# Patient Record
Sex: FEMALE | Race: WHITE | NOT HISPANIC OR LATINO | Employment: OTHER | URBAN - METROPOLITAN AREA
[De-identification: names, ages, dates, MRNs, and addresses within clinical notes are randomized per-mention and may not be internally consistent; named-entity substitution may affect disease eponyms.]

---

## 2018-03-05 ENCOUNTER — OFFICE VISIT CONVERTED (OUTPATIENT)
Dept: ORTHOPEDIC SURGERY | Facility: CLINIC | Age: 68
End: 2018-03-05
Attending: ORTHOPAEDIC SURGERY

## 2018-05-31 ENCOUNTER — OFFICE VISIT CONVERTED (OUTPATIENT)
Dept: PODIATRY | Facility: CLINIC | Age: 68
End: 2018-05-31
Attending: PODIATRIST

## 2018-06-21 ENCOUNTER — OFFICE VISIT CONVERTED (OUTPATIENT)
Dept: PODIATRY | Facility: CLINIC | Age: 68
End: 2018-06-21
Attending: PODIATRIST

## 2018-07-12 ENCOUNTER — CONVERSION ENCOUNTER (OUTPATIENT)
Dept: PODIATRY | Facility: CLINIC | Age: 68
End: 2018-07-12

## 2018-07-12 ENCOUNTER — OFFICE VISIT CONVERTED (OUTPATIENT)
Dept: PODIATRY | Facility: CLINIC | Age: 68
End: 2018-07-12
Attending: PODIATRIST

## 2018-08-23 ENCOUNTER — CONVERSION ENCOUNTER (OUTPATIENT)
Dept: GENERAL RADIOLOGY | Facility: HOSPITAL | Age: 68
End: 2018-08-23

## 2019-08-30 ENCOUNTER — HOSPITAL ENCOUNTER (OUTPATIENT)
Dept: GENERAL RADIOLOGY | Facility: HOSPITAL | Age: 69
Discharge: HOME | End: 2019-08-30

## 2020-03-20 ENCOUNTER — HOSPITAL ENCOUNTER (OUTPATIENT)
Dept: URGENT CARE | Facility: CLINIC | Age: 70
Discharge: HOME | End: 2020-03-20

## 2020-03-20 ENCOUNTER — OFFICE VISIT CONVERTED (OUTPATIENT)
Dept: ORTHOPEDIC SURGERY | Facility: CLINIC | Age: 70
End: 2020-03-20
Attending: ORTHOPAEDIC SURGERY

## 2020-03-23 ENCOUNTER — HOSPITAL ENCOUNTER (OUTPATIENT)
Dept: PERIOP | Facility: HOSPITAL | Age: 70
Setting detail: HOSPITAL OUTPATIENT SURGERY
Discharge: HOME | End: 2020-03-23
Attending: ORTHOPAEDIC SURGERY

## 2020-04-07 ENCOUNTER — OFFICE VISIT CONVERTED (OUTPATIENT)
Dept: ORTHOPEDIC SURGERY | Facility: CLINIC | Age: 70
End: 2020-04-07
Attending: ORTHOPAEDIC SURGERY

## 2020-05-07 ENCOUNTER — OFFICE VISIT CONVERTED (OUTPATIENT)
Dept: ORTHOPEDIC SURGERY | Facility: CLINIC | Age: 70
End: 2020-05-07
Attending: ORTHOPAEDIC SURGERY

## 2020-06-09 ENCOUNTER — CONVERSION ENCOUNTER (OUTPATIENT)
Dept: ORTHOPEDIC SURGERY | Facility: CLINIC | Age: 70
End: 2020-06-09

## 2020-06-09 ENCOUNTER — OFFICE VISIT CONVERTED (OUTPATIENT)
Dept: ORTHOPEDIC SURGERY | Facility: CLINIC | Age: 70
End: 2020-06-09
Attending: ORTHOPAEDIC SURGERY

## 2020-07-21 ENCOUNTER — OFFICE VISIT CONVERTED (OUTPATIENT)
Dept: ORTHOPEDIC SURGERY | Facility: CLINIC | Age: 70
End: 2020-07-21
Attending: ORTHOPAEDIC SURGERY

## 2020-08-15 ENCOUNTER — HOSPITAL ENCOUNTER (OUTPATIENT)
Dept: URGENT CARE | Facility: CLINIC | Age: 70
Discharge: HOME | End: 2020-08-15
Attending: EMERGENCY MEDICINE

## 2020-08-17 ENCOUNTER — OFFICE VISIT CONVERTED (OUTPATIENT)
Dept: FAMILY MEDICINE CLINIC | Facility: CLINIC | Age: 70
End: 2020-08-17
Attending: FAMILY MEDICINE

## 2020-08-28 ENCOUNTER — OFFICE VISIT CONVERTED (OUTPATIENT)
Dept: FAMILY MEDICINE CLINIC | Facility: CLINIC | Age: 70
End: 2020-08-28
Attending: FAMILY MEDICINE

## 2020-08-28 ENCOUNTER — HOSPITAL ENCOUNTER (OUTPATIENT)
Dept: GENERAL RADIOLOGY | Facility: HOSPITAL | Age: 70
Discharge: HOME | End: 2020-08-28
Attending: FAMILY MEDICINE

## 2020-09-11 ENCOUNTER — OFFICE VISIT CONVERTED (OUTPATIENT)
Dept: SURGERY | Facility: CLINIC | Age: 70
End: 2020-09-11
Attending: NURSE PRACTITIONER

## 2020-09-17 ENCOUNTER — OFFICE VISIT CONVERTED (OUTPATIENT)
Dept: FAMILY MEDICINE CLINIC | Facility: CLINIC | Age: 70
End: 2020-09-17
Attending: FAMILY MEDICINE

## 2020-10-01 ENCOUNTER — HOSPITAL ENCOUNTER (OUTPATIENT)
Dept: MRI IMAGING | Facility: HOSPITAL | Age: 70
Discharge: HOME | End: 2020-10-01
Attending: FAMILY MEDICINE

## 2020-10-02 ENCOUNTER — OFFICE VISIT CONVERTED (OUTPATIENT)
Dept: ORTHOPEDIC SURGERY | Facility: CLINIC | Age: 70
End: 2020-10-02
Attending: ORTHOPAEDIC SURGERY

## 2020-10-15 ENCOUNTER — HOSPITAL ENCOUNTER (OUTPATIENT)
Dept: GENERAL RADIOLOGY | Facility: HOSPITAL | Age: 70
Discharge: HOME | End: 2020-10-15
Attending: FAMILY MEDICINE

## 2020-10-15 ENCOUNTER — OFFICE VISIT CONVERTED (OUTPATIENT)
Dept: FAMILY MEDICINE CLINIC | Facility: CLINIC | Age: 70
End: 2020-10-15
Attending: FAMILY MEDICINE

## 2020-10-15 LAB
ANION GAP SERPL CALC-SCNC: 14 MMOL/L (ref 8–19)
BUN SERPL-MCNC: 15 MG/DL (ref 5–25)
BUN/CREAT SERPL: 19 {RATIO} (ref 6–20)
CALCIUM SERPL-MCNC: 9.2 MG/DL (ref 8.7–10.4)
CHLORIDE SERPL-SCNC: 103 MMOL/L (ref 99–111)
CONV CO2: 27 MMOL/L (ref 22–32)
CREAT UR-MCNC: 0.77 MG/DL (ref 0.5–0.9)
GFR SERPLBLD BASED ON 1.73 SQ M-ARVRAT: >60 ML/MIN/{1.73_M2}
GLUCOSE SERPL-MCNC: 83 MG/DL (ref 65–99)
OSMOLALITY SERPL CALC.SUM OF ELEC: 290 MOSM/KG (ref 273–304)
POTASSIUM SERPL-SCNC: 4.3 MMOL/L (ref 3.5–5.3)
SODIUM SERPL-SCNC: 140 MMOL/L (ref 135–147)

## 2020-11-05 ENCOUNTER — OFFICE VISIT CONVERTED (OUTPATIENT)
Dept: ORTHOPEDIC SURGERY | Facility: CLINIC | Age: 70
End: 2020-11-05
Attending: ORTHOPAEDIC SURGERY

## 2020-11-25 ENCOUNTER — HOSPITAL ENCOUNTER (OUTPATIENT)
Dept: PREADMISSION TESTING | Facility: HOSPITAL | Age: 70
Discharge: HOME | End: 2020-11-25
Attending: SURGERY

## 2020-11-27 LAB — SARS-COV-2 RNA SPEC QL NAA+PROBE: NOT DETECTED

## 2020-11-30 ENCOUNTER — HOSPITAL ENCOUNTER (OUTPATIENT)
Dept: GASTROENTEROLOGY | Facility: HOSPITAL | Age: 70
Setting detail: HOSPITAL OUTPATIENT SURGERY
Discharge: HOME | End: 2020-11-30
Attending: SURGERY

## 2020-12-17 ENCOUNTER — OFFICE VISIT CONVERTED (OUTPATIENT)
Dept: ORTHOPEDIC SURGERY | Facility: CLINIC | Age: 70
End: 2020-12-17
Attending: ORTHOPAEDIC SURGERY

## 2020-12-17 ENCOUNTER — CONVERSION ENCOUNTER (OUTPATIENT)
Dept: ORTHOPEDIC SURGERY | Facility: CLINIC | Age: 70
End: 2020-12-17

## 2021-01-05 ENCOUNTER — HOSPITAL ENCOUNTER (OUTPATIENT)
Dept: MRI IMAGING | Facility: HOSPITAL | Age: 71
Discharge: HOME | End: 2021-01-05
Attending: ORTHOPAEDIC SURGERY

## 2021-01-08 ENCOUNTER — OFFICE VISIT CONVERTED (OUTPATIENT)
Dept: ORTHOPEDIC SURGERY | Facility: CLINIC | Age: 71
End: 2021-01-08
Attending: ORTHOPAEDIC SURGERY

## 2021-01-15 ENCOUNTER — OFFICE VISIT CONVERTED (OUTPATIENT)
Dept: FAMILY MEDICINE CLINIC | Facility: CLINIC | Age: 71
End: 2021-01-15
Attending: FAMILY MEDICINE

## 2021-03-15 ENCOUNTER — HOSPITAL ENCOUNTER (OUTPATIENT)
Dept: VACCINE CLINIC | Facility: HOSPITAL | Age: 71
Discharge: HOME | End: 2021-03-15
Attending: INTERNAL MEDICINE

## 2021-04-05 ENCOUNTER — HOSPITAL ENCOUNTER (OUTPATIENT)
Dept: GENERAL RADIOLOGY | Facility: HOSPITAL | Age: 71
Discharge: HOME | End: 2021-04-05
Attending: FAMILY MEDICINE

## 2021-04-05 ENCOUNTER — HOSPITAL ENCOUNTER (OUTPATIENT)
Dept: VACCINE CLINIC | Facility: HOSPITAL | Age: 71
Discharge: HOME | End: 2021-04-05
Attending: INTERNAL MEDICINE

## 2021-04-29 ENCOUNTER — HOSPITAL ENCOUNTER (OUTPATIENT)
Dept: GENERAL RADIOLOGY | Facility: HOSPITAL | Age: 71
Discharge: HOME | End: 2021-04-29
Attending: FAMILY MEDICINE

## 2021-04-29 LAB
T4 FREE SERPL-MCNC: 1.7 NG/DL (ref 0.9–1.8)
TSH SERPL-ACNC: 0.86 M[IU]/L (ref 0.27–4.2)

## 2021-05-03 ENCOUNTER — OFFICE VISIT CONVERTED (OUTPATIENT)
Dept: FAMILY MEDICINE CLINIC | Facility: CLINIC | Age: 71
End: 2021-05-03
Attending: FAMILY MEDICINE
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
## 2021-05-10 NOTE — H&P
History and Physical      Patient Name: Marisa Uribe   Patient ID: [id number]   Sex: Female   YOB: 1950    Primary Care Provider: Garrett Sharp MD   Referring Provider: Kristian Byrne DPM    Visit Date: August 17, 2020    Provider: Amy Goel DO   Location: [facility]   Location Address: [address] [place] [address] [address]  [place], [place]  [id number]   Location Phone: [phone]          Chief Complaint  · Pt here to establish care and discuss acid reflux and bone density.      History Of Present Illness  Marisa Uribe is a 69 year old /White female who presents for evaluation and treatment of:      She is here today to establish relations as a new patient. She is here from [place] and moved her in 2016. Her presvious PCP was Jenni Paul.  She has a past medical history significant for hypertension, GERD and hypothyroidism.  She is a previous smoker and smoked for 16 years.  She smoked approximate 2 packs a day.  She occasionally uses alcohol.  She has a family history of coronary artery disease and lung cancer.    Colonoscopy in 2014 and had polyps.  She was due for repeat colonoscopy last year.  Mammogram last year and was normal.     Labs 3/3/2020: Cholesterol 199, triglycerides 72, HDL 75, .    DEXA scan: Femoral neck  - 1.7- (-) 1.9 AP spine -1.2.    Her home blood pressures are running 143/80 to 130/80.  She is in pain today.     She is being managed by Dr Martinez for her right leg fracture.     She has no other complaints today denies chest pain, shortness of breath, weakness, numbness, nausea, vomiting, diarrhea, dizziness or syncopal event.       Past Medical History  Disease Name Date Onset Notes   Allergies --  --    Arthritis --  --    Bunion --  --    Foot pain, left 07/12/2018 --    Hallux valgus (acquired), left foot 07/12/2018 --    Head injury --  --    Hemorrhoid --  --    Hypertension --  --    Metatarsal fracture 07/12/2018 --     Reflux --  --    Screening breast examination 2019 --    Thyroid disorder --  --          Past Surgical History  Procedure Name Date Notes   Bunion surgery --  --    Colonoscopy 2/25/14 --    Fibula Fracture --  --    Thyroid surgery --  --          Medication List  Name Date Started Instructions   losartan-hydrochlorothiazide 100-25 mg oral tablet  take 1 tablet by oral route once daily   Synthroid 125 mcg oral tablet  take 1 tablet (125 mcg) by oral route once daily         Allergy List  Allergen Name Date Reaction Notes   NO KNOWN DRUG ALLERGIES --  --  --        Allergies Reconciled  Family Medical History  Disease Name Relative/Age Notes   Heart Disease Father/   Father   Cancer, Unspecified Mother/   Mother   Diabetes, unspecified type Father/   Father         Social History  Finding Status Start/Stop Quantity Notes   Alcohol Use --  --/-- --  rarely drinks   lives with spouse --  --/-- --  --    . --  --/-- --  --    Recreational Drug Use Never --/-- --  no   Retired. --  --/-- --  --    Tobacco Former --/-- --  --          Review of Systems  · Constitutional  o Denies  o : fatigue, night sweats  · Eyes  o Denies  o : double vision, blurred vision  · HENT  o Denies  o : vertigo, recent head injury  · Breasts  o Denies  o : abnormal changes in breast size, additional breast symptoms except as noted in the HPI  · Cardiovascular  o Denies  o : chest pain, irregular heart beats  · Respiratory  o Denies  o : shortness of breath, productive cough  · Gastrointestinal  o Denies  o : nausea, vomiting  · Genitourinary  o Denies  o : dysuria, urinary retention  · Integument  o Denies  o : hair growth change, new skin lesions  · Neurologic  o Denies  o : altered mental status, seizures  · Musculoskeletal  o Admits  o : right forearm pain  · Endocrine  o Denies  o : cold intolerance, heat intolerance  · Heme-Lymph  o Denies  o : petechiae, lymph node enlargement or  "tenderness  · Allergic-Immunologic  o Denies  o : frequent illnesses      Vitals  Date Time BP Position Site L\R Cuff Size HR RR TEMP (F) WT  HT  BMI kg/m2 BSA m2 O2 Sat HC       08/17/2020 08:54 /83 Sitting    64 - R 14 96.6 168lbs 8oz 5'  3\" 29.85 1.84 97 %    08/17/2020 08:58 /79 Sitting    66 - R                 Physical Examination  · Constitutional  o Appearance  o : well-nourished, well developed, alert, NAD  · Head and Face  o Head  o :   § Inspection  § : atraumatic, normocephalic  · Eyes  o Eyes  o : extraocular movements intact, no scleral icterus, no conjunctival injection  · Ears, Nose, Mouth and Throat  o Ears  o :   § External Ears  § : normal  o Nose  o :   § Intranasal Exam  § : nares patent  o Oral Cavity  o :   § Oral Mucosa  § : moist mucous membranes  · Respiratory  o Respiratory Effort  o : breathing comfortably, symmetric chest rise  o Auscultation of Lungs  o : clear to asculatation bilaterally, no wheezes, rales, or rhonchii  · Cardiovascular  o Heart  o :   § Auscultation of Heart  § : regular rate and rhythm, no murmurs, rubs, or gallops  o Peripheral Vascular System  o :   § Extremities  § : no edema  · Skin and Subcutaneous Tissue  o General Inspection  o : no rashes present, no lesions present, no areas of discoloration, skin turgor normal  · Neurologic  o Mental Status Examination  o :   § Orientation  § : grossly oriented to person, place and time  o Cranial Nerves  o : crainial nerves 2-12 grossly intact  o Gait and Station  o :   § Gait Screening  § : normal gait  · Psychiatric  o General  o : normal mood and affect              Assessment  · Essential hypertension     401.9/I10  · Hypothyroidism     244.9/E03.9  · Vitamin D deficiency     268.9/E55.9  · Screening for depression     V79.0/Z13.89  · Screening for colon cancer     V76.51/Z12.11    Problems Reconciled  Plan  · Orders  o ACO-18: Negative screen for clinical depression using a standardized tool () - " V79.0/Z13.89 - 08/17/2020  o COLONOSCOPY REFERRAL (COLON) - V76.51/Z12.11 - 08/17/2020  o CBC with Auto Diff [facility] (15727) - 401.9/I10 - 08/17/2020  o CMP H (76744) - 401.9/I10 - 08/17/2020  o Thyroid Profile (17771, 33790, THYII) - 244.9/E03.9 - 08/17/2020  o Vitamin D (25-Hydroxy) Level (97570) - 268.9/E55.9 - 08/17/2020  o ACO-39: Current medications updated and reviewed () - - 08/17/2020  · Medications  o Medications have been Reconciled  o Transition of Care or Provider Policy  · Instructions  o Depression Screen completed and scanned into the EMR under the designated folder within the patient's documents.  o Today's PHQ-9 result is _0__  o Patient was educated/instructed on their diagnosis, treatment and medications prior to discharge from the clinic today.  · Disposition  o Follow Up in 3 months     1.  Hypertension: Her blood pressure is elevated today's visit but she is in pain.  She was told to continue taking her blood pressure at home and bring a log and her cuff back for next appointment.  2.  Hypothyroidism: She will be continued on her current dosage of Synthroid.  The patient was given an order today for a thyroid profile to be managed according to findings.  3.  Vitamin D deficiency: She was encouraged to continue her current over-the-counter vitamin D replacement.  A lab order was placed for a vitamin D level today to be managed according to findings.  4.  Screening for colon cancer: She was given order today for screening colonoscopy.             Electronically Signed by: Amy Goel DO -Author on August 17, 2020 01:43:54 PM

## 2021-05-10 NOTE — H&P
History and Physical      Patient Name: Marisa Uribe   Patient ID: [id number]   Sex: Female   YOB: 1950    Primary Care Provider: Amy Goel DO   Referring Provider: Amy Goel DO    Visit Date: September 11, 2020    Provider: NITO Key   Location: [facility]   Location Address: [address] [place] [address]  [id number]   Location Phone: [phone]          Chief Complaint  · Requesting colonoscopy  · Age 50 or over  · Here today for a pre-surgical colon screening visit  · Personal History of Polyps      History Of Present Illness  The patient is a 69 year old /White female presenting to the Surgical Specialist office on a referral from Amy Goel DO.   Marisa Uribe needs to have a screening colonoscopy.   Patient states that they have had a colonoscopy. 6 years ago   Patient currently complains of: no complaints and   Patient Does not have family history of colon cancer.      Patient presents today on referral from Dr. Amy Goel for screening colonoscopy.  Patient denies any abdominal pain, diarrhea, or rectal bleeding.  Patient denies any family history of colorectal cancer.  Admits to history of colonic polyps.    2/14: Colonoscopy ([facility]): Diverticulosis; External hemorrhoids.     11/10: Colonoscopy ([facility]): 2-Adenomas (location not identified)       Past Medical History  Disease Name Date Onset Notes   Allergies --  --    Arthritis --  --    Bunion --  --    Foot pain, left 07/12/2018 --    GERD --  --    Hallux valgus (acquired), left foot 07/12/2018 --    Head injury --  --    Hemorrhoid --  --    Hypertension --  --    Hypothyroidism --  --    Metatarsal fracture 07/12/2018 --    Rectal bleeding --  --    Reflux --  --    Screening breast examination 2019 --    Thyroid disorder --  --          Past Surgical History  Procedure Name Date Notes   Bunion surgery --  --    Colonoscopy 2/25/14 --    Fibula Fracture --  --    Thyroid surgery  "--  --          Medication List  Name Date Started Instructions   losartan-hydrochlorothiazide 100-25 mg oral tablet  take 1 tablet by oral route once daily   Synthroid 125 mcg oral tablet  take 1 tablet (125 mcg) by oral route once daily         Allergy List  Allergen Name Date Reaction Notes   NO KNOWN DRUG ALLERGIES --  --  --        Allergies Reconciled  Family Medical History  Disease Name Relative/Age Notes   Heart Disease Father/   Father   Cancer, Unspecified Mother/   Mother   Diabetes, unspecified type Father/   Father         Social History  Finding Status Start/Stop Quantity Notes   Alcohol Use --  --/-- --  09/11/2020 - rarely drinks   lives with spouse --  --/-- --  --    . --  --/-- --  --    Recreational Drug Use Never --/-- --  no   Retired. --  --/-- --  --    Tobacco Former --/-- --  --          Review of Systems  · Constitutional  o Denies  o : fever, chills  · Eyes  o Denies  o : yellowish discoloration of eyes  · HENT  o Denies  o : difficulty swallowing  · Cardiovascular  o Denies  o : chest pain, chest pain on exertion  · Respiratory  o Denies  o : shortness of breath  · Gastrointestinal  o Denies  o : nausea, vomiting, diarrhea, constipation  · Genitourinary  o Denies  o : abnormal color of urine  · Integument  o Denies  o : rash  · Neurologic  o Denies  o : tingling or numbness  · Musculoskeletal  o Denies  o : joint pain  · Endocrine  o Denies  o : weight gain, weight loss      Vitals  Date Time BP Position Site L\R Cuff Size HR RR TEMP (F) WT  HT  BMI kg/m2 BSA m2 O2 Sat        09/11/2020 09:55 AM         162lbs 0oz 5'  3\" 28.7 1.81           Physical Examination  · Constitutional  o Appearance  o : well developed, well-nourished, patient in no apparent distress  · Head and Face  o Head  o :   § Inspection  § : atraumatic, normocephalic  o Face  o :   § Inspection  § : no facial lesions  · Eyes  o Conjunctivae  o : conjunctivae normal  o Sclerae  o : sclerae " white  · Neck  o Inspection/Palpation  o : normal appearance, no masses or tenderness, trachea midline  · Respiratory  o Respiratory Effort  o : breathing unlabored  · Skin and Subcutaneous Tissue  o General Inspection  o : no lesions present, no areas of discoloration, skin turgor normal, texture normal  · Neurologic  o Mental Status Examination  o :   § Orientation  § : grossly oriented to person, place and time  § Attention  § : attention normal, concentration abilities normal  § Fund of Knowledge  § : fund of knowledge within normal limits, patient aware of current events  o Gait and Station  o : normal gait, able to stand without difficulty  · Psychiatric  o Judgement and Insight  o : judgment and insight intact  o Mood and Affect  o : mood normal, affect appropriate          Assessment  · Personal history of colonic polyps     V12.72/Z86.010  · Screening for colon cancer     V76.51/Z12.11  · Pre-op testing     V72.84/Z01.818    Problems Reconciled  Plan  · Orders  o Consent for Colonoscopy Screening-Possible risk/complications, benefits, and alternatives to surgical or invasive procedure have been explained to patient and/or legal guardian. -Patient has been evaluated and can tolerate anesthesia and/or sedation. Risks, benefits, and alternatives to anesthesia and sedation have been explained to patient and/or legal guardian. () - V76.51/Z12.11, V12.72/Z86.010, V72.84/Z01.818 - 11/23/2020  o [facility] [place] Covid-19 Screening (22730) - V72.84/Z01.818 - 11/18/2020   [address] @ 4:15pm  · Medications  o Medications have been Reconciled  o Transition of Care or Provider Policy  · Instructions  o Surgical Facility: [facility]  o Handouts Provided Pre-Procedure Instructions including date, time, and location of procedure.   o PLAN: Proceeed with colonoscopy. Patient understands risks/benefits and is willing to proceed.   o ***Surgical Orders***  o RISK AND BENEFITS:  o Given these options,  the patient has verbally expressed an understanding of the risks of the surgery and finds these risks acceptable. Will proceed with surgery as soon as possible.  o O.R. PREP: Per protocol   o IV: Per Anesthesia  o Please sign permit for: Colonoscopy with possible biopsies by Dr. Celeste.  o The above History and Physical Examination has been completed within 30 days of admission.  o ***Patient Status***  o Outpatient  o Follow up in the in the office post procedure.  o Advised patient she would need COVID-19 testing prior to procedure. Encourage patient self isolate in between testing and procedure. Patient verbalizes understanding is willing to proceed.  o Electronically Identified Patient Education Materials Provided Electronically  · Disposition  o Call or Return if symptoms worsen or persist.            Electronically Signed by: NITO Key -Author on September 11, 2020 10:17:28 AM

## 2021-05-12 NOTE — PROGRESS NOTES
"   Progress Note      Patient Name: Marisa Uribe   Patient ID: [id number]   Sex: Female   YOB: 1950    Primary Care Provider: Garrett Sharp MD   Referring Provider: Kristian Byrne DPM    Visit Date: April 7, 2020    Provider: Bg Douglass MD   Location: [facility]   Location Address: [address] [place] [address]  [id number]   Location Phone: [phone]          Chief Complaint  · Right ankle pain  · Right knee pain      History Of Present Illness  Marisa Uribe is a 69 year old /White female who presents today to [facility]. Patient presents for two week post operative evaluation of ORIF Right Distal Fibula fracture, 3/23/20. Patient states her ankle is doing well, patient presents in splint and had this removed for xrays. Patient using a knee scooter. Patient states she injured her knee as well as the ankle on the same day on 3/20/20, she states the ankle was worse and therefore focused on the ankle, she states the right knee has been bothering her since the injury, she admits to \"clicking\" of the right knee with swelling and pain, she states the pain has decreased as well as the swelling, she states the knee pain is worse at night. Patient has taken all of her pain medication due to knee and ankle pain, is requesting a refill of her pain medication.       Past Medical History  Bunion; Foot pain, left; Hallux valgus (acquired), left foot; Hypertension; Metatarsal fracture; Reflux; Thyroid disorder         Past Surgical History  Bunion surgery; Colonoscopy; Thyroid surgery         Medication List  Synthroid 88 mcg oral tablet; valsartan-hydrochlorothiazide 160-25 mg oral tablet         Allergy List  NO KNOWN DRUG ALLERGIES       Allergies Reconciled  Family Medical History  Heart Disease; Cancer, Unspecified; Diabetes, unspecified type         Social History  Alcohol Use (Current some day); lives with spouse; .; Recreational Drug Use (Never); Retired.; " "Tobacco (Former)         Review of Systems  · Constitutional  o Denies  o : fever, chills, weight loss  · Cardiovascular  o Denies  o : chest pain, shortness of breath  · Gastrointestinal  o Denies  o : liver disease, heartburn, nausea, blood in stools  · Genitourinary  o Denies  o : painful urination, blood in urine  · Integument  o Denies  o : rash, itching  · Neurologic  o Denies  o : headache, weakness, loss of consciousness  · Musculoskeletal  o Denies  o : painful, swollen joints  · Psychiatric  o Denies  o : drug/alcohol addiction, anxiety, depression      Vitals  Date Time BP Position Site L\R Cuff Size HR RR TEMP (F) WT  HT  BMI kg/m2 BSA m2 O2 Sat HC       04/07/2020 01:30 PM       16  160lbs 0oz 5'  3\" 28.34 1.8           Physical Examination  · Constitutional  o Appearance  o : well developed, well-nourished, no obvious deformities present  · Head and Face  o Head  o :   § Inspection  § : normocephalic  o Face  o :   § Inspection  § : no facial lesions  · Eyes  o Conjunctivae  o : conjunctivae normal  o Sclerae  o : sclerae white  · Ears, Nose, Mouth and Throat  o Ears  o :   § External Ears  § : appearance within normal limits  § Hearing  § : intact  o Nose  o :   § External Nose  § : appearance normal  · Neck  o Inspection/Palpation  o : normal appearance  o Range of Motion  o : full range of motion  · Respiratory  o Respiratory Effort  o : breathing unlabored  o Inspection of Chest  o : normal appearance  o Auscultation of Lungs  o : no audible wheezing or rales  · Cardiovascular  o Heart  o : regular rate  · Gastrointestinal  o Abdominal Examination  o : soft and non-tender  · Skin and Subcutaneous Tissue  o General Inspection  o : intact, no rashes  · Psychiatric  o General  o : Alert and oriented x3  o Judgement and Insight  o : judgment and insight intact  o Mood and Affect  o : mood normal, affect appropriate  · Right Lower Extremity  o Inspection  o : Resolving ecchymosis of lateral knee and " tender to palpation of lateral knee and IT band. Nontender posterior, medial or anterior knee. Full extension, flexion 120, stable AtoP, stable varus/valgus. Nontender calf, negative Homans, 2+ capillary refill. 2+DP/PT pulses. Incision is healing well of the ankle, no redness, no swelling, no ecchymosis. ROM of ankle limited secondary to pain/stiffness.   · In Office Procedures  o View  o : AP/LATERAL,LATERA/SUNRISE/STANDING  o Site  o : right ankle, right knee  o Indication  o : right ankle pain, right knee pain  o Study  o : X-rays ordered, taken in the office, and reviewed today.  o Xray  o : Right Knee: No fracture, no dislocation, mild degenerative changes. Right Ankle: well aligned, hardware intact, healing fracture, no screw loosening or hardware failure.   o Comparative Data  o : No comparative data found          Assessment  · Aftercare following surgery of ORIF Right Distal Fibula fracture, 3/23/20     V54.81  · Right ankle pain, unspecified chronicity     719.47/M25.571  · Right knee pain, unspecified chronicity     719.46/M25.561      Plan  · Orders  o Ankle (Right) [facility] Preferred View ([id number]) - 719.47/M25.571 - 04/07/2020  o Knee (Right) [facility] Preferred View ([id number]) - 719.46/M25.561 - 04/07/2020  · Medications  o Medications have been Reconciled  o Transition of Care or Provider Policy  · Instructions  o Reviewed the patient's Past Medical, Social, and Family history as well as the ROS at today's visit, no changes.  o Call or return if worsening symptoms.  o Follow Up in 4 weeks.   o The above service was scribed by Chet Fisher PA-C on my behalf and I attest to the accuracy of the note. jsb  o Reviewed xrays with patient, advised her that we will place her in walking boot, 25% weight bearing, work on ROM, follow up in 4 weeks, xray at next visit. Continue rest, ice, elevate the knee and ankle. Pain medication as needed and refill given. Follow up in 4 weeks             Electronically Signed by:  ZENOBIA Valdez-SUZANNE -Author on April 7, 2020 02:45:48 PM  Electronically Co-signed by: Bg Douglass MD -Reviewer on April 7, 2020 09:27:26 PM

## 2021-05-13 NOTE — PROGRESS NOTES
Progress Note      Patient Name: Marisa Uribe   Patient ID: [id number]   Sex: Female   YOB: 1950    Primary Care Provider: Garrett Sharp MD   Referring Provider: Kristian Byrne DPM    Visit Date: July 21, 2020    Provider: Bg Douglass MD   Location: [facility]   Location Address: [address] [place] [address]  [id number]   Location Phone: [phone]          Chief Complaint  · Right ankle pain      History Of Present Illness  Marisa Uribe is a 69 year old /White female who presents today to [facility]. Patient presents for follow-up evaluation of ORIF right distal fibula fracture, 3/23/2020. Patient states that she is doing well, she states the ankle still swells, but she states it is a lot better, she denies pain, has resumed normal activities. No new complaints today.       Past Medical History  Bunion; Foot pain, left; Hallux valgus (acquired), left foot; Hypertension; Metatarsal fracture; Reflux; Thyroid disorder         Past Surgical History  Bunion surgery; Colonoscopy; Thyroid surgery         Medication List  Synthroid 88 mcg oral tablet; valsartan-hydrochlorothiazide 160-25 mg oral tablet         Allergy List  NO KNOWN DRUG ALLERGIES       Allergies Reconciled  Family Medical History  Heart Disease; Cancer, Unspecified; Diabetes, unspecified type         Social History  Alcohol Use (Current some day); lives with spouse; .; Recreational Drug Use (Never); Retired.; Tobacco (Former)         Review of Systems  · Constitutional  o Denies  o : fever, chills, weight loss  · Cardiovascular  o Denies  o : chest pain, shortness of breath  · Gastrointestinal  o Denies  o : liver disease, heartburn, nausea, blood in stools  · Genitourinary  o Denies  o : painful urination, blood in urine  · Integument  o Denies  o : rash, itching  · Neurologic  o Denies  o : headache, weakness, loss of consciousness  · Musculoskeletal  o Denies  o : painful, swollen  "joints  · Psychiatric  o Denies  o : drug/alcohol addiction, anxiety, depression      Vitals  Date Time BP Position Site L\R Cuff Size HR RR TEMP (F) WT  HT  BMI kg/m2 BSA m2 O2 Sat HC       07/21/2020 01:32 PM      74 - R 20  162lbs 0oz 5'  3\" 28.7 1.81 97 %          Physical Examination  · Constitutional  o Appearance  o : well developed, well-nourished, no obvious deformities present  · Head and Face  o Head  o :   § Inspection  § : normocephalic  o Face  o :   § Inspection  § : no facial lesions  · Eyes  o Conjunctivae  o : conjunctivae normal  o Sclerae  o : sclerae white  · Ears, Nose, Mouth and Throat  o Ears  o :   § External Ears  § : appearance within normal limits  § Hearing  § : intact  o Nose  o :   § External Nose  § : appearance normal  · Neck  o Inspection/Palpation  o : normal appearance  o Range of Motion  o : full range of motion  · Respiratory  o Respiratory Effort  o : breathing unlabored  o Inspection of Chest  o : normal appearance  o Auscultation of Lungs  o : no audible wheezing or rales  · Cardiovascular  o Heart  o : regular rate  · Gastrointestinal  o Abdominal Examination  o : soft and non-tender  · Skin and Subcutaneous Tissue  o General Inspection  o : intact, no rashes  · Psychiatric  o General  o : Alert and oriented x3  o Judgement and Insight  o : judgment and insight intact  o Mood and Affect  o : mood normal, affect appropriate  · Right Ankle/Foot  o Inspection  o : Patient is ambulating with no boot or knee scooter with regular walking shoes. Well-healed incisions, no redness, no swelling, no signs of infection. Dorsiflexion 10, plantarflexion 30, nontender ankle, neurovascular intact, sensation grossly intact. Positive pulses.  · In Office Procedures  o View  o : AP/LATERAL  o Site  o : right ankle  o Indication  o : right ankle pain  o Study  o : X-rays ordered, taken in the office, and reviewed today.  o Comparative Data  o : No comparative data " found  · Imaging  o Imaging  o : Progressive healing of distal fibula fracture, stable hardware without signs of loosening, good alignment.           Assessment  · Aftercare following surgery of ORIF Right Distal Fibula fracture, 3/23/20     V54.81  · Right ankle pain, unspecified chronicity     719.47/M25.571      Plan  · Orders  o Ankle (Right) [facility] Preferred View ([id number]) - 719.47/M25.571 - 07/21/2020  · Instructions  o Reviewed the patient's Past Medical, Social, and Family history as well as the ROS at today's visit, no changes.  o Call or return if worsening symptoms.  o Follow up as needed.  o The above service was scribed by Chet Fisher PA-C on my behalf and I attest to the accuracy of the note. jsb  o Reviewed xrays with the patient, advised her to continue activity as tolerated. Follow up as needed, patient agrees.             Electronically Signed by: Rui Fisher PA-C -Author on July 21, 2020 03:02:52 PM  Electronically Co-signed by: Bg Douglass MD -Reviewer on July 21, 2020 09:47:28 PM

## 2021-05-13 NOTE — PROGRESS NOTES
Progress Note      Patient Name: Marisa Uribe   Patient ID: [id number]   Sex: Female   YOB: 1950    Primary Care Provider: Garrett Sharp MD   Referring Provider: Kristian Byrne DPM    Visit Date: June 9, 2020    Provider: Bg Douglass MD   Location: [facility]   Location Address: [address] [place] [address]  [id number]   Location Phone: [phone]          Chief Complaint  · Follow up right ankle surgery      History Of Present Illness  Marisa Uribe is a 69 year old /White female who presents today to [facility]. Patient presents for follow-up evaluation of ORIF right distal fibula fracture, 3/23/2020. Patient states she is doing well, she has resumed normal of daily living including cutting grass, cleaning her house and has not been using the walking boot for several weeks. Patient states she went to for physical therapy appointments, states she did not see any benefit and has been doing home exercises with good results. Patient states she has continued occasional swelling with increased activity and has some pain in the dorsum of her foot but denies ankle pain. Patient denies need for pain medication. No new complaints today       Past Medical History  Bunion; Foot pain, left; Hallux valgus (acquired), left foot; Hypertension; Metatarsal fracture; Reflux; Thyroid disorder         Past Surgical History  Bunion surgery; Colonoscopy; Thyroid surgery         Medication List  Norco 7.5-325 mg oral tablet; Synthroid 88 mcg oral tablet; valsartan-hydrochlorothiazide 160-25 mg oral tablet         Allergy List  NO KNOWN DRUG ALLERGIES       Allergies Reconciled  Family Medical History  Heart Disease; Cancer, Unspecified; Diabetes, unspecified type         Social History  Alcohol Use (Current some day); lives with spouse; .; Recreational Drug Use (Never); Retired.; Tobacco (Former)         Review of Systems  · Constitutional  o Denies  o : fever, chills, weight  "loss  · Cardiovascular  o Denies  o : chest pain, shortness of breath  · Gastrointestinal  o Denies  o : liver disease, heartburn, nausea, blood in stools  · Genitourinary  o Denies  o : painful urination, blood in urine  · Integument  o Denies  o : rash, itching  · Neurologic  o Denies  o : headache, weakness, loss of consciousness  · Musculoskeletal  o Denies  o : painful, swollen joints  · Psychiatric  o Denies  o : drug/alcohol addiction, anxiety, depression      Vitals  Date Time BP Position Site L\R Cuff Size HR RR TEMP (F) WT  HT  BMI kg/m2 BSA m2 O2 Sat HC       06/09/2020 02:02 PM      75 - R   160lbs 0oz 5'  3\" 28.34 1.8 97 %          Physical Examination  · Constitutional  o Appearance  o : well developed, well-nourished, no obvious deformities present  · Head and Face  o Head  o :   § Inspection  § : normocephalic  o Face  o :   § Inspection  § : no facial lesions  · Eyes  o Conjunctivae  o : conjunctivae normal  o Sclerae  o : sclerae white  · Ears, Nose, Mouth and Throat  o Ears  o :   § External Ears  § : appearance within normal limits  § Hearing  § : intact  o Nose  o :   § External Nose  § : appearance normal  · Neck  o Inspection/Palpation  o : normal appearance  o Range of Motion  o : full range of motion  · Respiratory  o Respiratory Effort  o : breathing unlabored  o Inspection of Chest  o : normal appearance  o Auscultation of Lungs  o : no audible wheezing or rales  · Cardiovascular  o Heart  o : regular rate  · Gastrointestinal  o Abdominal Examination  o : soft and non-tender  · Skin and Subcutaneous Tissue  o General Inspection  o : intact, no rashes  · Psychiatric  o General  o : Alert and oriented x3  o Judgement and Insight  o : judgment and insight intact  o Mood and Affect  o : mood normal, affect appropriate  · Right Ankle/Foot  o Inspection  o : Patient is ambulating with no boot or knee scooter with regular walking shoes. Well-healed incisions, no redness, no swelling, no signs of " infection. Dorsiflexion 10, plantarflexion 30, nontender ankle, neurovascular intact, sensation grossly intact. Positive pulses.   · In Office Procedures  o View  o : AP/LATERAL  o Site  o : right, ankle   o Indication  o : Right ankle pain   o Study  o : X-rays ordered, taken in the office, and reviewed today.  o Xray  o : Progressive healing of distal fibula fracture, stable hardware without signs of loosening, good alignment.          Assessment  · Aftercare following surgery of ORIF right distal fibula fracture, 3/23/2020     V54.81  · Right ankle pain     719.47/M25.571      Plan  · Orders  o Ankle (Right) [facility] Preferred View ([id number]) - 719.47/M25.571 - 06/09/2020  · Medications  o Medications have been Reconciled  o Transition of Care or Provider Policy  · Instructions  o Reviewed the patient's Past Medical, Social, and Family history as well as the ROS at today's visit, no changes.  o Call or return if worsening symptoms.  o Follow up in 6 weeks.  o The above service was scribed by Chet Fisher PA-C on my behalf and I attest to the accuracy of the note. jsb  o Reviewed x-rays with the patient, advised her of progressive healing. Advised patient to continue activity and weightbearing as tolerated. Follow-up in 6 weeks with x-rays.            Electronically Signed by: Rui Fisher PA-C -Author on June 9, 2020 04:53:05 PM  Electronically Co-signed by: Bg Douglass MD -Reviewer on June 9, 2020 09:34:29 PM
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
## 2021-05-13 NOTE — PROGRESS NOTES
Progress Note      Patient Name: Marisa Uribe   Patient ID: [id number]   Sex: Female   YOB: 1950    Primary Care Provider: Garrett Sharp MD   Referring Provider: Kristian Byrne DPM    Visit Date: May 7, 2020    Provider: Bg Douglass MD   Location: [facility]   Location Address: [address] [place] [address]  [id number]   Location Phone: [phone]          Chief Complaint  · Right ankle pain      History Of Present Illness  Marisa Uribe is a 69 year old /White female who presents today to [facility].      Patient is post-op ORIF right distal fibula fracture performed on 3/23/2020. Patient comes in with an intact walking boot and using a knee scooter for ambulation assistance. Denies going to physical therapy. Patient states mild right ankle soreness.       Past Medical History  Bunion; Foot pain, left; Hallux valgus (acquired), left foot; Hypertension; Metatarsal fracture; Reflux; Thyroid disorder         Past Surgical History  Bunion surgery; Colonoscopy; Thyroid surgery         Medication List  Norco 7.5-325 mg oral tablet; Synthroid 88 mcg oral tablet; valsartan-hydrochlorothiazide 160-25 mg oral tablet         Allergy List  NO KNOWN DRUG ALLERGIES         Family Medical History  Heart Disease; Cancer, Unspecified; Diabetes, unspecified type         Social History  Alcohol Use (Current some day); lives with spouse; .; Recreational Drug Use (Never); Retired.; Tobacco (Former)         Review of Systems  · Constitutional  o Denies  o : fever, chills, weight loss  · Cardiovascular  o Denies  o : chest pain, shortness of breath  · Gastrointestinal  o Denies  o : liver disease, heartburn, nausea, blood in stools  · Genitourinary  o Denies  o : painful urination, blood in urine  · Integument  o Denies  o : rash, itching  · Neurologic  o Denies  o : headache, weakness, loss of consciousness  · Musculoskeletal  o Denies  o : painful, swollen  "joints  · Psychiatric  o Denies  o : drug/alcohol addiction, anxiety, depression      Vitals  Date Time BP Position Site L\R Cuff Size HR RR TEMP (F) WT  HT  BMI kg/m2 BSA m2 O2 Sat        05/07/2020 01:23 PM         160lbs 0oz 5'  3\" 28.34 1.8           Physical Examination  · Constitutional  o Appearance  o : well developed, well-nourished, no obvious deformities present  · Head and Face  o Head  o :   § Inspection  § : normocephalic  o Face  o :   § Inspection  § : no facial lesions  · Eyes  o Conjunctivae  o : conjunctivae normal  o Sclerae  o : sclerae white  · Ears, Nose, Mouth and Throat  o Ears  o :   § External Ears  § : appearance within normal limits  § Hearing  § : intact  o Nose  o :   § External Nose  § : appearance normal  · Neck  o Inspection/Palpation  o : normal appearance  o Range of Motion  o : full range of motion  · Respiratory  o Respiratory Effort  o : breathing unlabored  o Inspection of Chest  o : normal appearance  o Auscultation of Lungs  o : no audible wheezing or rales  · Cardiovascular  o Heart  o : regular rate  · Gastrointestinal  o Abdominal Examination  o : soft and non-tender  · Skin and Subcutaneous Tissue  o General Inspection  o : intact, no rashes  · Psychiatric  o General  o : Alert and oriented x3  o Judgement and Insight  o : judgment and insight intact  o Mood and Affect  o : mood normal, affect appropriate  · Right Ankle/Foot  o Inspection  o : Ambulating with a knee scooter. Well-healed scars. No signs of infection. Mild swelling. Decreased ankle range of motion. Dorsiflexion 5. Plantar flexion 25. Non-tender ankle. Neurovascularly intact. Sensation grossly intact. Positive pulses.   · In Office Procedures  o View  o : AP/LATERAL  o Site  o : right ankle  o Indication  o : right ankle pain  o Study  o : X-rays ordered, taken in the office, and reviewed today.  o Xray  o : Visible distal fibula fracture line; Stable hardware without signs of loosening.   o Comparative " Data  o : No comparative data found          Assessment  · Aftercare ORIF Right Distal Fibula Fracture     V54.81  · Right ankle pain, unspecified chronicity     719.47/M25.571      Plan  · Orders  o Ankle (Right) [facility] Preferred View ([id number]) - 719.47/M25.571 - 05/07/2020  · Medications  o Medications have been Reconciled  o Transition of Care or Provider Policy  · Instructions  o Reviewed the patient's Past Medical, Social, and Family history as well as the ROS at today's visit, no changes.  o Call or return if worsening symptoms.  o The above service was scribed by Millie Hewitt on my behalf and I attest to the accuracy of the note. jsb  o Patient may begin weight bearing with walking boot intact. Patient needs to wear the walking boot for the next two weeks. Prescribed a course of physical therapy. Follow-up in 4 weeks for right ankle x-rays.             Electronically Signed by: Myra Hewitt - , Other -Author on May 7, 2020 02:53:49 PM  Electronically Co-signed by: Bg Douglass MD -Reviewer on May 7, 2020 09:09:00 PM
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
## 2021-05-14 VITALS — BODY MASS INDEX: 28.04 KG/M2 | WEIGHT: 158.25 LBS | HEART RATE: 75 BPM | OXYGEN SATURATION: 98 % | HEIGHT: 63 IN

## 2021-05-14 VITALS
BODY MASS INDEX: 29.08 KG/M2 | SYSTOLIC BLOOD PRESSURE: 174 MMHG | RESPIRATION RATE: 18 BRPM | TEMPERATURE: 99.1 F | OXYGEN SATURATION: 97 % | HEART RATE: 63 BPM | WEIGHT: 164.12 LBS | HEIGHT: 63 IN | DIASTOLIC BLOOD PRESSURE: 83 MMHG

## 2021-05-14 VITALS — WEIGHT: 162 LBS | BODY MASS INDEX: 28.7 KG/M2 | HEIGHT: 63 IN

## 2021-05-14 VITALS — OXYGEN SATURATION: 98 % | HEART RATE: 80 BPM | BODY MASS INDEX: 28.44 KG/M2 | HEIGHT: 63 IN | WEIGHT: 160.5 LBS

## 2021-05-14 VITALS
BODY MASS INDEX: 29.26 KG/M2 | WEIGHT: 165.12 LBS | OXYGEN SATURATION: 97 % | TEMPERATURE: 96.9 F | HEART RATE: 57 BPM | SYSTOLIC BLOOD PRESSURE: 157 MMHG | RESPIRATION RATE: 20 BRPM | DIASTOLIC BLOOD PRESSURE: 79 MMHG | HEIGHT: 63 IN

## 2021-05-14 VITALS
TEMPERATURE: 98 F | DIASTOLIC BLOOD PRESSURE: 82 MMHG | OXYGEN SATURATION: 97 % | HEIGHT: 63 IN | SYSTOLIC BLOOD PRESSURE: 149 MMHG | BODY MASS INDEX: 28.53 KG/M2 | HEART RATE: 64 BPM | WEIGHT: 161 LBS

## 2021-05-14 VITALS
HEART RATE: 62 BPM | BODY MASS INDEX: 29.06 KG/M2 | OXYGEN SATURATION: 97 % | TEMPERATURE: 97.5 F | RESPIRATION RATE: 18 BRPM | HEIGHT: 63 IN | DIASTOLIC BLOOD PRESSURE: 79 MMHG | WEIGHT: 164 LBS | SYSTOLIC BLOOD PRESSURE: 157 MMHG

## 2021-05-14 VITALS
WEIGHT: 162.25 LBS | OXYGEN SATURATION: 97 % | SYSTOLIC BLOOD PRESSURE: 154 MMHG | BODY MASS INDEX: 28.75 KG/M2 | TEMPERATURE: 98.5 F | HEART RATE: 61 BPM | DIASTOLIC BLOOD PRESSURE: 78 MMHG | HEIGHT: 63 IN | RESPIRATION RATE: 18 BRPM

## 2021-05-14 VITALS — WEIGHT: 162.31 LBS | BODY MASS INDEX: 28.76 KG/M2 | HEIGHT: 63 IN
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
## 2021-05-15 VITALS — BODY MASS INDEX: 28.35 KG/M2 | HEIGHT: 63 IN | WEIGHT: 160 LBS

## 2021-05-15 VITALS
HEART RATE: 74 BPM | OXYGEN SATURATION: 97 % | WEIGHT: 162 LBS | HEIGHT: 63 IN | BODY MASS INDEX: 28.7 KG/M2 | RESPIRATION RATE: 20 BRPM

## 2021-05-15 VITALS — HEART RATE: 83 BPM | OXYGEN SATURATION: 99 % | HEIGHT: 63 IN

## 2021-05-15 VITALS — RESPIRATION RATE: 16 BRPM | BODY MASS INDEX: 28.35 KG/M2 | WEIGHT: 160 LBS | HEIGHT: 63 IN

## 2021-05-15 VITALS
DIASTOLIC BLOOD PRESSURE: 83 MMHG | BODY MASS INDEX: 29.86 KG/M2 | HEART RATE: 64 BPM | HEIGHT: 63 IN | OXYGEN SATURATION: 97 % | WEIGHT: 168.5 LBS | SYSTOLIC BLOOD PRESSURE: 181 MMHG | RESPIRATION RATE: 14 BRPM | TEMPERATURE: 96.6 F

## 2021-05-15 VITALS — WEIGHT: 160 LBS | OXYGEN SATURATION: 97 % | HEIGHT: 63 IN | BODY MASS INDEX: 28.35 KG/M2 | HEART RATE: 75 BPM

## 2021-05-16 VITALS — BODY MASS INDEX: 27.23 KG/M2 | OXYGEN SATURATION: 98 % | HEIGHT: 64 IN | WEIGHT: 159.5 LBS | HEART RATE: 76 BPM

## 2021-05-16 VITALS — WEIGHT: 160 LBS | HEART RATE: 65 BPM | BODY MASS INDEX: 28.35 KG/M2 | HEIGHT: 63 IN | OXYGEN SATURATION: 98 %

## 2021-05-16 VITALS — WEIGHT: 158 LBS | HEIGHT: 63 IN | BODY MASS INDEX: 28 KG/M2 | OXYGEN SATURATION: 98 % | HEART RATE: 62 BPM

## 2021-05-16 VITALS — HEIGHT: 63 IN | OXYGEN SATURATION: 97 % | BODY MASS INDEX: 28 KG/M2 | WEIGHT: 158 LBS | HEART RATE: 70 BPM

## 2021-05-28 ENCOUNTER — HOSPITAL ENCOUNTER (OUTPATIENT)
Dept: GENERAL RADIOLOGY | Facility: HOSPITAL | Age: 71
Discharge: HOME | End: 2021-05-28
Attending: FAMILY MEDICINE

## 2021-07-21 ENCOUNTER — TELEPHONE (OUTPATIENT)
Dept: FAMILY MEDICINE CLINIC | Facility: CLINIC | Age: 71
End: 2021-07-21

## 2021-07-21 RX ORDER — LOSARTAN POTASSIUM AND HYDROCHLOROTHIAZIDE 25; 100 MG/1; MG/1
TABLET ORAL
COMMUNITY
Start: 2021-01-19 | End: 2021-07-21 | Stop reason: SDUPTHER

## 2021-07-21 RX ORDER — LEVOTHYROXINE SODIUM 0.12 MG/1
125 TABLET ORAL DAILY
Qty: 90 TABLET | Refills: 1 | Status: SHIPPED | OUTPATIENT
Start: 2021-07-21 | End: 2022-10-13

## 2021-07-21 RX ORDER — LOSARTAN POTASSIUM AND HYDROCHLOROTHIAZIDE 25; 100 MG/1; MG/1
1 TABLET ORAL DAILY
Qty: 90 TABLET | Refills: 1 | Status: SHIPPED | OUTPATIENT
Start: 2021-07-21 | End: 2022-02-09

## 2021-07-21 RX ORDER — VALSARTAN AND HYDROCHLOROTHIAZIDE 160; 25 MG/1; MG/1
TABLET ORAL
COMMUNITY
End: 2021-07-21 | Stop reason: SDUPTHER

## 2021-07-21 RX ORDER — LEVOTHYROXINE SODIUM 0.12 MG/1
TABLET ORAL
COMMUNITY
Start: 2021-05-03 | End: 2021-07-21 | Stop reason: SDUPTHER

## 2021-07-21 NOTE — TELEPHONE ENCOUNTER
Caller: Marias Uribe    Relationship: Self    Best call back number: [phone]   Medication needed:   LOSARTIN 100-25 MG     Does the patient have less than a 3 day supply:  [] Yes  [x] No    What is the patient's preferred pharmacy: [facility]/PHARMACY #[id number] - [place], [place] - [address] - [phone] [facility] [phone] FX

## 2021-07-21 NOTE — TELEPHONE ENCOUNTER
Please let her know that I refilled the Losartan/HCTZ. Please make sure that she is not taking Valsartan that was on her med list. I DC'ed it.

## 2021-07-21 NOTE — TELEPHONE ENCOUNTER
Caller: Marisa Uribe    Relationship: Self    Best call back number: [phone]    Medication needed:   LEVOTHYROXINE 125MG     When do you need the refill by: ASAP    Does the patient have less than a 3 day supply:  [] Yes  [x] No    What is the patient's preferred pharmacy:      [facility]/pharmacy #[id number] - [place] - [address] - [phone] [facility] [phone]   [phone]

## 2021-10-04 DIAGNOSIS — I10 ESSENTIAL (PRIMARY) HYPERTENSION: ICD-10-CM

## 2021-10-04 RX ORDER — AMLODIPINE BESYLATE 2.5 MG/1
TABLET ORAL
Qty: 90 TABLET | Refills: 1 | Status: SHIPPED | OUTPATIENT
Start: 2021-10-04 | End: 2022-02-11 | Stop reason: SDUPTHER

## 2021-11-01 ENCOUNTER — TRANSCRIBE ORDERS (OUTPATIENT)
Dept: LAB | Facility: HOSPITAL | Age: 71
End: 2021-11-01

## 2021-11-01 ENCOUNTER — LAB (OUTPATIENT)
Dept: LAB | Facility: HOSPITAL | Age: 71
End: 2021-11-01

## 2021-11-01 DIAGNOSIS — M65.9 SAPHO SYNDROME (HCC): ICD-10-CM

## 2021-11-01 DIAGNOSIS — M86.9 SAPHO SYNDROME (HCC): ICD-10-CM

## 2021-11-01 DIAGNOSIS — L70.9 SAPHO SYNDROME (HCC): ICD-10-CM

## 2021-11-01 DIAGNOSIS — E03.9 MYXEDEMA HEART DISEASE: ICD-10-CM

## 2021-11-01 DIAGNOSIS — L40.3 SAPHO SYNDROME (HCC): ICD-10-CM

## 2021-11-01 DIAGNOSIS — I10 ESSENTIAL HYPERTENSION, MALIGNANT: ICD-10-CM

## 2021-11-01 DIAGNOSIS — I51.9 MYXEDEMA HEART DISEASE: ICD-10-CM

## 2021-11-01 DIAGNOSIS — I51.9 MYXEDEMA HEART DISEASE: Primary | ICD-10-CM

## 2021-11-01 DIAGNOSIS — M85.80 SAPHO SYNDROME (HCC): ICD-10-CM

## 2021-11-01 DIAGNOSIS — E03.9 MYXEDEMA HEART DISEASE: Primary | ICD-10-CM

## 2021-11-01 LAB
ALBUMIN SERPL-MCNC: 4.4 G/DL (ref 3.5–5.2)
ALBUMIN/GLOB SERPL: 1.8 G/DL
ALP SERPL-CCNC: 75 U/L (ref 39–117)
ALT SERPL W P-5'-P-CCNC: 13 U/L (ref 1–33)
ANION GAP SERPL CALCULATED.3IONS-SCNC: 6.8 MMOL/L (ref 5–15)
AST SERPL-CCNC: 14 U/L (ref 1–32)
BASOPHILS # BLD AUTO: 0.04 10*3/MM3 (ref 0–0.2)
BASOPHILS NFR BLD AUTO: 0.7 % (ref 0–1.5)
BILIRUB SERPL-MCNC: 0.3 MG/DL (ref 0–1.2)
BUN SERPL-MCNC: 21 MG/DL (ref 8–23)
BUN/CREAT SERPL: 31.3 (ref 7–25)
CALCIUM SPEC-SCNC: 9.1 MG/DL (ref 8.6–10.5)
CHLORIDE SERPL-SCNC: 105 MMOL/L (ref 98–107)
CHOLEST SERPL-MCNC: 212 MG/DL (ref 0–200)
CO2 SERPL-SCNC: 29.2 MMOL/L (ref 22–29)
CREAT SERPL-MCNC: 0.67 MG/DL (ref 0.57–1)
DEPRECATED RDW RBC AUTO: 38.4 FL (ref 37–54)
EOSINOPHIL # BLD AUTO: 0.22 10*3/MM3 (ref 0–0.4)
EOSINOPHIL NFR BLD AUTO: 3.9 % (ref 0.3–6.2)
ERYTHROCYTE [DISTWIDTH] IN BLOOD BY AUTOMATED COUNT: 12.4 % (ref 12.3–15.4)
GFR SERPL CREATININE-BSD FRML MDRD: 87 ML/MIN/1.73
GLOBULIN UR ELPH-MCNC: 2.5 GM/DL
GLUCOSE SERPL-MCNC: 89 MG/DL (ref 65–99)
HCT VFR BLD AUTO: 39.3 % (ref 34–46.6)
HDLC SERPL-MCNC: 65 MG/DL (ref 40–60)
HGB BLD-MCNC: 13.5 G/DL (ref 12–15.9)
IMM GRANULOCYTES # BLD AUTO: 0.03 10*3/MM3 (ref 0–0.05)
IMM GRANULOCYTES NFR BLD AUTO: 0.5 % (ref 0–0.5)
LDLC SERPL CALC-MCNC: 134 MG/DL (ref 0–100)
LDLC/HDLC SERPL: 2.04 {RATIO}
LYMPHOCYTES # BLD AUTO: 1.62 10*3/MM3 (ref 0.7–3.1)
LYMPHOCYTES NFR BLD AUTO: 28.4 % (ref 19.6–45.3)
MCH RBC QN AUTO: 29.2 PG (ref 26.6–33)
MCHC RBC AUTO-ENTMCNC: 34.4 G/DL (ref 31.5–35.7)
MCV RBC AUTO: 84.9 FL (ref 79–97)
MONOCYTES # BLD AUTO: 0.37 10*3/MM3 (ref 0.1–0.9)
MONOCYTES NFR BLD AUTO: 6.5 % (ref 5–12)
NEUTROPHILS NFR BLD AUTO: 3.42 10*3/MM3 (ref 1.7–7)
NEUTROPHILS NFR BLD AUTO: 60 % (ref 42.7–76)
NRBC BLD AUTO-RTO: 0 /100 WBC (ref 0–0.2)
PLATELET # BLD AUTO: 193 10*3/MM3 (ref 140–450)
PMV BLD AUTO: 9.4 FL (ref 6–12)
POTASSIUM SERPL-SCNC: 3.9 MMOL/L (ref 3.5–5.2)
PROT SERPL-MCNC: 6.9 G/DL (ref 6–8.5)
RBC # BLD AUTO: 4.63 10*6/MM3 (ref 3.77–5.28)
SODIUM SERPL-SCNC: 141 MMOL/L (ref 136–145)
T4 FREE SERPL-MCNC: 1.56 NG/DL (ref 0.93–1.7)
TRIGL SERPL-MCNC: 72 MG/DL (ref 0–150)
TSH SERPL DL<=0.05 MIU/L-ACNC: 1.89 UIU/ML (ref 0.27–4.2)
VLDLC SERPL-MCNC: 13 MG/DL (ref 5–40)
WBC # BLD AUTO: 5.7 10*3/MM3 (ref 3.4–10.8)

## 2021-11-01 PROCEDURE — 80061 LIPID PANEL: CPT

## 2021-11-01 PROCEDURE — 80053 COMPREHEN METABOLIC PANEL: CPT

## 2021-11-01 PROCEDURE — 85025 COMPLETE CBC W/AUTO DIFF WBC: CPT

## 2021-11-01 PROCEDURE — 36415 COLL VENOUS BLD VENIPUNCTURE: CPT

## 2021-11-01 PROCEDURE — 84439 ASSAY OF FREE THYROXINE: CPT

## 2021-11-01 PROCEDURE — 84443 ASSAY THYROID STIM HORMONE: CPT

## 2021-11-03 ENCOUNTER — OFFICE VISIT (OUTPATIENT)
Dept: FAMILY MEDICINE CLINIC | Facility: CLINIC | Age: 71
End: 2021-11-03

## 2021-11-03 ENCOUNTER — HOSPITAL ENCOUNTER (OUTPATIENT)
Dept: GENERAL RADIOLOGY | Facility: HOSPITAL | Age: 71
Discharge: HOME | End: 2021-11-03
Admitting: FAMILY MEDICINE

## 2021-11-03 VITALS
TEMPERATURE: 97.7 F | BODY MASS INDEX: 29.59 KG/M2 | WEIGHT: 167 LBS | HEIGHT: 63 IN | DIASTOLIC BLOOD PRESSURE: 80 MMHG | SYSTOLIC BLOOD PRESSURE: 158 MMHG | HEART RATE: 73 BPM | OXYGEN SATURATION: 97 %

## 2021-11-03 DIAGNOSIS — M25.552 LEFT HIP PAIN: ICD-10-CM

## 2021-11-03 DIAGNOSIS — I10 ESSENTIAL HYPERTENSION: Chronic | ICD-10-CM

## 2021-11-03 DIAGNOSIS — E78.2 MIXED HYPERLIPIDEMIA: Primary | Chronic | ICD-10-CM

## 2021-11-03 DIAGNOSIS — E03.9 ACQUIRED HYPOTHYROIDISM: Chronic | ICD-10-CM

## 2021-11-03 DIAGNOSIS — E66.3 OVERWEIGHT WITH BODY MASS INDEX (BMI) OF 29 TO 29.9 IN ADULT: Chronic | ICD-10-CM

## 2021-11-03 PROCEDURE — 73502 X-RAY EXAM HIP UNI 2-3 VIEWS: CPT

## 2021-11-03 PROCEDURE — 99214 OFFICE O/P EST MOD 30 MIN: CPT | Performed by: FAMILY MEDICINE

## 2021-11-03 RX ORDER — FAMOTIDINE 20 MG/1
20 TABLET, FILM COATED ORAL 2 TIMES DAILY
Qty: 60 TABLET | Refills: 2 | Status: SHIPPED | OUTPATIENT
Start: 2021-11-03 | End: 2022-01-25

## 2021-11-03 NOTE — ASSESSMENT & PLAN NOTE
Patient's (Body mass index is 29.58 kg/m².) indicates that they are overweight with health conditions that include hypertension and dyslipidemias . Weight is unchanged. BMI is is above average; BMI management plan is completed. We discussed low calorie, low carb based diet program, portion control and increasing exercise.

## 2021-11-03 NOTE — ASSESSMENT & PLAN NOTE
Her hypothyroidism is currently well controlled. She will be continued on her levothyroxine 125 mcg daily.

## 2021-11-03 NOTE — PROGRESS NOTES
"Chief Complaint  Follow-up (thyroid medication, labs on 11/01/2021)    Subjective          Marisa Uribe presents to [facility]  She is here today for a follow-up for her chronic medical conditions.  She has a past medical history significant for hypertension, HLD, GERD and hypothyroidism.  She is a previous smoker and smoked for 16 years.  She smoked approximate 2 packs a day.  She occasionally uses alcohol.  She has a family history of coronary artery disease and lung cancer.    She has not been checking her blood pressure at home.     She has not been eating as good as she did in the past. She has been eating out more and eating more sweets.     She is complaining of left hip pain at today's visit. She says that it has been present for several months. She denies trauma to her hip.     The patient has no other complaints today and denies chest pain, shortness of breath, weakness, numbness, nausea, vomiting, diarrhea, dizziness or syncopal event.        Objective   Vital Signs:   /80   Pulse 73   Temp 97.7 °F (36.5 °C) (Temporal)   Ht 160 cm (63\")   Wt 75.8 kg (167 lb)   SpO2 97%   BMI 29.58 kg/m²     Physical Exam  Vitals reviewed.   Constitutional:       Appearance: Normal appearance. She is well-developed.   HENT:      Head: Normocephalic and atraumatic.      Right Ear: External ear normal.      Left Ear: External ear normal.      Mouth/Throat:      Pharynx: No oropharyngeal exudate.   Eyes:      Conjunctiva/sclera: Conjunctivae normal.      Pupils: Pupils are equal, round, and reactive to light.   Neck:      Vascular: No carotid bruit.   Cardiovascular:      Rate and Rhythm: Normal rate and regular rhythm.      Heart sounds: No murmur heard.  No friction rub. No gallop.    Pulmonary:      Effort: Pulmonary effort is normal.      Breath sounds: Normal breath sounds. No wheezing or rhonchi.   Abdominal:      General: Bowel sounds are normal. There is no distension.    "   Palpations: Abdomen is soft.      Tenderness: There is no abdominal tenderness.   Skin:     General: Skin is warm and dry.   Neurological:      Mental Status: She is alert and oriented to person, place, and time.      Cranial Nerves: No cranial nerve deficit.      Motor: No weakness.   Psychiatric:         Mood and Affect: Mood and affect normal.         Behavior: Behavior normal.         Thought Content: Thought content normal.         Judgment: Judgment normal.        Result Review :     CMP    CMP 4/20/21 11/1/21   Glucose  89   BUN  21   Creatinine  0.67   eGFR Non African Am  87   Sodium  141   Potassium 3.6 3.9   Chloride  105   Calcium  9.1   Albumin  4.40   Total Bilirubin  0.3   Alkaline Phosphatase  75   AST (SGOT)  14   ALT (SGPT)  13           CBC    CBC 11/1/21   WBC 5.70   RBC 4.63   Hemoglobin 13.5   Hematocrit 39.3   MCV 84.9   MCH 29.2   MCHC 34.4   RDW 12.4   Platelets 193           Lipid Panel    Lipid Panel 11/1/21   Total Cholesterol 212 (A)   Triglycerides 72   HDL Cholesterol 65 (A)   VLDL Cholesterol 13   LDL Cholesterol  134 (A)   LDL/HDL Ratio 2.04   (A) Abnormal value            TSH    TSH 4/29/21 11/1/21   TSH 0.856 1.890                     Assessment and Plan    Diagnoses and all orders for this visit:    1. Mixed hyperlipidemia (Primary)  Assessment & Plan:  Lipid abnormalities are newly identified.  Nutritional counseling was provided.  Lipids will be reassessed in 6 months.    The 10-year ASCVD risk score (Elliottmicaela VILLEGAS Jr., et al., 2013) is: 18.4%    Values used to calculate the score:      Age: 71 years      Sex: Female      Is Non- : No      Diabetic: No      Tobacco smoker: No      Systolic Blood Pressure: 158 mmHg      Is BP treated: Yes      HDL Cholesterol: 65 mg/dL      Total Cholesterol: 212 mg/dL        2. Essential hypertension  Assessment & Plan:  Hypertension is improving with treatment.  Continue current treatment regimen.  Dietary sodium  restriction.  Weight loss.  Blood pressure will be reassessed in 3 months.      3. Acquired hypothyroidism  Assessment & Plan:  Her hypothyroidism is currently well controlled. She will be continued on her levothyroxine 125 mcg daily.       4. Left hip pain  Comments:  Her hip pain appears to be due to arthriits.   Orders:  -     XR Hip With or Without Pelvis 2 - 3 View Left; Future    5. Overweight with body mass index (BMI) of 29 to 29.9 in adult  Assessment & Plan:  Patient's (Body mass index is 29.58 kg/m².) indicates that they are overweight with health conditions that include hypertension and dyslipidemias . Weight is unchanged. BMI is is above average; BMI management plan is completed. We discussed low calorie, low carb based diet program, portion control and increasing exercise.       Other orders  -     famotidine (Pepcid) 20 MG tablet; Take 1 tablet by mouth 2 (Two) Times a Day.  Dispense: 60 tablet; Refill: 2      Follow Up   Return in about 3 months (around 2/3/2022).  Patient was given instructions and counseling regarding her condition or for health maintenance advice. Please see specific information pulled into the AVS if appropriate.

## 2021-11-03 NOTE — ASSESSMENT & PLAN NOTE
Lipid abnormalities are newly identified.  Nutritional counseling was provided.  Lipids will be reassessed in 6 months.    The 10-year ASCVD risk score (Elliott VILLEGAS Jr., et al., 2013) is: 18.4%    Values used to calculate the score:      Age: 71 years      Sex: Female      Is Non- : No      Diabetic: No      Tobacco smoker: No      Systolic Blood Pressure: 158 mmHg      Is BP treated: Yes      HDL Cholesterol: 65 mg/dL      Total Cholesterol: 212 mg/dL

## 2021-11-04 NOTE — PROGRESS NOTES
Cholesterol levels were high would recommend low-fat diet and possibly medication to reduce your risk of stroke given your high blood pressure.  Follow-up with Dr. Goel on any further suggestions.  All other labs look great

## 2022-01-06 RX ORDER — LOSARTAN POTASSIUM AND HYDROCHLOROTHIAZIDE 25; 100 MG/1; MG/1
TABLET ORAL
Qty: 90 TABLET | Refills: 1 | OUTPATIENT
Start: 2022-01-06

## 2022-01-25 RX ORDER — FAMOTIDINE 20 MG/1
TABLET, FILM COATED ORAL
Qty: 180 TABLET | Refills: 2 | Status: SHIPPED | OUTPATIENT
Start: 2022-01-25 | End: 2022-11-08

## 2022-02-04 ENCOUNTER — TELEPHONE (OUTPATIENT)
Dept: FAMILY MEDICINE CLINIC | Facility: CLINIC | Age: 72
End: 2022-02-04

## 2022-02-04 NOTE — TELEPHONE ENCOUNTER
The patient called and her blood pressure is running 170/100 for the past two days. I told her to double her Amlodipine from 2.5 to 5 mg. If she develops SOB, CP, vision change go to the ER.

## 2022-02-09 RX ORDER — LOSARTAN POTASSIUM AND HYDROCHLOROTHIAZIDE 25; 100 MG/1; MG/1
TABLET ORAL
Qty: 90 TABLET | Refills: 1 | Status: SHIPPED | OUTPATIENT
Start: 2022-02-09 | End: 2022-02-14 | Stop reason: SDUPTHER

## 2022-02-11 ENCOUNTER — OFFICE VISIT (OUTPATIENT)
Dept: FAMILY MEDICINE CLINIC | Facility: CLINIC | Age: 72
End: 2022-02-11

## 2022-02-11 VITALS
SYSTOLIC BLOOD PRESSURE: 139 MMHG | RESPIRATION RATE: 16 BRPM | HEART RATE: 64 BPM | TEMPERATURE: 98.3 F | HEIGHT: 63 IN | OXYGEN SATURATION: 97 % | DIASTOLIC BLOOD PRESSURE: 80 MMHG | WEIGHT: 164.9 LBS | BODY MASS INDEX: 29.22 KG/M2

## 2022-02-11 DIAGNOSIS — I10 ESSENTIAL HYPERTENSION: Primary | Chronic | ICD-10-CM

## 2022-02-11 DIAGNOSIS — E66.3 OVERWEIGHT WITH BODY MASS INDEX (BMI) OF 29 TO 29.9 IN ADULT: Chronic | ICD-10-CM

## 2022-02-11 DIAGNOSIS — I10 ESSENTIAL (PRIMARY) HYPERTENSION: ICD-10-CM

## 2022-02-11 DIAGNOSIS — E03.9 ACQUIRED HYPOTHYROIDISM: Chronic | ICD-10-CM

## 2022-02-11 PROCEDURE — 99214 OFFICE O/P EST MOD 30 MIN: CPT | Performed by: FAMILY MEDICINE

## 2022-02-11 RX ORDER — AMLODIPINE BESYLATE 10 MG/1
10 TABLET ORAL DAILY
Qty: 90 TABLET | Refills: 1 | Status: SHIPPED | OUTPATIENT
Start: 2022-02-11 | End: 2022-03-11 | Stop reason: SDUPTHER

## 2022-02-11 NOTE — ASSESSMENT & PLAN NOTE
Patient's (Body mass index is 29.21 kg/m².) indicates that they are overweight with health conditions that include hypertension and GERD . Weight is improving with treatment. BMI is is above average; BMI management plan is completed. We discussed low calorie, low carb based diet program, portion control and increasing exercise.

## 2022-02-11 NOTE — ASSESSMENT & PLAN NOTE
The patient's hypothyroidism is currently well controlled with 125mcg levothyroxine daily.  At her next clinic appointment we will give her a lab order for repeat thyroid level to be managed according to findings.

## 2022-02-11 NOTE — ASSESSMENT & PLAN NOTE
Hypertension is worsening.  Dietary sodium restriction.  Weight loss.  Medication changes per orders.  Blood pressure will be reassessed at the next regular appointment.

## 2022-02-11 NOTE — PROGRESS NOTES
"Chief Complaint  Hypertension, Leg Pain (left ), and Knee Pain (left )    Subjective          Marisa Uribe presents to [facility]  She is here today for a follow-up for the management of her right wrist fracture.  She has a past medical history significant for hypertension, GERD and hypothyroidism.  She is a previous smoker and smoked for 16 years.  She smoked approximate 2 packs a day.  She occasionally uses alcohol.  She has a family history of coronary artery disease and lung cancer.    The patient blood pressures been running elevated for several months.  Last weekend it increased to about 170 systolically.  She called the on-call service and her amlodipine was increased from 2-1/2 to 7-1/2 mg daily.  Her blood pressures been improving ever since.    The patient has no other complaints today and denies chest pain, shortness of breath, weakness, numbness, nausea, vomiting, diarrhea, dizziness or syncopal event.        Objective   Vital Signs:   /80 (BP Location: Left arm, Patient Position: Sitting)   Pulse 64   Temp 98.3 °F (36.8 °C)   Resp 16   Ht 160 cm (63\")   Wt 74.8 kg (164 lb 14.4 oz)   SpO2 97%   BMI 29.21 kg/m²     Physical Exam  Vitals reviewed.   Constitutional:       Appearance: Normal appearance. She is well-developed. She is obese.   HENT:      Head: Normocephalic and atraumatic.      Right Ear: External ear normal.      Left Ear: External ear normal.      Mouth/Throat:      Pharynx: No oropharyngeal exudate.   Eyes:      Conjunctiva/sclera: Conjunctivae normal.      Pupils: Pupils are equal, round, and reactive to light.   Neck:      Vascular: No carotid bruit.   Cardiovascular:      Rate and Rhythm: Normal rate and regular rhythm.      Heart sounds: No murmur heard.  No friction rub. No gallop.    Pulmonary:      Effort: Pulmonary effort is normal.      Breath sounds: Normal breath sounds. No wheezing or rhonchi.   Abdominal:      General: There is no " distension.   Skin:     General: Skin is warm and dry.   Neurological:      Mental Status: She is alert and oriented to person, place, and time.      Cranial Nerves: No cranial nerve deficit.      Motor: No weakness.   Psychiatric:         Mood and Affect: Mood and affect normal.         Behavior: Behavior normal.         Thought Content: Thought content normal.         Judgment: Judgment normal.        Result Review :     CMP    CMP 4/20/21 11/1/21   Glucose  89   BUN  21   Creatinine  0.67   eGFR Non African Am  87   Sodium  141   Potassium 3.6 3.9   Chloride  105   Calcium  9.1   Albumin  4.40   Total Bilirubin  0.3   Alkaline Phosphatase  75   AST (SGOT)  14   ALT (SGPT)  13           CBC    CBC 11/1/21   WBC 5.70   RBC 4.63   Hemoglobin 13.5   Hematocrit 39.3   MCV 84.9   MCH 29.2   MCHC 34.4   RDW 12.4   Platelets 193           Lipid Panel    Lipid Panel 11/1/21   Total Cholesterol 212 (A)   Triglycerides 72   HDL Cholesterol 65 (A)   VLDL Cholesterol 13   LDL Cholesterol  134 (A)   LDL/HDL Ratio 2.04   (A) Abnormal value            TSH    TSH 4/29/21 11/1/21   TSH 0.856 1.890                     Assessment and Plan    Diagnoses and all orders for this visit:    1. Essential hypertension (Primary)  Assessment & Plan:  Hypertension is worsening.  Dietary sodium restriction.  Weight loss.  Medication changes per orders.  Blood pressure will be reassessed at the next regular appointment.         amLODIPine (NORVASC) 10 MG tablet; Take 1 tablet by mouth Daily.  Dispense: 90 tablet; Refill: 1        2. Overweight with body mass index (BMI) of 29 to 29.9 in adult  Assessment & Plan:  Patient's (Body mass index is 29.21 kg/m².) indicates that they are overweight with health conditions that include hypertension and GERD . Weight is improving with treatment. BMI is is above average; BMI management plan is completed. We discussed low calorie, low carb based diet program, portion control and increasing exercise.       3.  Acquired hypothyroidism  Assessment & Plan:  The patient's hypothyroidism is currently well controlled with 125mcg levothyroxine daily.  At her next clinic appointment we will give her a lab order for repeat thyroid level to be managed according to findings.        Follow Up   Return in about 3 months (around 5/11/2022).  Patient was given instructions and counseling regarding her condition or for health maintenance advice. Please see specific information pulled into the AVS if appropriate.

## 2022-02-14 ENCOUNTER — TELEPHONE (OUTPATIENT)
Dept: FAMILY MEDICINE CLINIC | Facility: CLINIC | Age: 72
End: 2022-02-14

## 2022-02-14 RX ORDER — LOSARTAN POTASSIUM AND HYDROCHLOROTHIAZIDE 25; 100 MG/1; MG/1
1 TABLET ORAL DAILY
Qty: 90 TABLET | Refills: 1 | Status: SHIPPED | OUTPATIENT
Start: 2022-02-14 | End: 2022-02-16 | Stop reason: SDUPTHER

## 2022-02-14 NOTE — TELEPHONE ENCOUNTER
Caller: Dukemc Marisa S    Relationship: Self    Best call back number: [phone]     Requested Prescriptions:   Requested Prescriptions     Pending Prescriptions Disp Refills   • losartan-hydrochlorothiazide (HYZAAR) 100-25 MG per tablet 90 tablet 1     Sig: Take 1 tablet by mouth Daily.        Pharmacy where request should be sent: [facility]ACY #[id number] - [place] - [address] [facility] [phone]  - [phone] FX     Additional details provided by patient: PATIENT IS NEEDING A REFILL. PLEASE CALL AND ADVISE.     Does the patient have less than a 3 day supply:  [x] Yes  [] No    Tosha Gale, Deidra Rep   02/14/22 09:15 EST

## 2022-02-16 ENCOUNTER — TELEPHONE (OUTPATIENT)
Dept: FAMILY MEDICINE CLINIC | Facility: CLINIC | Age: 72
End: 2022-02-16

## 2022-02-16 RX ORDER — LOSARTAN POTASSIUM AND HYDROCHLOROTHIAZIDE 25; 100 MG/1; MG/1
1 TABLET ORAL DAILY
Qty: 90 TABLET | Refills: 1 | Status: SHIPPED | OUTPATIENT
Start: 2022-02-16

## 2022-02-16 NOTE — TELEPHONE ENCOUNTER
Caller: Marisa Uribe    Relationship: Self    Best call back number: [phone]    What is the best time to reach you: ANY    Who are you requesting to speak with (clinical staff, provider,  specific staff member): CLINICAL    What was the call regarding: PATIENT REQUESTED REFILL OF losartan-hydrochlorothiazide (HYZAAR) 100-25 MG per tablet IN LAST ENCOUNTER BUT [facility] IS BACK ORDERED. SHE IS REQUESTING PHARMACY CHANGE.     [facility] [address] [facility] [address]  [phone] [facility] [phone] FX     Do you require a callback: YES

## 2022-03-11 ENCOUNTER — OFFICE VISIT (OUTPATIENT)
Dept: FAMILY MEDICINE CLINIC | Facility: CLINIC | Age: 72
End: 2022-03-11

## 2022-03-11 VITALS
HEART RATE: 77 BPM | WEIGHT: 165.5 LBS | BODY MASS INDEX: 29.32 KG/M2 | RESPIRATION RATE: 18 BRPM | TEMPERATURE: 97 F | SYSTOLIC BLOOD PRESSURE: 128 MMHG | DIASTOLIC BLOOD PRESSURE: 74 MMHG | OXYGEN SATURATION: 97 % | HEIGHT: 63 IN

## 2022-03-11 DIAGNOSIS — M16.12 PRIMARY OSTEOARTHRITIS OF LEFT HIP: Chronic | ICD-10-CM

## 2022-03-11 DIAGNOSIS — I10 ESSENTIAL (PRIMARY) HYPERTENSION: ICD-10-CM

## 2022-03-11 DIAGNOSIS — E66.3 OVERWEIGHT WITH BODY MASS INDEX (BMI) OF 29 TO 29.9 IN ADULT: Chronic | ICD-10-CM

## 2022-03-11 DIAGNOSIS — I10 ESSENTIAL HYPERTENSION: Chronic | ICD-10-CM

## 2022-03-11 DIAGNOSIS — M62.838 MUSCLE SPASM: Primary | ICD-10-CM

## 2022-03-11 PROCEDURE — 99214 OFFICE O/P EST MOD 30 MIN: CPT | Performed by: FAMILY MEDICINE

## 2022-03-11 RX ORDER — AMLODIPINE BESYLATE 5 MG/1
5 TABLET ORAL DAILY
Qty: 90 TABLET | Refills: 1 | Status: SHIPPED | OUTPATIENT
Start: 2022-03-11 | End: 2022-07-28

## 2022-03-11 NOTE — ASSESSMENT & PLAN NOTE
The patient has tenderness with palpation of her left gastrocnemius.  She also has muscle ropiness.  She was encouraged to  400 mg of magnesium and take it daily.  She was told and demonstrated stretching exercises for the gastrocnemius.  She was told if her symptoms not improved call back.

## 2022-03-11 NOTE — ASSESSMENT & PLAN NOTE
The patient was educated about hip arthritis and worse located.  We will monitor her symptoms and manage according to findings.

## 2022-03-11 NOTE — PROGRESS NOTES
"Chief Complaint  Hypertension and Leg Swelling (Bilateral and ankles )    Subjective          Marisa Uribe presents to [facility]  She is here today for management of her chronic medical conditions.  She has a past medical history significant for hypertension, GERD, hypothyroidism.  She is a previous smoker and smoked for 16 years.  Smokes approximately 2 packs a day.  She occasionally uses alcohol.  She has family history of coronary artery disease and lung cancer.    She is complaining today of having some mild ankle swelling from time to time.  She is also complaining of a pain in the back of her left lower extremity.  She says that is been present for the last couple days.  She denies overt shortness of breath or history of blood clots.    The patient is also complaining of pain in her left upper lateral groin region with standing and movement.    The patient has no other complaints today and denies chest pain, shortness of breath, weakness, numbness, nausea, vomiting, diarrhea, dizziness or syncopal event.        Objective   Vital Signs:   /74 (BP Location: Left arm, Patient Position: Sitting)   Pulse 77   Temp 97 °F (36.1 °C)   Resp 18   Ht 160 cm (62.99\")   Wt 75.1 kg (165 lb 8 oz)   SpO2 97%   BMI 29.32 kg/m²     Physical Exam  Vitals reviewed.   Constitutional:       Appearance: Normal appearance. She is well-developed.   HENT:      Head: Normocephalic and atraumatic.      Right Ear: External ear normal.      Left Ear: External ear normal.      Mouth/Throat:      Pharynx: No oropharyngeal exudate.   Eyes:      Conjunctiva/sclera: Conjunctivae normal.      Pupils: Pupils are equal, round, and reactive to light.   Neck:      Vascular: No carotid bruit.   Cardiovascular:      Rate and Rhythm: Normal rate and regular rhythm.      Heart sounds: No murmur heard.    No friction rub. No gallop.   Pulmonary:      Effort: Pulmonary effort is normal.      Breath sounds: " Normal breath sounds. No wheezing or rhonchi.   Abdominal:      General: There is no distension.   Musculoskeletal:        Legs:       Comments: Tenderness and ropiness of the gastrocnemius of the left lower extremity.  No appreciated edema of lower extremity.   Skin:     General: Skin is warm and dry.   Neurological:      Mental Status: She is alert and oriented to person, place, and time.      Cranial Nerves: No cranial nerve deficit.      Motor: No weakness.   Psychiatric:         Mood and Affect: Mood and affect normal.         Behavior: Behavior normal.         Thought Content: Thought content normal.         Judgment: Judgment normal.        Result Review :     CMP    CMP 4/20/21 11/1/21   Glucose  89   BUN  21   Creatinine  0.67   eGFR Non African Am  87   Sodium  141   Potassium 3.6 3.9   Chloride  105   Calcium  9.1   Albumin  4.40   Total Bilirubin  0.3   Alkaline Phosphatase  75   AST (SGOT)  14   ALT (SGPT)  13           CBC    CBC 11/1/21   WBC 5.70   RBC 4.63   Hemoglobin 13.5   Hematocrit 39.3   MCV 84.9   MCH 29.2   MCHC 34.4   RDW 12.4   Platelets 193           Lipid Panel    Lipid Panel 11/1/21   Total Cholesterol 212 (A)   Triglycerides 72   HDL Cholesterol 65 (A)   VLDL Cholesterol 13   LDL Cholesterol  134 (A)   LDL/HDL Ratio 2.04   (A) Abnormal value            TSH    TSH 4/29/21 11/1/21   TSH 0.856 1.890                     Assessment and Plan    Diagnoses and all orders for this visit:    1. Muscle spasm (Primary)  Assessment & Plan:  The patient has tenderness with palpation of her left gastrocnemius.  She also has muscle ropiness.  She was encouraged to  400 mg of magnesium and take it daily.  She was told and demonstrated stretching exercises for the gastrocnemius.  She was told if her symptoms not improved call back.      2. Essential hypertension  Assessment & Plan:  Hypertension is improving with treatment.  Continue current treatment regimen.  Dietary sodium restriction.  Weight  loss.  Blood pressure will be reassessed at the next regular appointment.    -     amLODIPine (NORVASC) 5 MG tablet; Take 1 tablet by mouth Daily.  Dispense: 90 tablet; Refill: 1      3. Overweight with body mass index (BMI) of 29 to 29.9 in adult  Assessment & Plan:  Patient's (Body mass index is 29.32 kg/m².) indicates that they are overweight with health conditions that include hypertension . Weight is unchanged. BMI is is above average; BMI management plan is completed. We discussed low calorie, low carb based diet program, portion control and increasing exercise.       4. Primary osteoarthritis of left hip  Assessment & Plan:  The patient was educated about hip arthritis and worse located.  We will monitor her symptoms and manage according to findings.          Follow Up   Return in about 3 months (around 6/11/2022).  Patient was given instructions and counseling regarding her condition or for health maintenance advice. Please see specific information pulled into the AVS if appropriate.

## 2022-03-11 NOTE — ASSESSMENT & PLAN NOTE
Patient's (Body mass index is 29.32 kg/m².) indicates that they are overweight with health conditions that include hypertension . Weight is unchanged. BMI is is above average; BMI management plan is completed. We discussed low calorie, low carb based diet program, portion control and increasing exercise.

## 2022-03-23 DIAGNOSIS — I10 ESSENTIAL (PRIMARY) HYPERTENSION: ICD-10-CM

## 2022-03-23 RX ORDER — AMLODIPINE BESYLATE 2.5 MG/1
TABLET ORAL
Qty: 90 TABLET | Refills: 1 | Status: SHIPPED | OUTPATIENT
Start: 2022-03-23 | End: 2022-07-28

## 2022-05-12 ENCOUNTER — HOSPITAL ENCOUNTER (OUTPATIENT)
Dept: GENERAL RADIOLOGY | Facility: HOSPITAL | Age: 72
Discharge: HOME | End: 2022-05-12
Admitting: FAMILY MEDICINE

## 2022-05-12 ENCOUNTER — OFFICE VISIT (OUTPATIENT)
Dept: FAMILY MEDICINE CLINIC | Facility: CLINIC | Age: 72
End: 2022-05-12

## 2022-05-12 VITALS
OXYGEN SATURATION: 97 % | HEART RATE: 62 BPM | WEIGHT: 163.8 LBS | BODY MASS INDEX: 29.02 KG/M2 | HEIGHT: 63 IN | RESPIRATION RATE: 18 BRPM | SYSTOLIC BLOOD PRESSURE: 132 MMHG | DIASTOLIC BLOOD PRESSURE: 76 MMHG | TEMPERATURE: 98.6 F

## 2022-05-12 DIAGNOSIS — E66.3 OVERWEIGHT WITH BODY MASS INDEX (BMI) OF 29 TO 29.9 IN ADULT: Chronic | ICD-10-CM

## 2022-05-12 DIAGNOSIS — M25.552 LEFT HIP PAIN: ICD-10-CM

## 2022-05-12 DIAGNOSIS — M25.562 ACUTE PAIN OF LEFT KNEE: ICD-10-CM

## 2022-05-12 DIAGNOSIS — M79.605 LEFT LEG PAIN: ICD-10-CM

## 2022-05-12 DIAGNOSIS — Z00.00 MEDICARE ANNUAL WELLNESS VISIT, SUBSEQUENT: Primary | ICD-10-CM

## 2022-05-12 DIAGNOSIS — I10 ESSENTIAL HYPERTENSION: Chronic | ICD-10-CM

## 2022-05-12 PROCEDURE — 73502 X-RAY EXAM HIP UNI 2-3 VIEWS: CPT

## 2022-05-12 PROCEDURE — 1125F AMNT PAIN NOTED PAIN PRSNT: CPT | Performed by: FAMILY MEDICINE

## 2022-05-12 PROCEDURE — 99214 OFFICE O/P EST MOD 30 MIN: CPT | Performed by: FAMILY MEDICINE

## 2022-05-12 PROCEDURE — 1159F MED LIST DOCD IN RCRD: CPT | Performed by: FAMILY MEDICINE

## 2022-05-12 PROCEDURE — 73590 X-RAY EXAM OF LOWER LEG: CPT

## 2022-05-12 PROCEDURE — 73562 X-RAY EXAM OF KNEE 3: CPT

## 2022-05-12 PROCEDURE — G0439 PPPS, SUBSEQ VISIT: HCPCS | Performed by: FAMILY MEDICINE

## 2022-05-12 PROCEDURE — 1170F FXNL STATUS ASSESSED: CPT | Performed by: FAMILY MEDICINE

## 2022-05-12 RX ORDER — DICLOFENAC SODIUM 75 MG/1
75 TABLET, DELAYED RELEASE ORAL 2 TIMES DAILY
Qty: 60 TABLET | Refills: 2 | Status: SHIPPED | OUTPATIENT
Start: 2022-05-12 | End: 2022-07-28

## 2022-05-12 NOTE — PATIENT INSTRUCTIONS
Advance Care Planning and Advance Directives     You make decisions on a daily basis - decisions about where you want to live, your career, your home, your life. Perhaps one of the most important decisions you face is your choice for future medical care. Take time to talk with your family and your healthcare team and start planning today.  Advance Care Planning is a process that can help you:  Understand possible future healthcare decisions in light of your own experiences  Reflect on those decision in light of your goals and values  Discuss your decisions with those closest to you and the healthcare professionals that care for you  Make a plan by creating a document that reflects your wishes    Surrogate Decision Maker  In the event of a medical emergency, which has left you unable to communicate or to make your own decisions, you would need someone to make decisions for you.  It is important to discuss your preferences for medical treatment with this person while you are in good health.     Qualities of a surrogate decision maker:  Willing to take on this role and responsibility  Knows what you want for future medical care  Willing to follow your wishes even if they don't agree with them  Able to make difficult medical decisions under stressful circumstances    Advance Directives  These are legal documents you can create that will guide your healthcare team and decision maker(s) when needed. These documents can be stored in the electronic medical record.    Living Will - a legal document to guide your care if you have a terminal condition or a serious illness and are unable to communicate. States vary by statute in document names/types, but most forms may include one or more of the following:        -  Directions regarding life-prolonging treatments        -  Directions regarding artificially provided nutrition/hydration        -  Choosing a healthcare decision maker        -  Direction regarding organ/tissue  donation    Durable Power of  for Healthcare - this document names an -in-fact to make medical decisions for you, but it may also allow this person to make personal and financial decisions for you. Please seek the advice of an  if you need this type of document.    **Advance Directives are not required and no one may discriminate against you if you do not sign one.    Medical Orders  Many states allow specific forms/orders signed by your physician to record your wishes for medical treatment in your current state of health. This form, signed in personal communication with your physician, addresses resuscitation and other medical interventions that you may or may not want.      For more information or to schedule a time with a [facility] Advance Care Planning Facilitator contact: [email or web address] or call [phone] and someone will contact you directly.

## 2022-05-12 NOTE — ASSESSMENT & PLAN NOTE
Patient's (Body mass index is 29.02 kg/m².) indicates that they are overweight with health conditions that include hypertension . Weight is unchanged. BMI is is above average; BMI management plan is completed. We discussed low calorie, low carb based diet program, portion control and increasing exercise.

## 2022-05-12 NOTE — PROGRESS NOTES
The ABCs of the Annual Wellness Visit  Subsequent Medicare Wellness Visit    Chief Complaint   Patient presents with   • Hypertension     Check on BP, has multiple dosing for BP meds   • Leg Pain     Left leg, shin area hurts a lot, has trouble getting up from sitting position, hurts all the way to left hip, takes a minute to get walking upright   • Heartburn     Taking medication and drinking apple cider vinegar      Subjective    History of Present Illness:  Marisa Uribe is a 71 y.o. female who presents for a Subsequent Medicare Wellness Visit.    The following portions of the patient's history were reviewed and   updated as appropriate: allergies, current medications, past family history, past medical history, past social history, past surgical history and problem list.    Compared to one year ago, the patient feels her physical   health is worse.    Compared to one year ago, the patient feels her mental   health is worse.    Recent Hospitalizations:  She was not admitted to the hospital during the last year.       Current Medical Providers:  Patient Care Team:  Amy Goel DO as PCP - General (Family Medicine)    Outpatient Medications Prior to Visit   Medication Sig Dispense Refill   • amLODIPine (NORVASC) 2.5 MG tablet TAKE 1 TABLET BY MOUTH EVERY DAY 90 tablet 1   • amLODIPine (NORVASC) 5 MG tablet Take 1 tablet by mouth Daily. 90 tablet 1   • Calcium Carb-Cholecalciferol (CALCIUM + VITAMIN D3 PO) Take 1 tablet by mouth Daily. Calcium, Vitamin D and K     • famotidine (PEPCID) 20 MG tablet TAKE 1 TABLET BY MOUTH TWICE A  tablet 2   • levothyroxine (SYNTHROID, LEVOTHROID) 125 MCG tablet Take 1 tablet by mouth Daily. 90 tablet 1   • losartan-hydrochlorothiazide (HYZAAR) 100-25 MG per tablet Take 1 tablet by mouth Daily. 90 tablet 1   • Nutritional Supplements (NUTRITIONAL SUPPLEMENT PO) Take 1 packet by mouth 3 (Three) Times a Day. 1 packet TID  for minor aches and pains       No  "facility-administered medications prior to visit.       No opioid medication identified on active medication list. I have reviewed chart for other potential  high risk medication/s and harmful drug interactions in the elderly.          Aspirin is not on active medication list.  Aspirin use is not indicated based on review of current medical condition/s. Risk of harm outweighs potential benefits.  .    Patient Active Problem List   Diagnosis   • Acquired hypothyroidism   • Essential hypertension   • Mixed hyperlipidemia   • Overweight with body mass index (BMI) of 29 to 29.9 in adult   • Muscle spasm   • Osteoarthritis of left hip   • Medicare annual wellness visit, subsequent     Advance Care Planning  Advance Directive is not on file.  ACP discussion was held with the patient during this visit. Patient does not have an advance directive, information provided.    Review of Systems   HENT: Negative for trouble swallowing.    Eyes: Negative for visual disturbance.   Respiratory: Negative for apnea.    Cardiovascular: Negative for chest pain.   Gastrointestinal: Negative for blood in stool.   Endocrine: Negative for polydipsia.   Genitourinary: Negative for dysuria.   Musculoskeletal: Positive for arthralgias, gait problem and myalgias.   Skin: Negative for color change.   Allergic/Immunologic: Negative for immunocompromised state.   Neurological: Negative for seizures and confusion.   Hematological: Negative for adenopathy.        Objective    Vitals:    05/12/22 0922   BP: 132/76   BP Location: Left arm   Patient Position: Sitting   Pulse: 62   Resp: 18   Temp: 98.6 °F (37 °C)   SpO2: 97%   Weight: 74.3 kg (163 lb 12.8 oz)   Height: 160 cm (63\")   PainSc:   2   PainLoc: Leg  Comment: left     BMI Readings from Last 1 Encounters:   05/12/22 29.02 kg/m²   BMI is above normal parameters. Recommendations include: exercise counseling and nutrition counseling    Does the patient have evidence of cognitive impairment? " No    Physical Exam  Vitals reviewed.   Constitutional:       Appearance: Normal appearance. She is well-developed. She is obese.   HENT:      Head: Normocephalic and atraumatic.      Right Ear: External ear normal.      Left Ear: External ear normal.      Mouth/Throat:      Pharynx: No oropharyngeal exudate.   Eyes:      Conjunctiva/sclera: Conjunctivae normal.      Pupils: Pupils are equal, round, and reactive to light.   Neck:      Vascular: No carotid bruit.   Cardiovascular:      Rate and Rhythm: Normal rate and regular rhythm.      Heart sounds: No murmur heard.    No friction rub. No gallop.   Pulmonary:      Effort: Pulmonary effort is normal.      Breath sounds: Normal breath sounds. No wheezing or rhonchi.   Abdominal:      General: There is no distension.   Skin:     General: Skin is warm and dry.   Neurological:      Mental Status: She is alert and oriented to person, place, and time.      Cranial Nerves: No cranial nerve deficit.      Motor: No weakness.   Psychiatric:         Mood and Affect: Mood and affect normal.         Behavior: Behavior normal.         Thought Content: Thought content normal.         Judgment: Judgment normal.                 HEALTH RISK ASSESSMENT    Smoking Status:  Social History     Tobacco Use   Smoking Status Former Smoker   • Packs/day: 2.00   • Start date: 1967   • Quit date: 1982   • Years since quittin.3   Smokeless Tobacco Never Used     Alcohol Consumption:  Social History     Substance and Sexual Activity   Alcohol Use Yes   • Alcohol/week: 1.0 standard drink   • Types: 1 Glasses of wine per week    Comment: rare occasion     Fall Risk Screen:    [facility] Fall Risk Assessment has not been completed.    Depression Screening:  PHQ-2/PHQ-9 Depression Screening 3/11/2022   Retired PHQ-9 Total Score -   Retired Total Score -   Little Interest or Pleasure in Doing Things 0-->not at all   Feeling Down, Depressed or Hopeless 0-->not at all   PHQ-9: Brief  Depression Severity Measure Score 0       Health Habits and Functional and Cognitive Screening:  Functional & Cognitive Status 5/12/2022   Do you have difficulty preparing food and eating? No   Do you have difficulty bathing yourself, getting dressed or grooming yourself? No   Do you have difficulty using the toilet? No   Do you have difficulty moving around from place to place? Yes   Do you have trouble with steps or getting out of a bed or a chair? Yes   Current Diet Well Balanced Diet   Dental Exam Up to date   Eye Exam Up to date   Exercise (times per week) 4 times per week   Current Exercises Include House Cleaning;Walking;Yard Work   Do you need help using the phone?  No   Are you deaf or do you have serious difficulty hearing?  Yes   Do you need help with transportation? No   Do you need help shopping? No   Do you need help preparing meals?  No   Do you need help with housework?  No   Do you need help with laundry? No   Do you need help taking your medications? No   Do you need help managing money? No   Do you ever drive or ride in a car without wearing a seat belt? No   Have you felt unusual stress, anger or loneliness in the last month? No   Who do you live with? Spouse   If you need help, do you have trouble finding someone available to you? No   Have you been bothered in the last four weeks by sexual problems? No   Do you have difficulty concentrating, remembering or making decisions? No       Age-appropriate Screening Schedule:  Refer to the list below for future screening recommendations based on patient's age, sex and/or medical conditions. Orders for these recommended tests are listed in the plan section. The patient has been provided with a written plan.    Health Maintenance   Topic Date Due   • ZOSTER VACCINE (1 of 2) 05/12/2022 (Originally 10/27/2001)   • TDAP/TD VACCINES (1 - Tdap) 11/03/2022 (Originally [birth year]/1970)   • INFLUENZA VACCINE  08/01/2022   • LIPID PANEL  11/01/2022   • DXA SCAN   04/05/2023   • MAMMOGRAM  05/28/2023              Assessment & Plan   CMS Preventative Services Quick Reference  Risk Factors Identified During Encounter  Obesity/Overweight   The above risks/problems have been discussed with the patient.  Follow up actions/plans if indicated are seen below in the Assessment/Plan Section.  Pertinent information has been shared with the patient in the After Visit Summary.    Diagnoses and all orders for this visit:    1. Medicare annual wellness visit, subsequent (Primary)    2. Left leg pain  Comments:  Symptoms sound suspicious for arthritis.  X-ray order placed today to be managed according to findings.  Diclofenac sent to pharmacy.  Orders:  -     XR Tibia Fibula 2 View Left; Future    3. Left hip pain  Comments:  Patient was given order today for x-ray.  Symptoms sound suspicious for arthritis.  Diclofenac sent to pharmacy.  Orders:  -     XR Hip With or Without Pelvis 2 - 3 View Left; Future    4. Acute pain of left knee  Comments:  Patient was given order today for an x-ray.  Symptoms sound suspicious for arthritis.  Diclofenac sent to the pharmacy.  Orders:  -     XR Knee 3 View Left; Future    5. Overweight with body mass index (BMI) of 29 to 29.9 in adult  Assessment & Plan:  Patient's (Body mass index is 29.02 kg/m².) indicates that they are overweight with health conditions that include hypertension . Weight is unchanged. BMI is is above average; BMI management plan is completed. We discussed low calorie, low carb based diet program, portion control and increasing exercise.       6. Essential hypertension  Assessment & Plan:  Hypertension is improving with treatment.  Continue current treatment regimen.  Dietary sodium restriction.  Weight loss.  Blood pressure will be reassessed at the next regular appointment.      Other orders  -     diclofenac (VOLTAREN) 75 MG EC tablet; Take 1 tablet by mouth 2 (Two) Times a Day.  Dispense: 60 tablet; Refill: 2      Follow Up:    Return in about 4 weeks (around 6/9/2022).     An After Visit Summary and PPPS were made available to the patient.

## 2022-07-22 ENCOUNTER — TRANSCRIBE ORDERS (OUTPATIENT)
Dept: ADMINISTRATIVE | Facility: HOSPITAL | Age: 72
End: 2022-07-22

## 2022-07-22 DIAGNOSIS — Z12.31 VISIT FOR SCREENING MAMMOGRAM: Primary | ICD-10-CM

## 2022-07-28 ENCOUNTER — OFFICE VISIT (OUTPATIENT)
Dept: FAMILY MEDICINE CLINIC | Facility: CLINIC | Age: 72
End: 2022-07-28

## 2022-07-28 VITALS
RESPIRATION RATE: 18 BRPM | WEIGHT: 165.3 LBS | HEIGHT: 63 IN | OXYGEN SATURATION: 99 % | HEART RATE: 63 BPM | DIASTOLIC BLOOD PRESSURE: 70 MMHG | TEMPERATURE: 98 F | SYSTOLIC BLOOD PRESSURE: 133 MMHG | BODY MASS INDEX: 29.29 KG/M2

## 2022-07-28 DIAGNOSIS — E55.9 VITAMIN D DEFICIENCY: ICD-10-CM

## 2022-07-28 DIAGNOSIS — K21.9 GASTROESOPHAGEAL REFLUX DISEASE WITHOUT ESOPHAGITIS: Primary | ICD-10-CM

## 2022-07-28 DIAGNOSIS — E78.2 MIXED HYPERLIPIDEMIA: Chronic | ICD-10-CM

## 2022-07-28 DIAGNOSIS — I10 ESSENTIAL HYPERTENSION: Chronic | ICD-10-CM

## 2022-07-28 DIAGNOSIS — Z00.00 ANNUAL PHYSICAL EXAM: ICD-10-CM

## 2022-07-28 DIAGNOSIS — E03.9 ACQUIRED HYPOTHYROIDISM: Chronic | ICD-10-CM

## 2022-07-28 PROCEDURE — 99214 OFFICE O/P EST MOD 30 MIN: CPT | Performed by: FAMILY MEDICINE

## 2022-07-28 RX ORDER — OMEPRAZOLE 40 MG/1
40 CAPSULE, DELAYED RELEASE ORAL DAILY
Start: 2022-07-28 | End: 2022-09-13 | Stop reason: SDUPTHER

## 2022-07-28 RX ORDER — AMLODIPINE BESYLATE 10 MG/1
10 TABLET ORAL DAILY
COMMUNITY
Start: 2022-05-16 | End: 2022-08-11

## 2022-07-28 NOTE — ASSESSMENT & PLAN NOTE
She is currently on 125 mcg daily. She will be given a lab order today for a thyroid profile to be managed in 6 months.

## 2022-07-28 NOTE — ASSESSMENT & PLAN NOTE
She was told to  OTC omeprazole 40 mg daily. She was told to continue the pepcid PRN. We will follow-up at her next clinic appointment.

## 2022-07-28 NOTE — PROGRESS NOTES
"Chief Complaint  Arthritis, Heartburn (Pepcid not working ), Medication Problem, and Dizziness    Subjective        Marisa Uribe presents to [facility]  She is here today for management of her chronic medical conditions and for an acute visit.  She has hypertension, GERD, hypothyroidism.  She is a previous smoker and smoked for 16 years.  Smokes approximately 2 packs a day.  She occasionally uses alcohol.  She has family history of coronary artery disease and lung cancer.     She is having worsening reflux that pepcid is not taking care of.      The patient has no other complaints today and denies chest pain, shortness of breath, weakness, numbness, nausea, vomiting, diarrhea, dizziness or syncopal event.      Objective   Vital Signs:  /70 (BP Location: Left arm, Patient Position: Sitting)   Pulse 63   Temp 98 °F (36.7 °C)   Resp 18   Ht 160 cm (62.99\")   Wt 75 kg (165 lb 4.8 oz)   SpO2 99%   BMI 29.29 kg/m²   Estimated body mass index is 29.29 kg/m² as calculated from the following:    Height as of this encounter: 160 cm (62.99\").    Weight as of this encounter: 75 kg (165 lb 4.8 oz).          Physical Exam  Vitals reviewed.   Constitutional:       Appearance: Normal appearance. She is well-developed.   HENT:      Head: Normocephalic and atraumatic.      Right Ear: External ear normal.      Left Ear: External ear normal.      Mouth/Throat:      Pharynx: No oropharyngeal exudate.   Eyes:      Conjunctiva/sclera: Conjunctivae normal.      Pupils: Pupils are equal, round, and reactive to light.   Neck:      Vascular: No carotid bruit.   Cardiovascular:      Rate and Rhythm: Normal rate and regular rhythm.      Heart sounds: No murmur heard.    No friction rub. No gallop.   Pulmonary:      Effort: Pulmonary effort is normal.      Breath sounds: Normal breath sounds. No wheezing or rhonchi.   Abdominal:      General: There is no distension.   Skin:     General: Skin is " warm and dry.   Neurological:      Mental Status: She is alert and oriented to person, place, and time.      Cranial Nerves: No cranial nerve deficit.      Motor: No weakness.   Psychiatric:         Mood and Affect: Mood and affect normal.         Behavior: Behavior normal.         Thought Content: Thought content normal.         Judgment: Judgment normal.        Result Review :    CMP    CMP 11/1/21   Glucose 89   BUN 21   Creatinine 0.67   eGFR Non African Am 87   Sodium 141   Potassium 3.9   Chloride 105   Calcium 9.1   Albumin 4.40   Total Bilirubin 0.3   Alkaline Phosphatase 75   AST (SGOT) 14   ALT (SGPT) 13           CBC    CBC 11/1/21   WBC 5.70   RBC 4.63   Hemoglobin 13.5   Hematocrit 39.3   MCV 84.9   MCH 29.2   MCHC 34.4   RDW 12.4   Platelets 193           Lipid Panel    Lipid Panel 11/1/21   Total Cholesterol 212 (A)   Triglycerides 72   HDL Cholesterol 65 (A)   VLDL Cholesterol 13   LDL Cholesterol  134 (A)   LDL/HDL Ratio 2.04   (A) Abnormal value            TSH    TSH 11/1/21   TSH 1.890                     Assessment and Plan   Diagnoses and all orders for this visit:    1. Gastroesophageal reflux disease without esophagitis (Primary)  Assessment & Plan:  She was told to  OTC omeprazole 40 mg daily. She was told to continue the pepcid PRN. We will follow-up at her next clinic appointment.       2. Acquired hypothyroidism  Assessment & Plan:  She is currently on 125 mcg daily. She will be given a lab order today for a thyroid profile to be managed in 6 months.     Orders:  -     TSH+Free T4; Future    3. Essential hypertension  Assessment & Plan:  Hypertension is improving with treatment.  Continue current treatment regimen.  Dietary sodium restriction.  Weight loss.  Blood pressure will be reassessed at the next regular appointment.      4. Mixed hyperlipidemia  Assessment & Plan:  Lipid abnormalities are improving with lifestyle modifications.  Nutritional counseling was provided.  Lipids  will be reassessed in 6 months.    The 10-year ASCVD risk score ([place] Crossem[place]ela VILLEGAS Jr., et al., 2013) is: 13.3%    Values used to calculate the score:      Age: 71 years      Sex: Female      Is Non- : No      Diabetic: No      Tobacco smoker: No      Systolic Blood Pressure: 133 mmHg      Is BP treated: Yes      HDL Cholesterol: 65 mg/dL      Total Cholesterol: 212 mg/dL      Orders:  -     Lipid Panel; Future    5. Annual physical exam  -     Comprehensive Metabolic Panel; Future  -     CBC & Differential; Future    6. Vitamin D deficiency  -     Vitamin D 25 hydroxy; Future    Other orders  -     omeprazole (priLOSEC) 40 MG capsule; Take 1 capsule by mouth Daily.           Follow Up   Return in about 6 months (around 1/28/2023).  Patient was given instructions and counseling regarding her condition or for health maintenance advice. Please see specific information pulled into the AVS if appropriate.

## 2022-07-28 NOTE — ASSESSMENT & PLAN NOTE
Lipid abnormalities are improving with lifestyle modifications.  Nutritional counseling was provided.  Lipids will be reassessed in 6 months.    The 10-year ASCVD risk score (Elliott VILLEGAS Jr., et al., 2013) is: 13.3%    Values used to calculate the score:      Age: 71 years      Sex: Female      Is Non- : No      Diabetic: No      Tobacco smoker: No      Systolic Blood Pressure: 133 mmHg      Is BP treated: Yes      HDL Cholesterol: 65 mg/dL      Total Cholesterol: 212 mg/dL

## 2022-08-11 DIAGNOSIS — I10 ESSENTIAL (PRIMARY) HYPERTENSION: ICD-10-CM

## 2022-08-11 RX ORDER — AMLODIPINE BESYLATE 10 MG/1
TABLET ORAL
Qty: 90 TABLET | Refills: 1 | Status: SHIPPED | OUTPATIENT
Start: 2022-08-11 | End: 2023-02-03

## 2022-09-13 ENCOUNTER — TELEPHONE (OUTPATIENT)
Dept: FAMILY MEDICINE CLINIC | Facility: CLINIC | Age: 72
End: 2022-09-13

## 2022-09-13 NOTE — TELEPHONE ENCOUNTER
Caller: Jojo Marisa S    Relationship: Self    Best call back number: [phone]    Requested Prescriptions:   Requested Prescriptions     Pending Prescriptions Disp Refills   • omeprazole (priLOSEC) 40 MG capsule       Sig: Take 1 capsule by mouth Daily.        Pharmacy where request should be sent: [facility]Y #[id number] - [place], [place] - [address] [facility] - [phone]  - [phone]     Additional details provided by patient: PATIENT STATES SHE DID NOT WANT TO TAKE THIS A FIRST TRYING ALTERNATIVE METHODS BUT STATES THAT SHE WOULD LIKE TO TRY THIS NOW AND PLEASE CALL THIS IN FOR HER.      Does the patient have less than a 3 day supply:  [x] Yes  [] No    Deidra Lagunas Rep   09/13/22 09:44 EDT

## 2022-09-14 RX ORDER — OMEPRAZOLE 40 MG/1
40 CAPSULE, DELAYED RELEASE ORAL DAILY
Qty: 90 CAPSULE | Refills: 1
Start: 2022-09-14

## 2022-09-28 ENCOUNTER — APPOINTMENT (OUTPATIENT)
Dept: MAMMOGRAPHY | Facility: HOSPITAL | Age: 72
End: 2022-09-28

## 2022-09-30 ENCOUNTER — HOSPITAL ENCOUNTER (OUTPATIENT)
Dept: MAMMOGRAPHY | Facility: HOSPITAL | Age: 72
Discharge: HOME | End: 2022-09-30
Admitting: FAMILY MEDICINE

## 2022-09-30 DIAGNOSIS — Z12.31 VISIT FOR SCREENING MAMMOGRAM: ICD-10-CM

## 2022-09-30 PROCEDURE — 77067 SCR MAMMO BI INCL CAD: CPT

## 2022-09-30 PROCEDURE — 77063 BREAST TOMOSYNTHESIS BI: CPT

## 2022-10-13 RX ORDER — LEVOTHYROXINE SODIUM 0.12 MG/1
TABLET ORAL
Qty: 90 TABLET | Refills: 1 | Status: SHIPPED | OUTPATIENT
Start: 2022-10-13 | End: 2022-11-11 | Stop reason: SDUPTHER

## 2022-11-08 RX ORDER — FAMOTIDINE 20 MG/1
TABLET, FILM COATED ORAL
Qty: 180 TABLET | Refills: 2 | Status: SHIPPED | OUTPATIENT
Start: 2022-11-08 | End: 2023-02-03

## 2022-11-12 RX ORDER — LEVOTHYROXINE SODIUM 0.12 MG/1
125 TABLET ORAL DAILY
Qty: 90 TABLET | Refills: 1 | Status: SHIPPED | OUTPATIENT
Start: 2022-11-12

## 2022-11-22 ENCOUNTER — LAB (OUTPATIENT)
Dept: LAB | Facility: HOSPITAL | Age: 72
End: 2022-11-22

## 2022-11-22 DIAGNOSIS — E03.9 ACQUIRED HYPOTHYROIDISM: Chronic | ICD-10-CM

## 2022-11-22 DIAGNOSIS — Z00.00 ANNUAL PHYSICAL EXAM: ICD-10-CM

## 2022-11-22 DIAGNOSIS — E55.9 VITAMIN D DEFICIENCY: ICD-10-CM

## 2022-11-22 DIAGNOSIS — E78.2 MIXED HYPERLIPIDEMIA: Chronic | ICD-10-CM

## 2022-11-22 LAB
25(OH)D3 SERPL-MCNC: 64.5 NG/ML (ref 30–100)
ALBUMIN SERPL-MCNC: 4.5 G/DL (ref 3.5–5.2)
ALBUMIN/GLOB SERPL: 2.3 G/DL
ALP SERPL-CCNC: 62 U/L (ref 39–117)
ALT SERPL W P-5'-P-CCNC: 14 U/L (ref 1–33)
ANION GAP SERPL CALCULATED.3IONS-SCNC: 9 MMOL/L (ref 5–15)
AST SERPL-CCNC: 16 U/L (ref 1–32)
BASOPHILS # BLD AUTO: 0.03 10*3/MM3 (ref 0–0.2)
BASOPHILS NFR BLD AUTO: 0.6 % (ref 0–1.5)
BILIRUB SERPL-MCNC: 0.5 MG/DL (ref 0–1.2)
BUN SERPL-MCNC: 14 MG/DL (ref 8–23)
BUN/CREAT SERPL: 17.1 (ref 7–25)
CALCIUM SPEC-SCNC: 9.1 MG/DL (ref 8.6–10.5)
CHLORIDE SERPL-SCNC: 105 MMOL/L (ref 98–107)
CHOLEST SERPL-MCNC: 203 MG/DL (ref 0–200)
CO2 SERPL-SCNC: 27 MMOL/L (ref 22–29)
CREAT SERPL-MCNC: 0.82 MG/DL (ref 0.57–1)
DEPRECATED RDW RBC AUTO: 37.6 FL (ref 37–54)
EGFRCR SERPLBLD CKD-EPI 2021: 76.6 ML/MIN/1.73
EOSINOPHIL # BLD AUTO: 0.19 10*3/MM3 (ref 0–0.4)
EOSINOPHIL NFR BLD AUTO: 3.9 % (ref 0.3–6.2)
ERYTHROCYTE [DISTWIDTH] IN BLOOD BY AUTOMATED COUNT: 12 % (ref 12.3–15.4)
GLOBULIN UR ELPH-MCNC: 2 GM/DL
GLUCOSE SERPL-MCNC: 85 MG/DL (ref 65–99)
HCT VFR BLD AUTO: 36.3 % (ref 34–46.6)
HDLC SERPL-MCNC: 73 MG/DL (ref 40–60)
HGB BLD-MCNC: 12.4 G/DL (ref 12–15.9)
IMM GRANULOCYTES # BLD AUTO: 0.01 10*3/MM3 (ref 0–0.05)
IMM GRANULOCYTES NFR BLD AUTO: 0.2 % (ref 0–0.5)
LDLC SERPL CALC-MCNC: 119 MG/DL (ref 0–100)
LDLC/HDLC SERPL: 1.61 {RATIO}
LYMPHOCYTES # BLD AUTO: 1.15 10*3/MM3 (ref 0.7–3.1)
LYMPHOCYTES NFR BLD AUTO: 23.9 % (ref 19.6–45.3)
MCH RBC QN AUTO: 29.3 PG (ref 26.6–33)
MCHC RBC AUTO-ENTMCNC: 34.2 G/DL (ref 31.5–35.7)
MCV RBC AUTO: 85.8 FL (ref 79–97)
MONOCYTES # BLD AUTO: 0.35 10*3/MM3 (ref 0.1–0.9)
MONOCYTES NFR BLD AUTO: 7.3 % (ref 5–12)
NEUTROPHILS NFR BLD AUTO: 3.09 10*3/MM3 (ref 1.7–7)
NEUTROPHILS NFR BLD AUTO: 64.1 % (ref 42.7–76)
NRBC BLD AUTO-RTO: 0 /100 WBC (ref 0–0.2)
PLATELET # BLD AUTO: 207 10*3/MM3 (ref 140–450)
PMV BLD AUTO: 9.6 FL (ref 6–12)
POTASSIUM SERPL-SCNC: 3.6 MMOL/L (ref 3.5–5.2)
PROT SERPL-MCNC: 6.5 G/DL (ref 6–8.5)
RBC # BLD AUTO: 4.23 10*6/MM3 (ref 3.77–5.28)
SODIUM SERPL-SCNC: 141 MMOL/L (ref 136–145)
T4 FREE SERPL-MCNC: 1.76 NG/DL (ref 0.93–1.7)
TRIGL SERPL-MCNC: 61 MG/DL (ref 0–150)
TSH SERPL DL<=0.05 MIU/L-ACNC: 3.6 UIU/ML (ref 0.27–4.2)
VLDLC SERPL-MCNC: 11 MG/DL (ref 5–40)
WBC NRBC COR # BLD: 4.82 10*3/MM3 (ref 3.4–10.8)

## 2022-11-22 PROCEDURE — 82306 VITAMIN D 25 HYDROXY: CPT

## 2022-11-22 PROCEDURE — 80053 COMPREHEN METABOLIC PANEL: CPT

## 2022-11-22 PROCEDURE — 84439 ASSAY OF FREE THYROXINE: CPT

## 2022-11-22 PROCEDURE — 80061 LIPID PANEL: CPT

## 2022-11-22 PROCEDURE — 85025 COMPLETE CBC W/AUTO DIFF WBC: CPT

## 2022-11-22 PROCEDURE — 36415 COLL VENOUS BLD VENIPUNCTURE: CPT

## 2022-11-22 PROCEDURE — 84443 ASSAY THYROID STIM HORMONE: CPT

## 2022-11-28 ENCOUNTER — OFFICE VISIT (OUTPATIENT)
Dept: FAMILY MEDICINE CLINIC | Facility: CLINIC | Age: 72
End: 2022-11-28

## 2022-11-28 VITALS
BODY MASS INDEX: 29.96 KG/M2 | HEIGHT: 63 IN | RESPIRATION RATE: 18 BRPM | SYSTOLIC BLOOD PRESSURE: 134 MMHG | WEIGHT: 169.1 LBS | TEMPERATURE: 97.5 F | OXYGEN SATURATION: 96 % | DIASTOLIC BLOOD PRESSURE: 75 MMHG | HEART RATE: 64 BPM

## 2022-11-28 DIAGNOSIS — I10 ESSENTIAL HYPERTENSION: Chronic | ICD-10-CM

## 2022-11-28 DIAGNOSIS — E78.2 MIXED HYPERLIPIDEMIA: Chronic | ICD-10-CM

## 2022-11-28 DIAGNOSIS — E03.9 ACQUIRED HYPOTHYROIDISM: Primary | Chronic | ICD-10-CM

## 2022-11-28 PROCEDURE — 99214 OFFICE O/P EST MOD 30 MIN: CPT | Performed by: FAMILY MEDICINE

## 2022-11-28 NOTE — ASSESSMENT & PLAN NOTE
She is currently doing well on 125 mcg of levothyroxine daily. A repeat thyroid level was ordered today to be managed according to findings.

## 2022-11-28 NOTE — PROGRESS NOTES
"Chief Complaint  Hypertension and Hypothyroidism    Jn Uribe presents to [facility]  History of Present Illness  She is here today for management of her chronic medical conditions.  She has hypertension, GERD, hypothyroidism.  She is a previous smoker and smoked for 16 years.  Smoked approximately 2 packs a day.  She occasionally uses alcohol.  She has family history of coronary artery disease and lung cancer.    She has a cyst on the back of her left had.     The patient has no other complaints today and denies chest pain, shortness of breath, weakness, numbness, nausea, vomiting, diarrhea, dizziness or syncopal event.      Objective   Vital Signs:  /75 (BP Location: Left arm, Patient Position: Sitting)   Pulse 64   Temp 97.5 °F (36.4 °C)   Resp 18   Ht 160 cm (62.99\")   Wt 76.7 kg (169 lb 1.6 oz)   SpO2 96%   BMI 29.96 kg/m²   Estimated body mass index is 29.96 kg/m² as calculated from the following:    Height as of this encounter: 160 cm (62.99\").    Weight as of this encounter: 76.7 kg (169 lb 1.6 oz).          Physical Exam  Vitals reviewed.   Constitutional:       Appearance: Normal appearance. She is well-developed.   HENT:      Head: Normocephalic and atraumatic.      Right Ear: External ear normal.      Left Ear: External ear normal.      Mouth/Throat:      Pharynx: No oropharyngeal exudate.   Eyes:      Conjunctiva/sclera: Conjunctivae normal.      Pupils: Pupils are equal, round, and reactive to light.   Neck:      Vascular: No carotid bruit.   Cardiovascular:      Rate and Rhythm: Normal rate and regular rhythm.      Heart sounds: No murmur heard.    No friction rub. No gallop.   Pulmonary:      Effort: Pulmonary effort is normal.      Breath sounds: Normal breath sounds. No wheezing or rhonchi.   Abdominal:      General: There is no distension.   Skin:     General: Skin is warm and dry.   Neurological:      Mental Status: She is alert " and oriented to person, place, and time.      Cranial Nerves: No cranial nerve deficit.      Motor: No weakness.   Psychiatric:         Mood and Affect: Mood and affect normal.         Behavior: Behavior normal.         Thought Content: Thought content normal.         Judgment: Judgment normal.        Result Review :    CMP    CMP 11/22/22   Glucose 85   BUN 14   Creatinine 0.82   Sodium 141   Potassium 3.6   Chloride 105   Calcium 9.1   Albumin 4.50   Total Bilirubin 0.5   Alkaline Phosphatase 62   AST (SGOT) 16   ALT (SGPT) 14           CBC    CBC 11/22/22   WBC 4.82   RBC 4.23   Hemoglobin 12.4   Hematocrit 36.3   MCV 85.8   MCH 29.3   MCHC 34.2   RDW 12.0 (A)   Platelets 207   (A) Abnormal value            Lipid Panel    Lipid Panel 11/22/22   Total Cholesterol 203 (A)   Triglycerides 61   HDL Cholesterol 73 (A)   VLDL Cholesterol 11   LDL Cholesterol  119 (A)   LDL/HDL Ratio 1.61   (A) Abnormal value            TSH    TSH 11/22/22   TSH 3.600                     Assessment and Plan   Diagnoses and all orders for this visit:    1. Acquired hypothyroidism (Primary)  Assessment & Plan:  She is currently doing well on 125 mcg of levothyroxine daily. A repeat thyroid level was ordered today to be managed according to findings.     Orders:  -     TSH+Free T4; Future    2. Essential hypertension  Assessment & Plan:  Hypertension is improving with treatment.  Continue current treatment regimen.  Dietary sodium restriction.  Weight loss.  Blood pressure will be reassessed at the next regular appointment.      3. Mixed hyperlipidemia  Assessment & Plan:  Lipid abnormalities are improving with lifestyle modifications.  Nutritional counseling was provided.  Lipids will be reassessed in 6 months.             Follow Up   Return in about 6 months (around 5/28/2023).  Patient was given instructions and counseling regarding her condition or for health maintenance advice. Please see specific information pulled into the AVS if  appropriate.

## 2022-12-02 ENCOUNTER — TELEPHONE (OUTPATIENT)
Dept: FAMILY MEDICINE CLINIC | Facility: CLINIC | Age: 72
End: 2022-12-02

## 2022-12-02 NOTE — TELEPHONE ENCOUNTER
Called patient back, gave her the last 2 blood pressure readings we had on file.  That was all she needed.

## 2022-12-02 NOTE — TELEPHONE ENCOUNTER
Caller: Marisa Uribe    Relationship: Self    Best call back number: [phone]    What is the best time to reach you: ANY    Who are you requesting to speak with (clinical staff, provider,  specific staff member): CLINICAL    What was the call regarding: PATIENT IS NEEDING HER BLOOD PRESSURE READINGS FROM HER LAST TWO OFFICE VISITS FOR INSURANCE PURPOSES.    Do you require a callback: YES

## 2023-02-03 ENCOUNTER — OFFICE VISIT (OUTPATIENT)
Dept: FAMILY MEDICINE CLINIC | Facility: CLINIC | Age: 73
End: 2023-02-03
Payer: MEDICARE

## 2023-02-03 ENCOUNTER — HOSPITAL ENCOUNTER (OUTPATIENT)
Dept: GENERAL RADIOLOGY | Facility: HOSPITAL | Age: 73
Discharge: HOME | End: 2023-02-03
Admitting: FAMILY MEDICINE
Payer: MEDICARE

## 2023-02-03 VITALS
DIASTOLIC BLOOD PRESSURE: 74 MMHG | BODY MASS INDEX: 30.05 KG/M2 | HEART RATE: 63 BPM | OXYGEN SATURATION: 97 % | SYSTOLIC BLOOD PRESSURE: 126 MMHG | WEIGHT: 169.6 LBS | HEIGHT: 63 IN | TEMPERATURE: 97.9 F | RESPIRATION RATE: 18 BRPM

## 2023-02-03 DIAGNOSIS — M54.50 CHRONIC BILATERAL LOW BACK PAIN WITHOUT SCIATICA: Primary | ICD-10-CM

## 2023-02-03 DIAGNOSIS — M54.50 CHRONIC BILATERAL LOW BACK PAIN WITHOUT SCIATICA: ICD-10-CM

## 2023-02-03 DIAGNOSIS — I10 ESSENTIAL HYPERTENSION: Chronic | ICD-10-CM

## 2023-02-03 DIAGNOSIS — I10 ESSENTIAL (PRIMARY) HYPERTENSION: ICD-10-CM

## 2023-02-03 DIAGNOSIS — G89.29 CHRONIC BILATERAL LOW BACK PAIN WITHOUT SCIATICA: ICD-10-CM

## 2023-02-03 DIAGNOSIS — G89.29 CHRONIC BILATERAL LOW BACK PAIN WITHOUT SCIATICA: Primary | ICD-10-CM

## 2023-02-03 DIAGNOSIS — K21.9 GASTROESOPHAGEAL REFLUX DISEASE WITHOUT ESOPHAGITIS: Chronic | ICD-10-CM

## 2023-02-03 PROBLEM — E66.3 OVERWEIGHT WITH BODY MASS INDEX (BMI) OF 29 TO 29.9 IN ADULT: Chronic | Status: RESOLVED | Noted: 2021-11-03 | Resolved: 2023-02-03

## 2023-02-03 PROCEDURE — 99214 OFFICE O/P EST MOD 30 MIN: CPT | Performed by: FAMILY MEDICINE

## 2023-02-03 PROCEDURE — 72100 X-RAY EXAM L-S SPINE 2/3 VWS: CPT

## 2023-02-03 RX ORDER — AMLODIPINE BESYLATE 10 MG/1
TABLET ORAL
Qty: 90 TABLET | Refills: 1 | Status: SHIPPED | OUTPATIENT
Start: 2023-02-03

## 2023-02-03 NOTE — ASSESSMENT & PLAN NOTE
The patient's GERD symptoms are currently fairly well controlled with omeprazole 40 mg daily.  We will continue her on this dosage and she was instructed take Tums if she has breakthrough dyspepsia.

## 2023-02-03 NOTE — PROGRESS NOTES
"Chief Complaint  Hip Pain (Left ), Leg Pain (Left ), and Heartburn    Subjective        Marisa Uribe presents to [facility]  History of Present Illness  She is here today for management of her acute and chronic medical conditions.  She has hypertension, GERD, hypothyroidism.  She is a previous smoker and smoked for 16 years.  Smoked approximately 2 packs a day.  She occasionally uses alcohol.  She has family history of coronary artery disease and lung cancer.     She has a cyst on the back of her left had.    She is complaining of low back pain today.      The patient has no other complaints today and denies chest pain, shortness of breath, weakness, numbness, nausea, vomiting, diarrhea, dizziness or syncopal event.      Objective   Vital Signs:  /74 (BP Location: Left arm, Patient Position: Sitting)   Pulse 63   Temp 97.9 °F (36.6 °C)   Resp 18   Ht 160 cm (62.99\")   Wt 76.9 kg (169 lb 9.6 oz)   SpO2 97%   BMI 30.05 kg/m²   Estimated body mass index is 30.05 kg/m² as calculated from the following:    Height as of this encounter: 160 cm (62.99\").    Weight as of this encounter: 76.9 kg (169 lb 9.6 oz).             Physical Exam  Vitals reviewed.   Constitutional:       Appearance: Normal appearance. She is well-developed. She is obese.   HENT:      Head: Normocephalic and atraumatic.      Right Ear: External ear normal.      Left Ear: External ear normal.      Mouth/Throat:      Pharynx: No oropharyngeal exudate.   Eyes:      Conjunctiva/sclera: Conjunctivae normal.      Pupils: Pupils are equal, round, and reactive to light.   Neck:      Vascular: No carotid bruit.   Cardiovascular:      Rate and Rhythm: Normal rate and regular rhythm.      Heart sounds: No murmur heard.    No friction rub. No gallop.   Pulmonary:      Effort: Pulmonary effort is normal.      Breath sounds: Normal breath sounds. No wheezing or rhonchi.   Abdominal:      General: There is no " distension.   Skin:     General: Skin is warm and dry.   Neurological:      Mental Status: She is alert and oriented to person, place, and time.      Cranial Nerves: No cranial nerve deficit.      Motor: No weakness.   Psychiatric:         Mood and Affect: Mood and affect normal.         Behavior: Behavior normal.         Thought Content: Thought content normal.         Judgment: Judgment normal.        Result Review :    CMP    CMP 11/22/22   Glucose 85   BUN 14   Creatinine 0.82   eGFR 76.6   Sodium 141   Potassium 3.6   Chloride 105   Calcium 9.1   Total Protein 6.5   Albumin 4.50   Globulin 2.0   Total Bilirubin 0.5   Alkaline Phosphatase 62   AST (SGOT) 16   ALT (SGPT) 14   Albumin/Globulin Ratio 2.3   BUN/Creatinine Ratio 17.1   Anion Gap 9.0      Comments are available for some flowsheets but are not being displayed.           CBC    CBC 11/22/22   WBC 4.82   RBC 4.23   Hemoglobin 12.4   Hematocrit 36.3   MCV 85.8   MCH 29.3   MCHC 34.2   RDW 12.0 (A)   Platelets 207   (A) Abnormal value            Lipid Panel    Lipid Panel 11/22/22   Total Cholesterol 203 (A)   Triglycerides 61   HDL Cholesterol 73 (A)   VLDL Cholesterol 11   LDL Cholesterol  119 (A)   LDL/HDL Ratio 1.61   (A) Abnormal value            TSH    TSH 11/22/22   TSH 3.600                        Assessment and Plan   Diagnoses and all orders for this visit:    1. Chronic bilateral low back pain without sciatica (Primary)  -     XR Spine Lumbar AP & Lateral; Future    2. Essential hypertension  Assessment & Plan:  Hypertension is improving with treatment.  Continue current treatment regimen.  Dietary sodium restriction.  Weight loss.  Blood pressure will be reassessed at the next regular appointment.      3. Gastroesophageal reflux disease without esophagitis  Assessment & Plan:  The patient's GERD symptoms are currently fairly well controlled with omeprazole 40 mg daily.  We will continue her on this dosage and she was instructed take Tums if she  has breakthrough dyspepsia.             Follow Up   Return if symptoms worsen or fail to improve.  Patient was given instructions and counseling regarding her condition or for health maintenance advice. Please see specific information pulled into the AVS if appropriate.

## 2023-02-13 ENCOUNTER — TELEPHONE (OUTPATIENT)
Dept: FAMILY MEDICINE CLINIC | Facility: CLINIC | Age: 73
End: 2023-02-13
Payer: MEDICARE

## 2023-02-13 NOTE — TELEPHONE ENCOUNTER
You ordered xrays of patients back at last visit on 2/3/23.  She is asking if you need to see her again now that xrays are completed?

## 2023-02-13 NOTE — TELEPHONE ENCOUNTER
I do not need to see her until her next visit unless she starts to have lower extremity weakness, numbness or pain.

## 2023-02-13 NOTE — TELEPHONE ENCOUNTER
Caller: Marisa Uribe    Relationship: Self    Best call back number: [phone]    What is the best time to reach you: ANYTIME     Who are you requesting to speak with (clinical staff, provider,  specific staff member): CLINICAL        What was the call regarding:     THE PATIENT SAID SHE RECEIVED HER XRAY RESULTS AND IS NOT SURE IF [facility] WANT HER TO DO A FOLLOW UP APPOINTMENT. SHE SAID SHE WOULD LIKE TO KNOW IF THAT IS NEEDED. THE PATIENT SAID SHE WOULD ALSO LIKE A COPY OF THE X-RAYS.       PLEASE CALL AND ADVISE PATIENT       Do you require a callback: YES

## 2023-02-14 NOTE — TELEPHONE ENCOUNTER
Spoke with patient, she is aware Dr. Goel does not need to see her again unless she is having more problems.  She also wants to  a copy of her xrays, this will be put on a disc for her and she can  from the office tomorrow.  Patient is aware.

## 2023-05-01 RX ORDER — LOSARTAN POTASSIUM AND HYDROCHLOROTHIAZIDE 25; 100 MG/1; MG/1
TABLET ORAL
Qty: 90 TABLET | Refills: 1 | Status: SHIPPED | OUTPATIENT
Start: 2023-05-01

## 2023-06-01 ENCOUNTER — LAB (OUTPATIENT)
Dept: LAB | Facility: HOSPITAL | Age: 73
End: 2023-06-01
Payer: MEDICARE

## 2023-06-01 DIAGNOSIS — E03.9 ACQUIRED HYPOTHYROIDISM: Chronic | ICD-10-CM

## 2023-06-01 LAB
T4 FREE SERPL-MCNC: 1.92 NG/DL (ref 0.93–1.7)
TSH SERPL DL<=0.05 MIU/L-ACNC: 1.3 UIU/ML (ref 0.27–4.2)

## 2023-06-01 PROCEDURE — 84439 ASSAY OF FREE THYROXINE: CPT

## 2023-06-01 PROCEDURE — 36415 COLL VENOUS BLD VENIPUNCTURE: CPT

## 2023-06-01 PROCEDURE — 84443 ASSAY THYROID STIM HORMONE: CPT

## 2023-06-02 RX ORDER — LEVOTHYROXINE SODIUM 112 UG/1
112 TABLET ORAL DAILY
Qty: 90 TABLET | Refills: 1 | Status: SHIPPED | OUTPATIENT
Start: 2023-06-02

## 2023-06-08 ENCOUNTER — OFFICE VISIT (OUTPATIENT)
Dept: FAMILY MEDICINE CLINIC | Facility: CLINIC | Age: 73
End: 2023-06-08
Payer: MEDICARE

## 2023-06-08 VITALS
HEIGHT: 63 IN | DIASTOLIC BLOOD PRESSURE: 81 MMHG | TEMPERATURE: 97.8 F | RESPIRATION RATE: 18 BRPM | HEART RATE: 58 BPM | BODY MASS INDEX: 29.46 KG/M2 | OXYGEN SATURATION: 100 % | WEIGHT: 166.3 LBS | SYSTOLIC BLOOD PRESSURE: 153 MMHG

## 2023-06-08 DIAGNOSIS — Z00.00 MEDICARE ANNUAL WELLNESS VISIT, SUBSEQUENT: Primary | ICD-10-CM

## 2023-06-08 DIAGNOSIS — E66.3 OVERWEIGHT WITH BODY MASS INDEX (BMI) OF 29 TO 29.9 IN ADULT: Chronic | ICD-10-CM

## 2023-06-08 DIAGNOSIS — Z12.31 ENCOUNTER FOR SCREENING MAMMOGRAM FOR MALIGNANT NEOPLASM OF BREAST: ICD-10-CM

## 2023-06-08 DIAGNOSIS — E55.9 VITAMIN D DEFICIENCY: ICD-10-CM

## 2023-06-08 DIAGNOSIS — Z78.0 POSTMENOPAUSE: ICD-10-CM

## 2023-06-08 DIAGNOSIS — I10 ESSENTIAL HYPERTENSION: Chronic | ICD-10-CM

## 2023-06-08 DIAGNOSIS — Z00.00 ANNUAL PHYSICAL EXAM: ICD-10-CM

## 2023-06-08 DIAGNOSIS — E03.9 ACQUIRED HYPOTHYROIDISM: Chronic | ICD-10-CM

## 2023-06-08 DIAGNOSIS — E78.2 MIXED HYPERLIPIDEMIA: Chronic | ICD-10-CM

## 2023-06-08 NOTE — ASSESSMENT & PLAN NOTE
Her most recent thyroid level was elevated 7 days ago. Her thyroid hormone was decreased and we will recheck in 6 months.

## 2023-06-08 NOTE — PROGRESS NOTES
The ABCs of the Annual Wellness Visit  Subsequent Medicare Wellness Visit    Subjective    Marisa Uribe is a 72 y.o. female who presents for a Subsequent Medicare Wellness Visit.    The following portions of the patient's history were reviewed and   updated as appropriate: allergies, current medications, past family history, past medical history, past social history, past surgical history, and problem list.    Compared to one year ago, the patient feels her physical   health is the same.    Compared to one year ago, the patient feels her mental   health is the same.    Recent Hospitalizations:  She was not admitted to the hospital during the last year.       Current Medical Providers:  Patient Care Team:  Amy Goel DO as PCP - General (Family Medicine)    Outpatient Medications Prior to Visit   Medication Sig Dispense Refill    amLODIPine (NORVASC) 10 MG tablet TAKE 1 TABLET BY MOUTH EVERY DAY 90 tablet 1    Calcium Carb-Cholecalciferol (CALCIUM + VITAMIN D3 PO) Take 1 tablet by mouth Daily. Calcium, Vitamin D and K      levothyroxine (Synthroid) 112 MCG tablet Take 1 tablet by mouth Daily. 90 tablet 1    losartan-hydrochlorothiazide (HYZAAR) 100-25 MG per tablet TAKE 1 TABLET BY MOUTH EVERY DAY 90 tablet 1    Nutritional Supplements (NUTRITIONAL SUPPLEMENT PO) Take 1 packet by mouth 3 (Three) Times a Day. 1 packet TID  for minor aches and pains      omeprazole (priLOSEC) 40 MG capsule Take 1 capsule by mouth Daily. 90 capsule 1     No facility-administered medications prior to visit.       No opioid medication identified on active medication list. I have reviewed chart for other potential  high risk medication/s and harmful drug interactions in the elderly.        Aspirin is not on active medication list.  Aspirin use is not indicated based on review of current medical condition/s. Risk of harm outweighs potential benefits.  .    Patient Active Problem List   Diagnosis    Acquired hypothyroidism    Essential  "hypertension    Mixed hyperlipidemia    Overweight with body mass index (BMI) of 29 to 29.9 in adult    Muscle spasm    Osteoarthritis of left hip    Medicare annual wellness visit, subsequent    Gastroesophageal reflux disease without esophagitis    Vitamin D deficiency    Chronic bilateral low back pain without sciatica     Advance Care Planning   Advance Care Planning     Advance Directive is not on file.  ACP discussion was held with the patient during this visit. Patient does not have an advance directive, information provided.     Objective    Vitals:    23 0808 23 0812   BP: 160/76 153/81   BP Location:  Left arm   Patient Position:  Sitting   Pulse: 58    Resp: 18    Temp: 97.8 °F (36.6 °C)    SpO2: 100%    Weight: 75.4 kg (166 lb 4.8 oz)    Height: 160 cm (62.99\")      Estimated body mass index is 29.47 kg/m² as calculated from the following:    Height as of this encounter: 160 cm (62.99\").    Weight as of this encounter: 75.4 kg (166 lb 4.8 oz).    BMI is >= 30 and <35. (Class 1 Obesity). The following options were offered after discussion;: weight loss educational material (shared in after visit summary), exercise counseling/recommendations, and nutrition counseling/recommendations      Does the patient have evidence of cognitive impairment? No          HEALTH RISK ASSESSMENT    Smoking Status:  Social History     Tobacco Use   Smoking Status Former    Packs/day: 2.00    Types: Cigarettes    Start date: 1967    Quit date: 1982    Years since quittin.4   Smokeless Tobacco Never   Tobacco Comments    no second hand smoke exposure     Alcohol Consumption:  Social History     Substance and Sexual Activity   Alcohol Use Yes    Alcohol/week: 1.0 standard drink    Types: 1 Glasses of wine per week    Comment: rare occasion     Fall Risk Screen:    STEADI Fall Risk Assessment was completed, and patient is at HIGH risk for falls. Assessment completed on:2023    Depression " Screenin/8/2023     8:05 AM   PHQ-2/PHQ-9 Depression Screening   Little Interest or Pleasure in Doing Things 0-->not at all   Feeling Down, Depressed or Hopeless 0-->not at all   PHQ-9: Brief Depression Severity Measure Score 0       Health Habits and Functional and Cognitive Screenin/8/2023     8:05 AM   Functional & Cognitive Status   Do you have difficulty preparing food and eating? No   Do you have difficulty bathing yourself, getting dressed or grooming yourself? No   Do you have difficulty using the toilet? No   Do you have difficulty moving around from place to place? No   Do you have trouble with steps or getting out of a bed or a chair? No   Current Diet Well Balanced Diet   Dental Exam Up to date   Eye Exam Up to date   Exercise (times per week) 5 times per week   Current Exercises Include Walking   Do you need help using the phone?  No   Are you deaf or do you have serious difficulty hearing?  Yes   Do you need help with transportation? No   Do you need help shopping? No   Do you need help preparing meals?  No   Do you need help with housework?  No   Do you need help with laundry? No   Do you need help taking your medications? No   Do you need help managing money? No   Do you ever drive or ride in a car without wearing a seat belt? No   Have you felt unusual stress, anger or loneliness in the last month? No   Who do you live with? Spouse   If you need help, do you have trouble finding someone available to you? No   Have you been bothered in the last four weeks by sexual problems? No       Age-appropriate Screening Schedule:  Refer to the list below for future screening recommendations based on patient's age, sex and/or medical conditions. Orders for these recommended tests are listed in the plan section. The patient has been provided with a written plan.    Health Maintenance   Topic Date Due    DXA SCAN  2023    COVID-19 Vaccine (3 - Pfizer series) 06/10/2023 (Originally  5/31/2021)    ZOSTER VACCINE (1 of 2) 07/28/2023 (Originally 10/27/2001)    HEPATITIS C SCREENING  11/28/2023 (Originally 7/21/2021)    Pneumococcal Vaccine 65+ (1 - PCV) 11/28/2023 (Originally 10/27/2016)    TDAP/TD VACCINES (1 - Tdap) 11/28/2023 (Originally 10/27/1970)    INFLUENZA VACCINE  08/01/2023    LIPID PANEL  11/22/2023    ANNUAL WELLNESS VISIT  06/08/2024    MAMMOGRAM  09/30/2024    COLORECTAL CANCER SCREENING  11/30/2030                  CMS Preventative Services Quick Reference  Risk Factors Identified During Encounter  None Identified  The above risks/problems have been discussed with the patient.  Pertinent information has been shared with the patient in the After Visit Summary.  An After Visit Summary and PPPS were made available to the patient.    Follow Up:   Next Medicare Wellness visit to be scheduled in 1 year.       Additional E&M Note during same encounter follows:  Patient has multiple medical problems which are significant and separately identifiable that require additional work above and beyond the Medicare Wellness Visit.      Chief Complaint  Medicare Wellness-subsequent    Subjective        She had a screening colonoscopy Oct 2020.     Marisa Uribe is also being seen today for overweight, HTN, HLD and hypothyroidism.     Review of Systems   HENT:  Negative for trouble swallowing.    Eyes:  Negative for visual disturbance.   Respiratory:  Negative for apnea.    Cardiovascular:  Negative for chest pain.   Gastrointestinal:  Negative for blood in stool.   Endocrine: Negative for polyphagia.   Genitourinary:  Negative for dysuria.   Skin:  Negative for color change.   Allergic/Immunologic: Negative for immunocompromised state.   Neurological:  Negative for seizures.   Hematological:  Negative for adenopathy.   Psychiatric/Behavioral:  Negative for behavioral problems.      Objective   Vital Signs:  /81 (BP Location: Left arm, Patient Position: Sitting)   Pulse 58   Temp 97.8 °F  "(36.6 °C)   Resp 18   Ht 160 cm (62.99\")   Wt 75.4 kg (166 lb 4.8 oz)   SpO2 100%   BMI 29.47 kg/m²     Physical Exam  Vitals reviewed.   Constitutional:       Appearance: She is well-developed and overweight.   HENT:      Head: Normocephalic and atraumatic.      Right Ear: External ear normal.      Left Ear: External ear normal.      Mouth/Throat:      Pharynx: No oropharyngeal exudate.   Eyes:      Conjunctiva/sclera: Conjunctivae normal.      Pupils: Pupils are equal, round, and reactive to light.   Neck:      Vascular: No carotid bruit.   Cardiovascular:      Rate and Rhythm: Normal rate and regular rhythm.      Heart sounds: No murmur heard.    No friction rub. No gallop.   Pulmonary:      Effort: Pulmonary effort is normal.      Breath sounds: Normal breath sounds. No wheezing or rhonchi.   Abdominal:      General: There is no distension.   Skin:     General: Skin is warm and dry.   Neurological:      Mental Status: She is alert and oriented to person, place, and time.      Cranial Nerves: No cranial nerve deficit.      Motor: No weakness.   Psychiatric:         Mood and Affect: Mood and affect normal.         Behavior: Behavior normal.         Thought Content: Thought content normal.         Judgment: Judgment normal.          CMP          11/22/2022    08:42   CMP   Glucose 85    BUN 14    Creatinine 0.82    EGFR 76.6    Sodium 141    Potassium 3.6    Chloride 105    Calcium 9.1    Total Protein 6.5    Albumin 4.50    Globulin 2.0    Total Bilirubin 0.5    Alkaline Phosphatase 62    AST (SGOT) 16    ALT (SGPT) 14    Albumin/Globulin Ratio 2.3    BUN/Creatinine Ratio 17.1    Anion Gap 9.0      CBC          11/22/2022    08:42   CBC   WBC 4.82    RBC 4.23    Hemoglobin 12.4    Hematocrit 36.3    MCV 85.8    MCH 29.3    MCHC 34.2    RDW 12.0    Platelets 207      Lipid Panel          11/22/2022    08:42   Lipid Panel   Total Cholesterol 203    Triglycerides 61    HDL Cholesterol 73    VLDL Cholesterol 11  "   LDL Cholesterol  119    LDL/HDL Ratio 1.61      TSH          11/22/2022    08:42 6/1/2023    08:01   TSH   TSH 3.600  1.300                 Assessment and Plan   Diagnoses and all orders for this visit:    1. Medicare annual wellness visit, subsequent (Primary)    2. Postmenopause  -     DEXA Bone Density Axial; Future    3. Essential hypertension  Assessment & Plan:  Hypertension is improving with treatment.  Continue current treatment regimen.  Dietary sodium restriction.  Weight loss.  Blood pressure will be reassessed at the next regular appointment.      4. Overweight with body mass index (BMI) of 29 to 29.9 in adult  Assessment & Plan:  Patient's (Body mass index is 29.47 kg/m².) indicates that they are overweight with health conditions that include hypertension and dyslipidemias . Weight is improving with lifestyle modifications. BMI is is above average; BMI management plan is completed. We discussed low calorie, low carb based diet program, portion control, and increasing exercise.       5. Mixed hyperlipidemia  Assessment & Plan:  Lipid abnormalities are improving with lifestyle modifications.  Nutritional counseling was provided.  Lipids will be reassessed in 6 months.    Orders:  -     Lipid Panel; Future    6. Acquired hypothyroidism  Assessment & Plan:  Her most recent thyroid level was elevated 7 days ago. Her thyroid hormone was decreased and we will recheck in 6 months.     Orders:  -     TSH+Free T4; Future    7. Annual physical exam  -     Comprehensive Metabolic Panel; Future  -     CBC & Differential; Future    8. Encounter for screening mammogram for malignant neoplasm of breast  -     Mammo Screening Digital Tomosynthesis Bilateral With CAD; Future    9. Vitamin D deficiency  -     Vitamin D 25 hydroxy; Future             Follow Up   Return in about 6 months (around 12/8/2023).  Patient was given instructions and counseling regarding her condition or for health maintenance advice. Please see  specific information pulled into the AVS if appropriate.

## 2023-06-08 NOTE — ASSESSMENT & PLAN NOTE
Patient's (Body mass index is 29.47 kg/m².) indicates that they are overweight with health conditions that include hypertension and dyslipidemias . Weight is improving with lifestyle modifications. BMI is is above average; BMI management plan is completed. We discussed low calorie, low carb based diet program, portion control, and increasing exercise.

## 2023-06-08 NOTE — PATIENT INSTRUCTIONS
Advance Care Planning and Advance Directives     You make decisions on a daily basis - decisions about where you want to live, your career, your home, your life. Perhaps one of the most important decisions you face is your choice for future medical care. Take time to talk with your family and your healthcare team and start planning today.  Advance Care Planning is a process that can help you:  Understand possible future healthcare decisions in light of your own experiences  Reflect on those decision in light of your goals and values  Discuss your decisions with those closest to you and the healthcare professionals that care for you  Make a plan by creating a document that reflects your wishes    Surrogate Decision Maker  In the event of a medical emergency, which has left you unable to communicate or to make your own decisions, you would need someone to make decisions for you.  It is important to discuss your preferences for medical treatment with this person while you are in good health.     Qualities of a surrogate decision maker:  Willing to take on this role and responsibility  Knows what you want for future medical care  Willing to follow your wishes even if they don't agree with them  Able to make difficult medical decisions under stressful circumstances    Advance Directives  These are legal documents you can create that will guide your healthcare team and decision maker(s) when needed. These documents can be stored in the electronic medical record.    Living Will - a legal document to guide your care if you have a terminal condition or a serious illness and are unable to communicate. States vary by statute in document names/types, but most forms may include one or more of the following:        -  Directions regarding life-prolonging treatments        -  Directions regarding artificially provided nutrition/hydration        -  Choosing a healthcare decision maker        -  Direction regarding organ/tissue  donation    Durable Power of  for Healthcare - this document names an -in-fact to make medical decisions for you, but it may also allow this person to make personal and financial decisions for you. Please seek the advice of an  if you need this type of document.    **Advance Directives are not required and no one may discriminate against you if you do not sign one.    Medical Orders  Many states allow specific forms/orders signed by your physician to record your wishes for medical treatment in your current state of health. This form, signed in personal communication with your physician, addresses resuscitation and other medical interventions that you may or may not want.      For more information or to schedule a time with a [facility] Advance Care Planning Facilitator contact: [email or web address] or call [phone] and someone will contact you directly.

## 2023-10-30 RX ORDER — LOSARTAN POTASSIUM AND HYDROCHLOROTHIAZIDE 25; 100 MG/1; MG/1
1 TABLET ORAL DAILY
Qty: 90 TABLET | Refills: 1 | Status: SHIPPED | OUTPATIENT
Start: 2023-10-30

## 2023-11-29 RX ORDER — LEVOTHYROXINE SODIUM 112 UG/1
112 TABLET ORAL DAILY
Qty: 90 TABLET | Refills: 0 | Status: SHIPPED | OUTPATIENT
Start: 2023-11-29

## 2023-12-06 ENCOUNTER — LAB (OUTPATIENT)
Dept: LAB | Facility: HOSPITAL | Age: 73
End: 2023-12-06
Payer: MEDICARE

## 2023-12-06 DIAGNOSIS — E55.9 VITAMIN D DEFICIENCY: ICD-10-CM

## 2023-12-06 DIAGNOSIS — E78.2 MIXED HYPERLIPIDEMIA: Chronic | ICD-10-CM

## 2023-12-06 DIAGNOSIS — E03.9 ACQUIRED HYPOTHYROIDISM: Chronic | ICD-10-CM

## 2023-12-06 DIAGNOSIS — Z00.00 ANNUAL PHYSICAL EXAM: ICD-10-CM

## 2023-12-06 LAB
25(OH)D3 SERPL-MCNC: 57.9 NG/ML (ref 30–100)
ALBUMIN SERPL-MCNC: 4.5 G/DL (ref 3.5–5.2)
ALBUMIN/GLOB SERPL: 2.1 G/DL
ALP SERPL-CCNC: 77 U/L (ref 39–117)
ALT SERPL W P-5'-P-CCNC: 31 U/L (ref 1–33)
ANION GAP SERPL CALCULATED.3IONS-SCNC: 9 MMOL/L (ref 5–15)
AST SERPL-CCNC: 21 U/L (ref 1–32)
BASOPHILS # BLD AUTO: 0.04 10*3/MM3 (ref 0–0.2)
BASOPHILS NFR BLD AUTO: 0.8 % (ref 0–1.5)
BILIRUB SERPL-MCNC: 0.4 MG/DL (ref 0–1.2)
BUN SERPL-MCNC: 15 MG/DL (ref 8–23)
BUN/CREAT SERPL: 16.1 (ref 7–25)
CALCIUM SPEC-SCNC: 9.4 MG/DL (ref 8.6–10.5)
CHLORIDE SERPL-SCNC: 106 MMOL/L (ref 98–107)
CHOLEST SERPL-MCNC: 184 MG/DL (ref 0–200)
CO2 SERPL-SCNC: 28 MMOL/L (ref 22–29)
CREAT SERPL-MCNC: 0.93 MG/DL (ref 0.57–1)
DEPRECATED RDW RBC AUTO: 35.3 FL (ref 37–54)
EGFRCR SERPLBLD CKD-EPI 2021: 65.4 ML/MIN/1.73
EOSINOPHIL # BLD AUTO: 0.25 10*3/MM3 (ref 0–0.4)
EOSINOPHIL NFR BLD AUTO: 4.7 % (ref 0.3–6.2)
ERYTHROCYTE [DISTWIDTH] IN BLOOD BY AUTOMATED COUNT: 11.8 % (ref 12.3–15.4)
GLOBULIN UR ELPH-MCNC: 2.1 GM/DL
GLUCOSE SERPL-MCNC: 83 MG/DL (ref 65–99)
HCT VFR BLD AUTO: 39.2 % (ref 34–46.6)
HDLC SERPL-MCNC: 73 MG/DL (ref 40–60)
HGB BLD-MCNC: 12.9 G/DL (ref 12–15.9)
IMM GRANULOCYTES # BLD AUTO: 0.02 10*3/MM3 (ref 0–0.05)
IMM GRANULOCYTES NFR BLD AUTO: 0.4 % (ref 0–0.5)
LDLC SERPL CALC-MCNC: 99 MG/DL (ref 0–100)
LDLC/HDLC SERPL: 1.35 {RATIO}
LYMPHOCYTES # BLD AUTO: 1.41 10*3/MM3 (ref 0.7–3.1)
LYMPHOCYTES NFR BLD AUTO: 26.5 % (ref 19.6–45.3)
MCH RBC QN AUTO: 27.7 PG (ref 26.6–33)
MCHC RBC AUTO-ENTMCNC: 32.9 G/DL (ref 31.5–35.7)
MCV RBC AUTO: 84.1 FL (ref 79–97)
MONOCYTES # BLD AUTO: 0.42 10*3/MM3 (ref 0.1–0.9)
MONOCYTES NFR BLD AUTO: 7.9 % (ref 5–12)
NEUTROPHILS NFR BLD AUTO: 3.18 10*3/MM3 (ref 1.7–7)
NEUTROPHILS NFR BLD AUTO: 59.7 % (ref 42.7–76)
NRBC BLD AUTO-RTO: 0 /100 WBC (ref 0–0.2)
PLATELET # BLD AUTO: 226 10*3/MM3 (ref 140–450)
PMV BLD AUTO: 9.4 FL (ref 6–12)
POTASSIUM SERPL-SCNC: 3.5 MMOL/L (ref 3.5–5.2)
PROT SERPL-MCNC: 6.6 G/DL (ref 6–8.5)
RBC # BLD AUTO: 4.66 10*6/MM3 (ref 3.77–5.28)
SODIUM SERPL-SCNC: 143 MMOL/L (ref 136–145)
T4 FREE SERPL-MCNC: 1.4 NG/DL (ref 0.93–1.7)
TRIGL SERPL-MCNC: 62 MG/DL (ref 0–150)
TSH SERPL DL<=0.05 MIU/L-ACNC: 11.2 UIU/ML (ref 0.27–4.2)
VLDLC SERPL-MCNC: 12 MG/DL (ref 5–40)
WBC NRBC COR # BLD AUTO: 5.32 10*3/MM3 (ref 3.4–10.8)

## 2023-12-06 PROCEDURE — 84443 ASSAY THYROID STIM HORMONE: CPT

## 2023-12-06 PROCEDURE — 85025 COMPLETE CBC W/AUTO DIFF WBC: CPT

## 2023-12-06 PROCEDURE — 80061 LIPID PANEL: CPT

## 2023-12-06 PROCEDURE — 84439 ASSAY OF FREE THYROXINE: CPT

## 2023-12-06 PROCEDURE — 80053 COMPREHEN METABOLIC PANEL: CPT

## 2023-12-06 PROCEDURE — 36415 COLL VENOUS BLD VENIPUNCTURE: CPT

## 2023-12-06 PROCEDURE — 82306 VITAMIN D 25 HYDROXY: CPT

## 2023-12-14 ENCOUNTER — HOSPITAL ENCOUNTER (OUTPATIENT)
Dept: GENERAL RADIOLOGY | Facility: HOSPITAL | Age: 73
Discharge: HOME | End: 2023-12-14
Admitting: FAMILY MEDICINE
Payer: MEDICARE

## 2023-12-14 ENCOUNTER — OFFICE VISIT (OUTPATIENT)
Dept: FAMILY MEDICINE CLINIC | Facility: CLINIC | Age: 73
End: 2023-12-14
Payer: MEDICARE

## 2023-12-14 VITALS
SYSTOLIC BLOOD PRESSURE: 120 MMHG | BODY MASS INDEX: 29.45 KG/M2 | DIASTOLIC BLOOD PRESSURE: 71 MMHG | RESPIRATION RATE: 18 BRPM | OXYGEN SATURATION: 98 % | TEMPERATURE: 98.2 F | HEIGHT: 63 IN | HEART RATE: 57 BPM | WEIGHT: 166.2 LBS

## 2023-12-14 DIAGNOSIS — R05.3 CHRONIC COUGH: ICD-10-CM

## 2023-12-14 DIAGNOSIS — Z11.59 NEED FOR HEPATITIS C SCREENING TEST: ICD-10-CM

## 2023-12-14 DIAGNOSIS — M15.9 PRIMARY OSTEOARTHRITIS INVOLVING MULTIPLE JOINTS: ICD-10-CM

## 2023-12-14 DIAGNOSIS — E78.2 MIXED HYPERLIPIDEMIA: ICD-10-CM

## 2023-12-14 DIAGNOSIS — E03.9 ACQUIRED HYPOTHYROIDISM: ICD-10-CM

## 2023-12-14 DIAGNOSIS — I10 ESSENTIAL HYPERTENSION: Primary | ICD-10-CM

## 2023-12-14 PROBLEM — Z00.00 MEDICARE ANNUAL WELLNESS VISIT, SUBSEQUENT: Status: RESOLVED | Noted: 2022-05-12 | Resolved: 2023-12-14

## 2023-12-14 PROBLEM — M15.0 PRIMARY OSTEOARTHRITIS INVOLVING MULTIPLE JOINTS: Status: ACTIVE | Noted: 2023-12-14

## 2023-12-14 PROBLEM — M15.0 PRIMARY OSTEOARTHRITIS INVOLVING MULTIPLE JOINTS: Chronic | Status: ACTIVE | Noted: 2023-12-14

## 2023-12-14 PROCEDURE — 1159F MED LIST DOCD IN RCRD: CPT | Performed by: FAMILY MEDICINE

## 2023-12-14 PROCEDURE — 3074F SYST BP LT 130 MM HG: CPT | Performed by: FAMILY MEDICINE

## 2023-12-14 PROCEDURE — 1160F RVW MEDS BY RX/DR IN RCRD: CPT | Performed by: FAMILY MEDICINE

## 2023-12-14 PROCEDURE — 3078F DIAST BP <80 MM HG: CPT | Performed by: FAMILY MEDICINE

## 2023-12-14 PROCEDURE — 71046 X-RAY EXAM CHEST 2 VIEWS: CPT

## 2023-12-14 RX ORDER — AMLODIPINE BESYLATE 10 MG/1
10 TABLET ORAL DAILY
Qty: 90 TABLET | Refills: 1 | Status: SHIPPED | OUTPATIENT
Start: 2023-12-14

## 2023-12-14 NOTE — ASSESSMENT & PLAN NOTE
The patient's currently doing well on 112 mcg of Synthroid daily.  Most recent thyroid levels normal.  Will continue on this dosage and follow back up in 6 months and recheck at that time manage according findings.

## 2023-12-14 NOTE — PROGRESS NOTES
"Chief Complaint  Hypertension, Hyperlipidemia, and Cough    Subjective        Marisa Uribe presents to [facility]  History of Present Illness  She is here today for management of her chronic medical conditions.  She has hypertension, GERD, hypothyroidism.  She is a previous smoker and smoked for 16 years.  Smoked approximately 2 packs a day.  She occasionally uses alcohol.  She has family history of coronary artery disease and lung cancer.     She has a cyst on the back of her left had.    She is having left hip and knee pain.      The patient has no other complaints today and denies chest pain, shortness of breath, weakness, numbness, nausea, vomiting, diarrhea, dizziness or syncopal event.        Objective   Vital Signs:  /71 (BP Location: Left arm, Patient Position: Sitting, Cuff Size: Adult)   Pulse 57   Temp 98.2 °F (36.8 °C) (Tympanic)   Resp 18   Ht 160 cm (62.99\")   Wt 75.4 kg (166 lb 3.2 oz)   SpO2 98%   BMI 29.45 kg/m²   Estimated body mass index is 29.45 kg/m² as calculated from the following:    Height as of this encounter: 160 cm (62.99\").    Weight as of this encounter: 75.4 kg (166 lb 3.2 oz).               Physical Exam  Vitals reviewed.   Constitutional:       Appearance: She is well-developed and overweight.   HENT:      Head: Normocephalic and atraumatic.      Right Ear: External ear normal.      Left Ear: External ear normal.      Mouth/Throat:      Pharynx: No oropharyngeal exudate.   Eyes:      Conjunctiva/sclera: Conjunctivae normal.      Pupils: Pupils are equal, round, and reactive to light.   Neck:      Vascular: No carotid bruit.   Cardiovascular:      Rate and Rhythm: Normal rate and regular rhythm.      Heart sounds: No murmur heard.     No friction rub. No gallop.   Pulmonary:      Effort: Pulmonary effort is normal.      Breath sounds: Normal breath sounds. No wheezing or rhonchi.   Abdominal:      General: There is no distension. "   Skin:     General: Skin is warm and dry.   Neurological:      Mental Status: She is alert and oriented to person, place, and time.      Cranial Nerves: No cranial nerve deficit.      Motor: No weakness.   Psychiatric:         Mood and Affect: Mood and affect normal.         Behavior: Behavior normal.         Thought Content: Thought content normal.         Judgment: Judgment normal.        Result Review :    CMP          12/6/2023    08:28   CMP   Glucose 83    BUN 15    Creatinine 0.93    EGFR 65.4    Sodium 143    Potassium 3.5    Chloride 106    Calcium 9.4    Total Protein 6.6    Albumin 4.5    Globulin 2.1    Total Bilirubin 0.4    Alkaline Phosphatase 77    AST (SGOT) 21    ALT (SGPT) 31    Albumin/Globulin Ratio 2.1    BUN/Creatinine Ratio 16.1    Anion Gap 9.0      CBC          12/6/2023    08:28   CBC   WBC 5.32    RBC 4.66    Hemoglobin 12.9    Hematocrit 39.2    MCV 84.1    MCH 27.7    MCHC 32.9    RDW 11.8    Platelets 226      Lipid Panel          12/6/2023    08:28   Lipid Panel   Total Cholesterol 184    Triglycerides 62    HDL Cholesterol 73    VLDL Cholesterol 12    LDL Cholesterol  99    LDL/HDL Ratio 1.35      TSH          6/1/2023    08:01 12/6/2023    08:28   TSH   TSH 1.300  11.200                   Assessment and Plan   Diagnoses and all orders for this visit:    1. Essential hypertension (Primary)  Assessment & Plan:  Hypertension is improving with treatment.  Continue current treatment regimen.  Dietary sodium restriction.  Weight loss.  Blood pressure will be reassessed at the next regular appointment.    Orders:  -     amLODIPine (NORVASC) 10 MG tablet; Take 1 tablet by mouth Daily.  Dispense: 90 tablet; Refill: 1    2. Mixed hyperlipidemia  Assessment & Plan:  Lipid abnormalities are improving with lifestyle modifications.  Nutritional counseling was provided.  Lipids will be reassessed in 1 year.      3. Acquired hypothyroidism  Assessment & Plan:  The patient's currently doing well on  112 mcg of Synthroid daily.  Most recent thyroid levels normal.  Will continue on this dosage and follow back up in 6 months and recheck at that time manage according findings.    Orders:  -     TSH+Free T4; Future    4. Need for hepatitis C screening test  -     Hepatitis C antibody; Future    5. Primary osteoarthritis involving multiple joints  -     diclofenac (VOLTAREN) 50 MG EC tablet; Take 1 tablet by mouth 2 (Two) Times a Day.  Dispense: 60 tablet; Refill: 5             Follow Up   Return in about 6 months (around 6/14/2024).  Patient was given instructions and counseling regarding her condition or for health maintenance advice. Please see specific information pulled into the AVS if appropriate.

## 2024-01-15 ENCOUNTER — HOSPITAL ENCOUNTER (OUTPATIENT)
Dept: BONE DENSITY | Facility: HOSPITAL | Age: 74
Discharge: HOME | End: 2024-01-15
Payer: MEDICARE

## 2024-01-15 ENCOUNTER — HOSPITAL ENCOUNTER (OUTPATIENT)
Dept: MAMMOGRAPHY | Facility: HOSPITAL | Age: 74
Discharge: HOME | End: 2024-01-15
Payer: MEDICARE

## 2024-01-15 DIAGNOSIS — Z78.0 POSTMENOPAUSE: ICD-10-CM

## 2024-01-15 DIAGNOSIS — Z12.31 ENCOUNTER FOR SCREENING MAMMOGRAM FOR MALIGNANT NEOPLASM OF BREAST: ICD-10-CM

## 2024-01-15 PROCEDURE — 77067 SCR MAMMO BI INCL CAD: CPT

## 2024-01-15 PROCEDURE — 77063 BREAST TOMOSYNTHESIS BI: CPT

## 2024-01-15 PROCEDURE — 77080 DXA BONE DENSITY AXIAL: CPT

## 2024-02-26 RX ORDER — LEVOTHYROXINE SODIUM 112 UG/1
112 TABLET ORAL DAILY
Qty: 90 TABLET | Refills: 0 | Status: SHIPPED | OUTPATIENT
Start: 2024-02-26

## 2024-03-08 ENCOUNTER — OFFICE VISIT (OUTPATIENT)
Dept: FAMILY MEDICINE CLINIC | Facility: CLINIC | Age: 74
End: 2024-03-08
Payer: MEDICARE

## 2024-03-08 VITALS
HEART RATE: 71 BPM | DIASTOLIC BLOOD PRESSURE: 64 MMHG | BODY MASS INDEX: 30.02 KG/M2 | OXYGEN SATURATION: 100 % | SYSTOLIC BLOOD PRESSURE: 126 MMHG | HEIGHT: 63 IN | WEIGHT: 169.4 LBS | TEMPERATURE: 98.7 F | RESPIRATION RATE: 18 BRPM

## 2024-03-08 DIAGNOSIS — M25.562 CHRONIC PAIN OF BOTH KNEES: Primary | ICD-10-CM

## 2024-03-08 DIAGNOSIS — M25.551 BILATERAL HIP PAIN: ICD-10-CM

## 2024-03-08 DIAGNOSIS — I10 ESSENTIAL HYPERTENSION: Chronic | ICD-10-CM

## 2024-03-08 DIAGNOSIS — M25.552 BILATERAL HIP PAIN: ICD-10-CM

## 2024-03-08 DIAGNOSIS — M25.561 CHRONIC PAIN OF BOTH KNEES: Primary | ICD-10-CM

## 2024-03-08 DIAGNOSIS — G89.29 CHRONIC PAIN OF BOTH KNEES: Primary | ICD-10-CM

## 2024-03-08 DIAGNOSIS — E66.09 CLASS 1 OBESITY DUE TO EXCESS CALORIES WITH SERIOUS COMORBIDITY AND BODY MASS INDEX (BMI) OF 30.0 TO 30.9 IN ADULT: ICD-10-CM

## 2024-03-08 PROBLEM — E66.811 CLASS 1 OBESITY DUE TO EXCESS CALORIES WITH SERIOUS COMORBIDITY AND BODY MASS INDEX (BMI) OF 30.0 TO 30.9 IN ADULT: Chronic | Status: ACTIVE | Noted: 2024-03-08

## 2024-03-08 PROBLEM — E66.811 CLASS 1 OBESITY DUE TO EXCESS CALORIES WITH SERIOUS COMORBIDITY AND BODY MASS INDEX (BMI) OF 30.0 TO 30.9 IN ADULT: Status: ACTIVE | Noted: 2024-03-08

## 2024-03-08 PROBLEM — E66.3 OVERWEIGHT WITH BODY MASS INDEX (BMI) OF 29 TO 29.9 IN ADULT: Chronic | Status: RESOLVED | Noted: 2021-11-03 | Resolved: 2024-03-08

## 2024-03-08 PROBLEM — M15.9 PRIMARY OSTEOARTHRITIS INVOLVING MULTIPLE JOINTS: Chronic | Status: RESOLVED | Noted: 2023-12-14 | Resolved: 2024-03-08

## 2024-03-08 PROBLEM — M15.0 PRIMARY OSTEOARTHRITIS INVOLVING MULTIPLE JOINTS: Chronic | Status: RESOLVED | Noted: 2023-12-14 | Resolved: 2024-03-08

## 2024-03-08 RX ORDER — TRIAMCINOLONE ACETONIDE 40 MG/ML
60 INJECTION, SUSPENSION INTRA-ARTICULAR; INTRAMUSCULAR
Status: COMPLETED | OUTPATIENT
Start: 2024-03-08 | End: 2024-03-08

## 2024-03-08 RX ADMIN — TRIAMCINOLONE ACETONIDE 60 MG: 40 INJECTION, SUSPENSION INTRA-ARTICULAR; INTRAMUSCULAR at 15:25

## 2024-03-08 NOTE — PROGRESS NOTES
"Chief Complaint  Hip Pain (Bilateral pain) and Knee Pain    Subjective        Marisa Uribe presents to [facility]  History of Present Illness  She is here today for management of her chronic medical conditions.  She has hypertension, GERD, hypothyroidism.  She is a previous smoker and smoked for 16 years.  Smoked approximately 2 packs a day.  She occasionally uses alcohol.  She has family history of coronary artery disease and lung cancer.     She has a cyst on the back of her left had.     She is having left hip and knee pain.      The patient has no other complaints today and denies chest pain, shortness of breath, weakness, numbness, nausea, vomiting, diarrhea, dizziness or syncopal event.        Objective   Vital Signs:  /64 (BP Location: Left arm, Patient Position: Sitting, Cuff Size: Adult)   Pulse 71   Temp 98.7 °F (37.1 °C) (Tympanic)   Resp 18   Ht 160 cm (62.99\")   Wt 76.8 kg (169 lb 6.4 oz)   SpO2 100%   BMI 30.02 kg/m²   Estimated body mass index is 30.02 kg/m² as calculated from the following:    Height as of this encounter: 160 cm (62.99\").    Weight as of this encounter: 76.8 kg (169 lb 6.4 oz).               Physical Exam  Vitals reviewed.   Constitutional:       Appearance: She is well-developed. She is obese.   HENT:      Head: Normocephalic and atraumatic.      Right Ear: External ear normal.      Left Ear: External ear normal.      Mouth/Throat:      Pharynx: No oropharyngeal exudate.   Eyes:      Conjunctiva/sclera: Conjunctivae normal.      Pupils: Pupils are equal, round, and reactive to light.   Neck:      Vascular: No carotid bruit.   Cardiovascular:      Rate and Rhythm: Normal rate and regular rhythm.      Heart sounds: No murmur heard.     No friction rub. No gallop.   Pulmonary:      Effort: Pulmonary effort is normal.      Breath sounds: Normal breath sounds. No wheezing or rhonchi.   Abdominal:      General: There is no distension. "   Skin:     General: Skin is warm and dry.   Neurological:      Mental Status: She is alert and oriented to person, place, and time.      Cranial Nerves: No cranial nerve deficit.      Motor: No weakness.   Psychiatric:         Mood and Affect: Mood and affect normal.         Behavior: Behavior normal.         Thought Content: Thought content normal.         Judgment: Judgment normal.        Result Review :      CMP          12/6/2023    08:28   CMP   Glucose 83    BUN 15    Creatinine 0.93    EGFR 65.4    Sodium 143    Potassium 3.5    Chloride 106    Calcium 9.4    Total Protein 6.6    Albumin 4.5    Globulin 2.1    Total Bilirubin 0.4    Alkaline Phosphatase 77    AST (SGOT) 21    ALT (SGPT) 31    Albumin/Globulin Ratio 2.1    BUN/Creatinine Ratio 16.1    Anion Gap 9.0      CBC          12/6/2023    08:28   CBC   WBC 5.32    RBC 4.66    Hemoglobin 12.9    Hematocrit 39.2    MCV 84.1    MCH 27.7    MCHC 32.9    RDW 11.8    Platelets 226      Lipid Panel          12/6/2023    08:28   Lipid Panel   Total Cholesterol 184    Triglycerides 62    HDL Cholesterol 73    VLDL Cholesterol 12    LDL Cholesterol  99    LDL/HDL Ratio 1.35      TSH          6/1/2023    08:01 12/6/2023    08:28   TSH   TSH 1.300  11.200            - Injection Tendon or Ligament    Date/Time: 3/8/2024 3:25 PM    Performed by: Amy Goel DO  Authorized by: Amy Goel DO  Local anesthesia used: yes    Anesthesia:  Local anesthesia used: yes  Local Anesthetic: lidocaine spray    Sedation:  Patient sedated: no    Medications administered: 60 mg triamcinolone acetonide 40 MG/ML            Assessment and Plan     Diagnoses and all orders for this visit:    1. Chronic pain of both knees (Primary)  Comments:  Pt was explained the risk  benefits of steroid injection therapy.  A 1-1/2 inch 25-gauge needle was used to inject 60 mg of Kenalog and 1/2 cc of 2% lidocaine  Orders:  -     Ambulatory Referral to Orthopedic Surgery    2. Essential  hypertension  Assessment & Plan:  Hypertension is stable and controlled  Continue current treatment regimen.  Blood pressure will be reassessed in 6 months.      3. Class 1 obesity due to excess calories with serious comorbidity and body mass index (BMI) of 30.0 to 30.9 in adult  Assessment & Plan:  Patient's (Body mass index is 30.02 kg/m².) indicates that they are obese (BMI >30) with health conditions that include hypertension and dyslipidemias . Weight is improving with treatment. BMI  is above average; BMI management plan is completed. We discussed low calorie, low carb based diet program, portion control, and increasing exercise.       4. Bilateral hip pain  -     Ambulatory Referral to Orthopedic Surgery    Other orders  -     Cancel: Inject Trigger Points, > 3  -     - Injection Tendon or Ligament             Follow Up     Return if symptoms worsen or fail to improve.  Patient was given instructions and counseling regarding her condition or for health maintenance advice. Please see specific information pulled into the AVS if appropriate.

## 2024-03-08 NOTE — ASSESSMENT & PLAN NOTE
Patient's (Body mass index is 30.02 kg/m².) indicates that they are obese (BMI >30) with health conditions that include hypertension and dyslipidemias . Weight is improving with treatment. BMI  is above average; BMI management plan is completed. We discussed low calorie, low carb based diet program, portion control, and increasing exercise.

## 2024-03-15 ENCOUNTER — PREP FOR SURGERY (OUTPATIENT)
Dept: OTHER | Facility: HOSPITAL | Age: 74
End: 2024-03-15
Payer: MEDICARE

## 2024-03-15 ENCOUNTER — OFFICE VISIT (OUTPATIENT)
Dept: ORTHOPEDIC SURGERY | Facility: CLINIC | Age: 74
End: 2024-03-15
Payer: MEDICARE

## 2024-03-15 VITALS
BODY MASS INDEX: 29.95 KG/M2 | WEIGHT: 169 LBS | HEART RATE: 60 BPM | OXYGEN SATURATION: 97 % | HEIGHT: 63 IN | DIASTOLIC BLOOD PRESSURE: 82 MMHG | SYSTOLIC BLOOD PRESSURE: 138 MMHG

## 2024-03-15 DIAGNOSIS — M16.12 PRIMARY OSTEOARTHRITIS OF LEFT HIP: ICD-10-CM

## 2024-03-15 DIAGNOSIS — M16.12 PRIMARY OSTEOARTHRITIS OF LEFT HIP: Primary | ICD-10-CM

## 2024-03-15 DIAGNOSIS — M17.12 PRIMARY OSTEOARTHRITIS OF LEFT KNEE: ICD-10-CM

## 2024-03-15 DIAGNOSIS — M25.562 LEFT KNEE PAIN, UNSPECIFIED CHRONICITY: ICD-10-CM

## 2024-03-15 DIAGNOSIS — M25.552 LEFT HIP PAIN: Primary | ICD-10-CM

## 2024-03-15 RX ORDER — TRANEXAMIC ACID 10 MG/ML
1000 INJECTION, SOLUTION INTRAVENOUS ONCE
OUTPATIENT
Start: 2024-03-15 | End: 2024-03-15

## 2024-03-15 RX ORDER — CEFAZOLIN SODIUM 2 G/100ML
2 INJECTION, SOLUTION INTRAVENOUS ONCE
OUTPATIENT
Start: 2024-03-15 | End: 2024-03-15

## 2024-03-15 NOTE — PROGRESS NOTES
"Chief Complaint  Pain and Initial Evaluation of the Left Knee and Pain and Initial Evaluation of the Left Hip     Subjective      Marisa Uribe presents to [facility] for evaluation of the left lower extremity. The patient reports left knee and left hip pain for a couple years. She reports it is progressively gotten worse. She reports it keeps her up at night. She has used topicals without lasting relief. She reports pain with transitioning from to sit to stand.     No Known Allergies     Social History     Socioeconomic History    Marital status:    Tobacco Use    Smoking status: Former     Current packs/day: 0.00     Average packs/day: 2.0 packs/day for 15.0 years (30.0 ttl pk-yrs)     Types: Cigarettes     Start date: 1967     Quit date: 1982     Years since quittin.2    Smokeless tobacco: Never    Tobacco comments:     no second hand smoke exposure   Vaping Use    Vaping status: Never Used   Substance and Sexual Activity    Alcohol use: Yes     Alcohol/week: 1.0 standard drink of alcohol     Types: 1 Glasses of wine per week     Comment: rare occasion    Drug use: Never    Sexual activity: Defer        I reviewed the patient's chief complaint, history of present illness, review of systems, past medical history, surgical history, family history, social history, medications, and allergy list.     Review of Systems     Constitutional: Denies fevers, chills, weight loss  Cardiovascular: Denies chest pain, shortness of breath  Skin: Denies rashes, acute skin changes  Neurologic: Denies headache, loss of consciousness  MSK: left lower extremity pain      Vital Signs:   /82   Pulse 60   Ht 160 cm (63\")   Wt 76.7 kg (169 lb)   SpO2 97%   BMI 29.94 kg/m²          Physical Exam  General: Alert. No acute distress    Ortho Exam      Left lower extremity- hip flexion 80, External Rotation 20, internal rotation 5. Knee Extensor Mechanism  intact. Knee ROM 0-125 " degrees. Stable to varus/valgus stress. Stable to anterior/posterior drawer. Tender to the medial and lateral knee. Tender to the lateral hip.     Procedures    X-Ray Report:  Left knee X-Ray  Indication: Evaluation of left knee pain  AP/Lateral, Standing, and Renfrow view(s)  Findings: no acute fracture. Moderate degenerative changes with mild medial joint space narrowing. No effusion.   Prior studies available for comparison: no     X-Ray Report:  Left hip X-Ray  Indication: Evaluation of left hip pain  AP/Lateral view(s)  Findings: advanced degenerative changes, bone on bone articulation. No acute fracture.   Prior studies available for comparison: no       Imaging Results (Most Recent)       Procedure Component Value Units Date/Time    XR Hip With or Without Pelvis 2 - 3 View Left [[id number]] Resulted: 03/15/24 0934     Updated: 03/15/24 0936    XR Knee 3 View Left [[id number]] Resulted: 03/15/24 0934     Updated: 03/15/24 0936             Result Review :       No results found.           Assessment and Plan     Diagnoses and all orders for this visit:    1. Left hip pain (Primary)  -     XR Hip With or Without Pelvis 2 - 3 View Left    2. Left knee pain, unspecified chronicity  -     XR Knee 3 View Left    3. Primary osteoarthritis of left hip    4. Primary osteoarthritis of left knee        Discussed the treatment options with the patient, operative vs non-operative. I reviewed the x-rays that were obtained today with the patient. Discussed the risks and benefits of a Left Total Hip Arthroplasty vs conservative treatment with injection by radiology . The patient expressed understanding and wished to proceed with operative treatment.     Discussed surgery., Risks/benefits discussed with patient including, but not limited to: infection, bleeding, neurovascular damage, malunion, nonunion, aesthetic deformity, need for further surgery, and death., Discussed with patient the implant type being used during surgery  and patient understands., Surgery pamphlet given., Call or return if worsening symptoms., and DME order for a 3 in 1 given today due to patient will be confined to one room/level of the home that does not offer a toilet during postop recovery.     Follow Up     2 weeks postoperatively.        Patient was given instructions and counseling regarding her condition or for health maintenance advice. Please see specific information pulled into the AVS if appropriate.     Scribed for Bg Douglass MD by Zakia Shahid.  03/15/24   09:37 EDT    I have personally performed the services described in this document as scribed by the above individual and it is both accurate and complete. Bg Douglass MD 03/16/24

## 2024-03-21 DIAGNOSIS — Z47.1 AFTERCARE FOLLOWING LEFT HIP JOINT REPLACEMENT SURGERY: Primary | ICD-10-CM

## 2024-03-21 DIAGNOSIS — Z96.642 AFTERCARE FOLLOWING LEFT HIP JOINT REPLACEMENT SURGERY: Primary | ICD-10-CM

## 2024-03-26 ENCOUNTER — LAB (OUTPATIENT)
Dept: LAB | Facility: HOSPITAL | Age: 74
End: 2024-03-26
Payer: MEDICARE

## 2024-03-26 DIAGNOSIS — Z11.59 NEED FOR HEPATITIS C SCREENING TEST: ICD-10-CM

## 2024-03-26 DIAGNOSIS — M16.12 PRIMARY OSTEOARTHRITIS OF LEFT HIP: ICD-10-CM

## 2024-03-26 DIAGNOSIS — E03.9 ACQUIRED HYPOTHYROIDISM: ICD-10-CM

## 2024-03-26 LAB
ALBUMIN SERPL-MCNC: 4.5 G/DL (ref 3.5–5.2)
ALBUMIN/GLOB SERPL: 2.1 G/DL
ALP SERPL-CCNC: 66 U/L (ref 39–117)
ALT SERPL W P-5'-P-CCNC: 19 U/L (ref 1–33)
ANION GAP SERPL CALCULATED.3IONS-SCNC: 11.2 MMOL/L (ref 5–15)
AST SERPL-CCNC: 18 U/L (ref 1–32)
BILIRUB SERPL-MCNC: 0.5 MG/DL (ref 0–1.2)
BUN SERPL-MCNC: 24 MG/DL (ref 8–23)
BUN/CREAT SERPL: 25.3 (ref 7–25)
CALCIUM SPEC-SCNC: 9.1 MG/DL (ref 8.6–10.5)
CHLORIDE SERPL-SCNC: 103 MMOL/L (ref 98–107)
CO2 SERPL-SCNC: 25.8 MMOL/L (ref 22–29)
CREAT SERPL-MCNC: 0.95 MG/DL (ref 0.57–1)
EGFRCR SERPLBLD CKD-EPI 2021: 63.8 ML/MIN/1.73
GLOBULIN UR ELPH-MCNC: 2.1 GM/DL
GLUCOSE SERPL-MCNC: 79 MG/DL (ref 65–99)
HCV AB SER DONR QL: NORMAL
POTASSIUM SERPL-SCNC: 3.8 MMOL/L (ref 3.5–5.2)
PROT SERPL-MCNC: 6.6 G/DL (ref 6–8.5)
SODIUM SERPL-SCNC: 140 MMOL/L (ref 136–145)
T4 FREE SERPL-MCNC: 1.75 NG/DL (ref 0.93–1.7)
TSH SERPL DL<=0.05 MIU/L-ACNC: 8.06 UIU/ML (ref 0.27–4.2)

## 2024-03-26 PROCEDURE — 86803 HEPATITIS C AB TEST: CPT

## 2024-03-26 PROCEDURE — 36415 COLL VENOUS BLD VENIPUNCTURE: CPT

## 2024-03-26 PROCEDURE — 80053 COMPREHEN METABOLIC PANEL: CPT

## 2024-03-26 PROCEDURE — 84439 ASSAY OF FREE THYROXINE: CPT

## 2024-03-26 PROCEDURE — 84443 ASSAY THYROID STIM HORMONE: CPT

## 2024-03-28 ENCOUNTER — TELEPHONE (OUTPATIENT)
Dept: FAMILY MEDICINE CLINIC | Facility: CLINIC | Age: 74
End: 2024-03-28
Payer: MEDICARE

## 2024-03-28 NOTE — TELEPHONE ENCOUNTER
Caller: Marisa Uribe    Relationship to patient: Self    Best call back number:     [phone]       Patient is needing: PATIENT WAS RETURNING CALL, WAS NOT SURE IF IT WAS REGARDING HERSELF OR SPOUSE.

## 2024-04-23 ENCOUNTER — PRE-ADMISSION TESTING (OUTPATIENT)
Dept: PREADMISSION TESTING | Facility: HOSPITAL | Age: 74
End: 2024-04-23
Payer: MEDICARE

## 2024-04-23 VITALS
SYSTOLIC BLOOD PRESSURE: 158 MMHG | DIASTOLIC BLOOD PRESSURE: 78 MMHG | WEIGHT: 164.46 LBS | HEART RATE: 68 BPM | HEIGHT: 63 IN | OXYGEN SATURATION: 96 % | BODY MASS INDEX: 29.14 KG/M2 | TEMPERATURE: 98.2 F | RESPIRATION RATE: 16 BRPM

## 2024-04-23 DIAGNOSIS — Z01.818 PRE-OP TESTING: Primary | ICD-10-CM

## 2024-04-23 DIAGNOSIS — M16.12 PRIMARY OSTEOARTHRITIS OF LEFT HIP: ICD-10-CM

## 2024-04-23 LAB
ALBUMIN SERPL-MCNC: 4.3 G/DL (ref 3.5–5.2)
ALBUMIN/GLOB SERPL: 1.9 G/DL
ALP SERPL-CCNC: 64 U/L (ref 39–117)
ALT SERPL W P-5'-P-CCNC: 20 U/L (ref 1–33)
ANION GAP SERPL CALCULATED.3IONS-SCNC: 10.8 MMOL/L (ref 5–15)
AST SERPL-CCNC: 18 U/L (ref 1–32)
BASOPHILS # BLD AUTO: 0.04 10*3/MM3 (ref 0–0.2)
BASOPHILS NFR BLD AUTO: 0.7 % (ref 0–1.5)
BILIRUB SERPL-MCNC: 0.5 MG/DL (ref 0–1.2)
BUN SERPL-MCNC: 19 MG/DL (ref 8–23)
BUN/CREAT SERPL: 22.4 (ref 7–25)
CALCIUM SPEC-SCNC: 9.5 MG/DL (ref 8.6–10.5)
CHLORIDE SERPL-SCNC: 99 MMOL/L (ref 98–107)
CO2 SERPL-SCNC: 27.2 MMOL/L (ref 22–29)
CREAT SERPL-MCNC: 0.85 MG/DL (ref 0.57–1)
DEPRECATED RDW RBC AUTO: 40 FL (ref 37–54)
EGFRCR SERPLBLD CKD-EPI 2021: 72.9 ML/MIN/1.73
EOSINOPHIL # BLD AUTO: 0.15 10*3/MM3 (ref 0–0.4)
EOSINOPHIL NFR BLD AUTO: 2.4 % (ref 0.3–6.2)
ERYTHROCYTE [DISTWIDTH] IN BLOOD BY AUTOMATED COUNT: 12.9 % (ref 12.3–15.4)
GLOBULIN UR ELPH-MCNC: 2.3 GM/DL
GLUCOSE SERPL-MCNC: 90 MG/DL (ref 65–99)
HBA1C MFR BLD: 4.9 % (ref 4.8–5.6)
HCT VFR BLD AUTO: 37.4 % (ref 34–46.6)
HGB BLD-MCNC: 12.6 G/DL (ref 12–15.9)
IMM GRANULOCYTES # BLD AUTO: 0.02 10*3/MM3 (ref 0–0.05)
IMM GRANULOCYTES NFR BLD AUTO: 0.3 % (ref 0–0.5)
INR PPP: 0.92 (ref 0.86–1.15)
LYMPHOCYTES # BLD AUTO: 1.04 10*3/MM3 (ref 0.7–3.1)
LYMPHOCYTES NFR BLD AUTO: 16.9 % (ref 19.6–45.3)
MCH RBC QN AUTO: 29.1 PG (ref 26.6–33)
MCHC RBC AUTO-ENTMCNC: 33.7 G/DL (ref 31.5–35.7)
MCV RBC AUTO: 86.4 FL (ref 79–97)
MONOCYTES # BLD AUTO: 0.45 10*3/MM3 (ref 0.1–0.9)
MONOCYTES NFR BLD AUTO: 7.3 % (ref 5–12)
NEUTROPHILS NFR BLD AUTO: 4.45 10*3/MM3 (ref 1.7–7)
NEUTROPHILS NFR BLD AUTO: 72.4 % (ref 42.7–76)
NRBC BLD AUTO-RTO: 0 /100 WBC (ref 0–0.2)
PLATELET # BLD AUTO: 239 10*3/MM3 (ref 140–450)
PMV BLD AUTO: 8.8 FL (ref 6–12)
POTASSIUM SERPL-SCNC: 3.9 MMOL/L (ref 3.5–5.2)
PROT SERPL-MCNC: 6.6 G/DL (ref 6–8.5)
PROTHROMBIN TIME: 12.6 SECONDS (ref 11.8–14.9)
RBC # BLD AUTO: 4.33 10*6/MM3 (ref 3.77–5.28)
SODIUM SERPL-SCNC: 137 MMOL/L (ref 136–145)
WBC NRBC COR # BLD AUTO: 6.15 10*3/MM3 (ref 3.4–10.8)

## 2024-04-23 PROCEDURE — 36415 COLL VENOUS BLD VENIPUNCTURE: CPT

## 2024-04-23 PROCEDURE — 85025 COMPLETE CBC W/AUTO DIFF WBC: CPT

## 2024-04-23 PROCEDURE — 93005 ELECTROCARDIOGRAM TRACING: CPT

## 2024-04-23 PROCEDURE — 83036 HEMOGLOBIN GLYCOSYLATED A1C: CPT | Performed by: ORTHOPAEDIC SURGERY

## 2024-04-23 PROCEDURE — 85610 PROTHROMBIN TIME: CPT | Performed by: ORTHOPAEDIC SURGERY

## 2024-04-23 PROCEDURE — 80053 COMPREHEN METABOLIC PANEL: CPT

## 2024-04-23 RX ORDER — OMEPRAZOLE 20 MG/1
20 CAPSULE, DELAYED RELEASE ORAL DAILY PRN
COMMUNITY

## 2024-04-23 NOTE — DISCHARGE INSTRUCTIONS
IMPORTANT INSTRUCTIONS - PRE-ADMISSION TESTING  DO NOT EAT OR CHEW anything after midnight the night before your procedure.    You may have CLEAR liquids up to __3____ hours prior to ARRIVAL time.   Take the following medications the morning of your procedure with JUST A SIP OF WATER:  ___________  AMLODIPINE, LEVOTHYROXINE, OMEPRAZOLE IF NEEDED____________________________________________________________________________________________________________________________________________________________________________    DO NOT BRING your medications to the hospital with you, UNLESS something has changed since your PRE-Admission Testing appointment.  AFTER 4/23/24 Hold all vitamins, supplements, and NSAIDS (Non- steroidal anti-inflammatory meds) for one week prior to surgery (you MAY take Tylenol or Acetaminophen).  If you are diabetic, check your blood sugar the morning of your procedure. If it is less than 70 or if you are feeling symptomatic, call the following number for further instructions: [phone]  Use your inhalers/nebulizers as usual, the morning of your procedure. BRING YOUR INHALERS with you.   Bring your CPAP or BIPAP to hospital, ONLY IF YOU WILL BE SPENDING THE NIGHT.   Make sure you have a ride home and have someone who will stay with you the day of your procedure after you go home.  If you have any questions, please call your Pre-Admission Testing Nurse, ___PAOLA_____________ at [phone]___________.   Per anesthesia request, do not smoke for 24 hours before your procedure or as instructed by your surgeon.   WILL CALL ON 4/30/24 NORMALLY BETWEEN 1 AND 4 PM TO GIVE OFFICIAL ARRIVAL TIME FOR DAY OF PROCEDURE  DRINK 2O OZ GATORADE OR POWERADE NO RED AT LEAST 3 HOURS PRIOR TO ARRIVAL. REFER TO CLEAR LIQUID DIET SHEET FOR OTHER APPROVED CLEAR LIQUIDS CAN HAVE SIPS OF NO RED UP TO 3 HOURS PRIOR TO ARRIVAL  REFER TO PAGE 9 IN TOTAL JOINT BOOK FOR BATHING INSTRUCTIONS. NO JEWELRY OF ANY TYPE OR NAIL  POLISH UPPER OR LOWER EXT DAY OF SURGERY  COME TO SAME AREA HAD PAT APPT ENTRANCE A, ELEVATOR A, 3RD FLOOR DAY OF SURGERY  CASH, CHECK, OR CARD FOR MEDS TO BED IF INDICATED AT DISCHARGE  IF DEVELOP OPEN AREA, RASH, OR BECOME SICK PRIOR TO SURGERY PLEASE LET THIS NURSE AND [facility] KNOW. IF ANY OF THESE OCCUR NIGHT PRIOR TO PROCEDURE PLEASE CALL [phone] AT 0530 AM TO MAKE STAFF AWARE

## 2024-04-24 ENCOUNTER — ANESTHESIA EVENT (OUTPATIENT)
Dept: PERIOP | Facility: HOSPITAL | Age: 74
End: 2024-04-24
Payer: MEDICARE

## 2024-04-25 LAB
QT INTERVAL: 427 MS
QTC INTERVAL: 428 MS

## 2024-04-29 RX ORDER — LOSARTAN POTASSIUM AND HYDROCHLOROTHIAZIDE 25; 100 MG/1; MG/1
1 TABLET ORAL DAILY
Qty: 90 TABLET | Refills: 1 | Status: SHIPPED | OUTPATIENT
Start: 2024-04-29

## 2024-05-01 ENCOUNTER — APPOINTMENT (OUTPATIENT)
Dept: GENERAL RADIOLOGY | Facility: HOSPITAL | Age: 74
End: 2024-05-01
Payer: MEDICARE

## 2024-05-01 ENCOUNTER — ANESTHESIA (OUTPATIENT)
Dept: PERIOP | Facility: HOSPITAL | Age: 74
End: 2024-05-01
Payer: MEDICARE

## 2024-05-01 ENCOUNTER — HOSPITAL ENCOUNTER (OUTPATIENT)
Facility: HOSPITAL | Age: 74
Discharge: HOME | End: 2024-05-02
Attending: ORTHOPAEDIC SURGERY | Admitting: ORTHOPAEDIC SURGERY
Payer: MEDICARE

## 2024-05-01 DIAGNOSIS — M16.12 PRIMARY OSTEOARTHRITIS OF LEFT HIP: ICD-10-CM

## 2024-05-01 DIAGNOSIS — Z74.09 IMPAIRED MOBILITY AND ADLS: ICD-10-CM

## 2024-05-01 DIAGNOSIS — Z78.9 IMPAIRED MOBILITY AND ADLS: ICD-10-CM

## 2024-05-01 DIAGNOSIS — R26.2 DIFFICULTY IN WALKING: Primary | ICD-10-CM

## 2024-05-01 LAB
HCT VFR BLD AUTO: 34.6 % (ref 34–46.6)
HGB BLD-MCNC: 11.4 G/DL (ref 12–15.9)

## 2024-05-01 PROCEDURE — 25010000002 PROPOFOL 10 MG/ML EMULSION: Performed by: NURSE ANESTHETIST, CERTIFIED REGISTERED

## 2024-05-01 PROCEDURE — 94799 UNLISTED PULMONARY SVC/PX: CPT

## 2024-05-01 PROCEDURE — 25010000002 EPINEPHRINE 1 MG/ML SOLUTION: Performed by: ORTHOPAEDIC SURGERY

## 2024-05-01 PROCEDURE — 63710000001 FERROUS SULFATE 325 (65 FE) MG TABLET: Performed by: ORTHOPAEDIC SURGERY

## 2024-05-01 PROCEDURE — A9270 NON-COVERED ITEM OR SERVICE: HCPCS | Performed by: ORTHOPAEDIC SURGERY

## 2024-05-01 PROCEDURE — 25810000003 LACTATED RINGERS PER 1000 ML: Performed by: ORTHOPAEDIC SURGERY

## 2024-05-01 PROCEDURE — A9270 NON-COVERED ITEM OR SERVICE: HCPCS | Performed by: ANESTHESIOLOGY

## 2024-05-01 PROCEDURE — 63710000001 ACETAMINOPHEN EXTRA STRENGTH 500 MG TABLET: Performed by: ORTHOPAEDIC SURGERY

## 2024-05-01 PROCEDURE — 25010000002 SUGAMMADEX 200 MG/2ML SOLUTION: Performed by: NURSE ANESTHETIST, CERTIFIED REGISTERED

## 2024-05-01 PROCEDURE — 25010000002 CEFAZOLIN PER 500 MG: Performed by: ORTHOPAEDIC SURGERY

## 2024-05-01 PROCEDURE — 27130 TOTAL HIP ARTHROPLASTY: CPT | Performed by: ORTHOPAEDIC SURGERY

## 2024-05-01 PROCEDURE — 25010000002 KETOROLAC TROMETHAMINE PER 15 MG: Performed by: ORTHOPAEDIC SURGERY

## 2024-05-01 PROCEDURE — 63710000001 ALUMINUM-MAGNESIUM HYDROXIDE-SIMETHICONE 400-400-40 MG/5ML SUSPENSION: Performed by: PHYSICIAN ASSISTANT

## 2024-05-01 PROCEDURE — 27130 TOTAL HIP ARTHROPLASTY: CPT | Performed by: PHYSICIAN ASSISTANT

## 2024-05-01 PROCEDURE — 76000 FLUOROSCOPY <1 HR PHYS/QHP: CPT

## 2024-05-01 PROCEDURE — 94761 N-INVAS EAR/PLS OXIMETRY MLT: CPT

## 2024-05-01 PROCEDURE — 97535 SELF CARE MNGMENT TRAINING: CPT

## 2024-05-01 PROCEDURE — 99203 OFFICE O/P NEW LOW 30 MIN: CPT | Performed by: PHYSICIAN ASSISTANT

## 2024-05-01 PROCEDURE — 63710000001 CELECOXIB 100 MG CAPSULE: Performed by: ANESTHESIOLOGY

## 2024-05-01 PROCEDURE — 25010000002 HYDROMORPHONE 1 MG/ML SOLUTION: Performed by: NURSE ANESTHETIST, CERTIFIED REGISTERED

## 2024-05-01 PROCEDURE — 97161 PT EVAL LOW COMPLEX 20 MIN: CPT

## 2024-05-01 PROCEDURE — 25010000002 CEFAZOLIN SODIUM 2 G RECONSTITUTED SOLUTION: Performed by: ORTHOPAEDIC SURGERY

## 2024-05-01 PROCEDURE — 73502 X-RAY EXAM HIP UNI 2-3 VIEWS: CPT

## 2024-05-01 PROCEDURE — 97116 GAIT TRAINING THERAPY: CPT

## 2024-05-01 PROCEDURE — 85018 HEMOGLOBIN: CPT | Performed by: ORTHOPAEDIC SURGERY

## 2024-05-01 PROCEDURE — 63710000001 ACETAMINOPHEN EXTRA STRENGTH 500 MG TABLET: Performed by: ANESTHESIOLOGY

## 2024-05-01 PROCEDURE — 25010000002 MORPHINE PER 10 MG: Performed by: ORTHOPAEDIC SURGERY

## 2024-05-01 PROCEDURE — C1776 JOINT DEVICE (IMPLANTABLE): HCPCS | Performed by: ORTHOPAEDIC SURGERY

## 2024-05-01 PROCEDURE — 25810000003 LACTATED RINGERS PER 1000 ML: Performed by: ANESTHESIOLOGY

## 2024-05-01 PROCEDURE — 25010000002 ROPIVACAINE PER 1 MG: Performed by: ORTHOPAEDIC SURGERY

## 2024-05-01 PROCEDURE — 25010000002 ONDANSETRON PER 1 MG: Performed by: NURSE ANESTHETIST, CERTIFIED REGISTERED

## 2024-05-01 PROCEDURE — A9270 NON-COVERED ITEM OR SERVICE: HCPCS | Performed by: PHYSICIAN ASSISTANT

## 2024-05-01 PROCEDURE — 25010000002 FENTANYL CITRATE (PF) 50 MCG/ML SOLUTION: Performed by: NURSE ANESTHETIST, CERTIFIED REGISTERED

## 2024-05-01 PROCEDURE — 63710000001 FAMOTIDINE 20 MG TABLET: Performed by: ORTHOPAEDIC SURGERY

## 2024-05-01 PROCEDURE — 63710000001 SCOPOLAMINE 1 MG/3DAYS PATCH 72 HOUR: Performed by: ANESTHESIOLOGY

## 2024-05-01 PROCEDURE — 25010000002 MIDAZOLAM PER 1MG: Performed by: ANESTHESIOLOGY

## 2024-05-01 PROCEDURE — 25010000002 DEXAMETHASONE PER 1 MG: Performed by: NURSE ANESTHETIST, CERTIFIED REGISTERED

## 2024-05-01 PROCEDURE — 97150 GROUP THERAPEUTIC PROCEDURES: CPT

## 2024-05-01 PROCEDURE — A9270 NON-COVERED ITEM OR SERVICE: HCPCS | Performed by: NURSE ANESTHETIST, CERTIFIED REGISTERED

## 2024-05-01 PROCEDURE — 97165 OT EVAL LOW COMPLEX 30 MIN: CPT

## 2024-05-01 PROCEDURE — 63710000001 OXYCODONE 5 MG TABLET: Performed by: NURSE ANESTHETIST, CERTIFIED REGISTERED

## 2024-05-01 PROCEDURE — 85014 HEMATOCRIT: CPT | Performed by: ORTHOPAEDIC SURGERY

## 2024-05-01 DEVICE — SHLL ACET G7 PPS LTD/HL TI SZE 52MM: Type: IMPLANTABLE DEVICE | Site: HIP | Status: FUNCTIONAL

## 2024-05-01 DEVICE — BIOLOX® DELTA, CERAMIC FEMORAL HEAD, S, Ø 36/-3.5, TAPER 12/14
Type: IMPLANTABLE DEVICE | Site: HIP | Status: FUNCTIONAL
Brand: BIOLOX® DELTA

## 2024-05-01 DEVICE — LINER ACET G7 NTRL E1 SZE 36MM: Type: IMPLANTABLE DEVICE | Site: HIP | Status: FUNCTIONAL

## 2024-05-01 DEVICE — SUT NONABS MAXBRAID NMBR5 K60 38IN WHT/BLU: Type: IMPLANTABLE DEVICE | Site: HIP | Status: FUNCTIONAL

## 2024-05-01 DEVICE — STEM FEM/HIP AVENIR/COMPLETE HI/OFFST HA SZ4: Type: IMPLANTABLE DEVICE | Site: HIP | Status: FUNCTIONAL

## 2024-05-01 DEVICE — SCRW ACET CORT TRILOGY S/TAP 6.5X30: Type: IMPLANTABLE DEVICE | Site: HIP | Status: FUNCTIONAL

## 2024-05-01 DEVICE — TOTAL HIP PRIMARY: Type: IMPLANTABLE DEVICE | Site: HIP | Status: FUNCTIONAL

## 2024-05-01 RX ORDER — ONDANSETRON 4 MG/1
4 TABLET, ORALLY DISINTEGRATING ORAL EVERY 6 HOURS PRN
Status: DISCONTINUED | OUTPATIENT
Start: 2024-05-01 | End: 2024-05-02 | Stop reason: HOSPADM

## 2024-05-01 RX ORDER — ONDANSETRON 2 MG/ML
INJECTION INTRAMUSCULAR; INTRAVENOUS AS NEEDED
Status: DISCONTINUED | OUTPATIENT
Start: 2024-05-01 | End: 2024-05-01 | Stop reason: SURG

## 2024-05-01 RX ORDER — ONDANSETRON 2 MG/ML
4 INJECTION INTRAMUSCULAR; INTRAVENOUS ONCE AS NEEDED
Status: DISCONTINUED | OUTPATIENT
Start: 2024-05-01 | End: 2024-05-01 | Stop reason: HOSPADM

## 2024-05-01 RX ORDER — MIDAZOLAM HYDROCHLORIDE 2 MG/2ML
2 INJECTION, SOLUTION INTRAMUSCULAR; INTRAVENOUS ONCE
Status: COMPLETED | OUTPATIENT
Start: 2024-05-01 | End: 2024-05-01

## 2024-05-01 RX ORDER — SODIUM CHLORIDE 9 MG/ML
40 INJECTION, SOLUTION INTRAVENOUS AS NEEDED
Status: DISCONTINUED | OUTPATIENT
Start: 2024-05-01 | End: 2024-05-01 | Stop reason: HOSPADM

## 2024-05-01 RX ORDER — KETOROLAC TROMETHAMINE 15 MG/ML
15 INJECTION, SOLUTION INTRAMUSCULAR; INTRAVENOUS EVERY 6 HOURS
Status: COMPLETED | OUTPATIENT
Start: 2024-05-01 | End: 2024-05-02

## 2024-05-01 RX ORDER — PROMETHAZINE HYDROCHLORIDE 12.5 MG/1
12.5 SUPPOSITORY RECTAL EVERY 6 HOURS PRN
Status: DISCONTINUED | OUTPATIENT
Start: 2024-05-01 | End: 2024-05-02 | Stop reason: HOSPADM

## 2024-05-01 RX ORDER — MAGNESIUM HYDROXIDE 1200 MG/15ML
LIQUID ORAL AS NEEDED
Status: DISCONTINUED | OUTPATIENT
Start: 2024-05-01 | End: 2024-05-01 | Stop reason: HOSPADM

## 2024-05-01 RX ORDER — BUPIVACAINE HCL/0.9 % NACL/PF 0.1 %
2 PLASTIC BAG, INJECTION (ML) EPIDURAL ONCE
Status: COMPLETED | OUTPATIENT
Start: 2024-05-01 | End: 2024-05-01

## 2024-05-01 RX ORDER — CELECOXIB 100 MG/1
200 CAPSULE ORAL ONCE
Status: COMPLETED | OUTPATIENT
Start: 2024-05-01 | End: 2024-05-01

## 2024-05-01 RX ORDER — HYDROCODONE BITARTRATE AND ACETAMINOPHEN 7.5; 325 MG/1; MG/1
1 TABLET ORAL EVERY 4 HOURS PRN
Status: DISCONTINUED | OUTPATIENT
Start: 2024-05-01 | End: 2024-05-02 | Stop reason: HOSPADM

## 2024-05-01 RX ORDER — OXYCODONE HYDROCHLORIDE 5 MG/1
5 TABLET ORAL
Status: DISCONTINUED | OUTPATIENT
Start: 2024-05-01 | End: 2024-05-01 | Stop reason: HOSPADM

## 2024-05-01 RX ORDER — PROMETHAZINE HYDROCHLORIDE 12.5 MG/1
25 TABLET ORAL ONCE AS NEEDED
Status: DISCONTINUED | OUTPATIENT
Start: 2024-05-01 | End: 2024-05-01 | Stop reason: HOSPADM

## 2024-05-01 RX ORDER — SCOLOPAMINE TRANSDERMAL SYSTEM 1 MG/1
1 PATCH, EXTENDED RELEASE TRANSDERMAL ONCE
Status: DISCONTINUED | OUTPATIENT
Start: 2024-05-01 | End: 2024-05-01

## 2024-05-01 RX ORDER — HYDROCODONE BITARTRATE AND ACETAMINOPHEN 7.5; 325 MG/1; MG/1
2 TABLET ORAL EVERY 4 HOURS PRN
Status: DISCONTINUED | OUTPATIENT
Start: 2024-05-01 | End: 2024-05-02 | Stop reason: HOSPADM

## 2024-05-01 RX ORDER — DOCUSATE SODIUM 100 MG/1
100 CAPSULE, LIQUID FILLED ORAL 2 TIMES DAILY PRN
Status: DISCONTINUED | OUTPATIENT
Start: 2024-05-01 | End: 2024-05-02 | Stop reason: HOSPADM

## 2024-05-01 RX ORDER — LIDOCAINE HYDROCHLORIDE 20 MG/ML
INJECTION, SOLUTION EPIDURAL; INFILTRATION; INTRACAUDAL; PERINEURAL AS NEEDED
Status: DISCONTINUED | OUTPATIENT
Start: 2024-05-01 | End: 2024-05-01 | Stop reason: SURG

## 2024-05-01 RX ORDER — ACETAMINOPHEN 500 MG
1000 TABLET ORAL ONCE
Status: COMPLETED | OUTPATIENT
Start: 2024-05-01 | End: 2024-05-01

## 2024-05-01 RX ORDER — PROPOFOL 10 MG/ML
VIAL (ML) INTRAVENOUS AS NEEDED
Status: DISCONTINUED | OUTPATIENT
Start: 2024-05-01 | End: 2024-05-01 | Stop reason: SURG

## 2024-05-01 RX ORDER — BUPIVACAINE HCL/0.9 % NACL/PF 0.1 %
2 PLASTIC BAG, INJECTION (ML) EPIDURAL EVERY 8 HOURS
Qty: 200 ML | Refills: 0 | Status: COMPLETED | OUTPATIENT
Start: 2024-05-01 | End: 2024-05-01

## 2024-05-01 RX ORDER — PROMETHAZINE HYDROCHLORIDE 25 MG/1
25 SUPPOSITORY RECTAL ONCE AS NEEDED
Status: DISCONTINUED | OUTPATIENT
Start: 2024-05-01 | End: 2024-05-01 | Stop reason: HOSPADM

## 2024-05-01 RX ORDER — SODIUM CHLORIDE 9 MG/ML
40 INJECTION, SOLUTION INTRAVENOUS AS NEEDED
Status: DISCONTINUED | OUTPATIENT
Start: 2024-05-01 | End: 2024-05-02 | Stop reason: HOSPADM

## 2024-05-01 RX ORDER — SODIUM CHLORIDE, SODIUM LACTATE, POTASSIUM CHLORIDE, CALCIUM CHLORIDE 600; 310; 30; 20 MG/100ML; MG/100ML; MG/100ML; MG/100ML
100 INJECTION, SOLUTION INTRAVENOUS CONTINUOUS
Status: DISCONTINUED | OUTPATIENT
Start: 2024-05-01 | End: 2024-05-02 | Stop reason: HOSPADM

## 2024-05-01 RX ORDER — FAMOTIDINE 20 MG/1
40 TABLET, FILM COATED ORAL DAILY
Status: DISCONTINUED | OUTPATIENT
Start: 2024-05-01 | End: 2024-05-01

## 2024-05-01 RX ORDER — FERROUS SULFATE 325(65) MG
325 TABLET ORAL
Status: DISCONTINUED | OUTPATIENT
Start: 2024-05-01 | End: 2024-05-02 | Stop reason: HOSPADM

## 2024-05-01 RX ORDER — ACETAMINOPHEN 500 MG
1000 TABLET ORAL EVERY 8 HOURS
Status: DISCONTINUED | OUTPATIENT
Start: 2024-05-01 | End: 2024-05-02 | Stop reason: HOSPADM

## 2024-05-01 RX ORDER — LEVOTHYROXINE SODIUM 112 UG/1
112 TABLET ORAL
Status: DISCONTINUED | OUTPATIENT
Start: 2024-05-02 | End: 2024-05-02 | Stop reason: HOSPADM

## 2024-05-01 RX ORDER — DEXAMETHASONE SODIUM PHOSPHATE 4 MG/ML
INJECTION, SOLUTION INTRA-ARTICULAR; INTRALESIONAL; INTRAMUSCULAR; INTRAVENOUS; SOFT TISSUE AS NEEDED
Status: DISCONTINUED | OUTPATIENT
Start: 2024-05-01 | End: 2024-05-01 | Stop reason: SURG

## 2024-05-01 RX ORDER — FENTANYL CITRATE 50 UG/ML
INJECTION, SOLUTION INTRAMUSCULAR; INTRAVENOUS AS NEEDED
Status: DISCONTINUED | OUTPATIENT
Start: 2024-05-01 | End: 2024-05-01 | Stop reason: SURG

## 2024-05-01 RX ORDER — ROCURONIUM BROMIDE 10 MG/ML
INJECTION, SOLUTION INTRAVENOUS AS NEEDED
Status: DISCONTINUED | OUTPATIENT
Start: 2024-05-01 | End: 2024-05-01 | Stop reason: SURG

## 2024-05-01 RX ORDER — SODIUM CHLORIDE 0.9 % (FLUSH) 0.9 %
10 SYRINGE (ML) INJECTION AS NEEDED
Status: DISCONTINUED | OUTPATIENT
Start: 2024-05-01 | End: 2024-05-01 | Stop reason: HOSPADM

## 2024-05-01 RX ORDER — ACETAMINOPHEN 325 MG/1
325 TABLET ORAL EVERY 4 HOURS PRN
Status: DISCONTINUED | OUTPATIENT
Start: 2024-05-01 | End: 2024-05-02 | Stop reason: HOSPADM

## 2024-05-01 RX ORDER — SODIUM CHLORIDE 0.9 % (FLUSH) 0.9 %
10 SYRINGE (ML) INJECTION AS NEEDED
Status: DISCONTINUED | OUTPATIENT
Start: 2024-05-01 | End: 2024-05-02 | Stop reason: HOSPADM

## 2024-05-01 RX ORDER — PROMETHAZINE HYDROCHLORIDE 25 MG/1
12.5 TABLET ORAL EVERY 6 HOURS PRN
Status: DISCONTINUED | OUTPATIENT
Start: 2024-05-01 | End: 2024-05-02 | Stop reason: HOSPADM

## 2024-05-01 RX ORDER — SODIUM CHLORIDE, SODIUM LACTATE, POTASSIUM CHLORIDE, CALCIUM CHLORIDE 600; 310; 30; 20 MG/100ML; MG/100ML; MG/100ML; MG/100ML
9 INJECTION, SOLUTION INTRAVENOUS CONTINUOUS PRN
Status: DISCONTINUED | OUTPATIENT
Start: 2024-05-01 | End: 2024-05-02 | Stop reason: HOSPADM

## 2024-05-01 RX ORDER — TRANEXAMIC ACID 10 MG/ML
1000 INJECTION, SOLUTION INTRAVENOUS ONCE
Status: COMPLETED | OUTPATIENT
Start: 2024-05-01 | End: 2024-05-01

## 2024-05-01 RX ORDER — SODIUM CHLORIDE 0.9 % (FLUSH) 0.9 %
3 SYRINGE (ML) INJECTION EVERY 12 HOURS SCHEDULED
Status: DISCONTINUED | OUTPATIENT
Start: 2024-05-01 | End: 2024-05-02 | Stop reason: HOSPADM

## 2024-05-01 RX ORDER — ALUMINA, MAGNESIA, AND SIMETHICONE 2400; 2400; 240 MG/30ML; MG/30ML; MG/30ML
15 SUSPENSION ORAL EVERY 6 HOURS PRN
Status: DISCONTINUED | OUTPATIENT
Start: 2024-05-01 | End: 2024-05-02 | Stop reason: HOSPADM

## 2024-05-01 RX ORDER — ONDANSETRON 2 MG/ML
4 INJECTION INTRAMUSCULAR; INTRAVENOUS EVERY 6 HOURS PRN
Status: DISCONTINUED | OUTPATIENT
Start: 2024-05-01 | End: 2024-05-02 | Stop reason: HOSPADM

## 2024-05-01 RX ORDER — PANTOPRAZOLE SODIUM 40 MG/1
40 TABLET, DELAYED RELEASE ORAL
Status: DISCONTINUED | OUTPATIENT
Start: 2024-05-02 | End: 2024-05-02 | Stop reason: HOSPADM

## 2024-05-01 RX ORDER — TRANEXAMIC ACID 10 MG/ML
1000 INJECTION, SOLUTION INTRAVENOUS ONCE
Status: DISCONTINUED | OUTPATIENT
Start: 2024-05-01 | End: 2024-05-01 | Stop reason: HOSPADM

## 2024-05-01 RX ADMIN — Medication 2 G: at 07:19

## 2024-05-01 RX ADMIN — KETOROLAC TROMETHAMINE 15 MG: 15 INJECTION, SOLUTION INTRAMUSCULAR; INTRAVENOUS at 11:18

## 2024-05-01 RX ADMIN — ONDANSETRON HYDROCHLORIDE 4 MG: 2 SOLUTION INTRAMUSCULAR; INTRAVENOUS at 08:30

## 2024-05-01 RX ADMIN — TRANEXAMIC ACID 1000 MG: 10 INJECTION, SOLUTION INTRAVENOUS at 07:04

## 2024-05-01 RX ADMIN — CEFAZOLIN 2 G: 2 INJECTION, POWDER, FOR SOLUTION INTRAVENOUS at 22:30

## 2024-05-01 RX ADMIN — SCOPALAMINE 1 PATCH: 1 PATCH, EXTENDED RELEASE TRANSDERMAL at 07:03

## 2024-05-01 RX ADMIN — CEFAZOLIN 2 G: 2 INJECTION, POWDER, FOR SOLUTION INTRAVENOUS at 14:42

## 2024-05-01 RX ADMIN — KETOROLAC TROMETHAMINE 15 MG: 15 INJECTION, SOLUTION INTRAMUSCULAR; INTRAVENOUS at 17:16

## 2024-05-01 RX ADMIN — ACETAMINOPHEN 1000 MG: 500 TABLET ORAL at 11:17

## 2024-05-01 RX ADMIN — FERROUS SULFATE TAB 325 MG (65 MG ELEMENTAL FE) 325 MG: 325 (65 FE) TAB at 11:17

## 2024-05-01 RX ADMIN — PROPOFOL 50 MG: 10 INJECTION, EMULSION INTRAVENOUS at 07:35

## 2024-05-01 RX ADMIN — SUGAMMADEX 150 MG: 100 INJECTION, SOLUTION INTRAVENOUS at 08:35

## 2024-05-01 RX ADMIN — ROCURONIUM BROMIDE 50 MG: 10 INJECTION, SOLUTION INTRAVENOUS at 07:25

## 2024-05-01 RX ADMIN — OXYCODONE 5 MG: 5 TABLET ORAL at 09:30

## 2024-05-01 RX ADMIN — CELECOXIB 200 MG: 100 CAPSULE ORAL at 07:02

## 2024-05-01 RX ADMIN — LIDOCAINE HYDROCHLORIDE 60 MG: 20 INJECTION, SOLUTION INTRAVENOUS at 07:25

## 2024-05-01 RX ADMIN — Medication 3 ML: at 20:55

## 2024-05-01 RX ADMIN — HYDROMORPHONE HYDROCHLORIDE 0.5 MG: 1 INJECTION, SOLUTION INTRAMUSCULAR; INTRAVENOUS; SUBCUTANEOUS at 09:00

## 2024-05-01 RX ADMIN — TRANEXAMIC ACID 1000 MG: 100 INJECTION INTRAVENOUS at 08:26

## 2024-05-01 RX ADMIN — SODIUM CHLORIDE, POTASSIUM CHLORIDE, SODIUM LACTATE AND CALCIUM CHLORIDE: 600; 310; 30; 20 INJECTION, SOLUTION INTRAVENOUS at 08:43

## 2024-05-01 RX ADMIN — MIDAZOLAM HYDROCHLORIDE 2 MG: 1 INJECTION, SOLUTION INTRAMUSCULAR; INTRAVENOUS at 07:05

## 2024-05-01 RX ADMIN — SODIUM CHLORIDE, POTASSIUM CHLORIDE, SODIUM LACTATE AND CALCIUM CHLORIDE 100 ML/HR: 600; 310; 30; 20 INJECTION, SOLUTION INTRAVENOUS at 11:11

## 2024-05-01 RX ADMIN — DEXAMETHASONE SODIUM PHOSPHATE 4 MG: 4 INJECTION, SOLUTION INTRAMUSCULAR; INTRAVENOUS at 07:20

## 2024-05-01 RX ADMIN — ACETAMINOPHEN 1000 MG: 500 TABLET ORAL at 07:02

## 2024-05-01 RX ADMIN — FENTANYL CITRATE 50 MCG: 50 INJECTION, SOLUTION INTRAMUSCULAR; INTRAVENOUS at 07:42

## 2024-05-01 RX ADMIN — ACETAMINOPHEN 1000 MG: 500 TABLET ORAL at 18:25

## 2024-05-01 RX ADMIN — HYDROMORPHONE HYDROCHLORIDE 0.5 MG: 1 INJECTION, SOLUTION INTRAMUSCULAR; INTRAVENOUS; SUBCUTANEOUS at 09:10

## 2024-05-01 RX ADMIN — ALUMINUM HYDROXIDE, MAGNESIUM HYDROXIDE, AND DIMETHICONE 15 ML: 400; 400; 40 SUSPENSION ORAL at 22:22

## 2024-05-01 RX ADMIN — TRANEXAMIC ACID 1000 MG: 100 INJECTION INTRAVENOUS at 07:19

## 2024-05-01 RX ADMIN — KETOROLAC TROMETHAMINE 15 MG: 15 INJECTION, SOLUTION INTRAMUSCULAR; INTRAVENOUS at 22:28

## 2024-05-01 RX ADMIN — FAMOTIDINE 40 MG: 20 TABLET ORAL at 11:17

## 2024-05-01 RX ADMIN — SODIUM CHLORIDE, POTASSIUM CHLORIDE, SODIUM LACTATE AND CALCIUM CHLORIDE: 600; 310; 30; 20 INJECTION, SOLUTION INTRAVENOUS at 07:19

## 2024-05-01 RX ADMIN — PROPOFOL 150 MG: 10 INJECTION, EMULSION INTRAVENOUS at 07:25

## 2024-05-01 RX ADMIN — FENTANYL CITRATE 50 MCG: 50 INJECTION, SOLUTION INTRAMUSCULAR; INTRAVENOUS at 07:25

## 2024-05-01 NOTE — SIGNIFICANT NOTE
05/01/24 1537   Plan   Final Discharge Disposition Code 01 - home or self-care   Final Note PTA Maximiliano. Appt: 5/3/24 at 9AM.

## 2024-05-01 NOTE — CONSULTS
[facility]   HOSPITALIST HISTORY AND PHYSICAL  Date: 2024   Patient Name: Marisa Uribe  : 1950  MRN: [id number]  Primary Care Physician:  Amy Goel DO  Date of admission: 2024    Subjective   Subjective   Chief Complaint: Medical management    HPI:    Marisa Uribe is a 73 y.o. female who is being seen by hospitalist for general medical management of chronic medical issues including GERD, HTN, hypothyroidism, dyslipidemia?, vitamin D deficiency, and osteoarthritis left hip.  She had left hip replacement 24 by Dr. Douglass.  Home medications reviewed and include Prilosec, multivitamin, losartan-HCTZ, levothyroxine, amlodipine.  Recent lab work reviewed and includes creatinine 0.8, potassium 3.9, A1c 4.9, TSH elevated 8.0, T4 elevated 1.7, WBC 6.1, Hgb 12.6, platelets 239.  Recent vital signs include T98.4, P 57 bpm, RR 16, //62, O2 sats 96% on room air.    Currently resting in recliner.  Denies any nausea or vomiting.  Has scopolamine patch on.  Only took a couple of bites of lunch earlier.  Has ambulated down hallway on [facility].  Left hip pain reasonably controlled.  Denies any chest pains or shortness of air.  No wheezing.  No palpitations lightheadedness or syncope.  Pertinent History   Past Medical History:    Active Ambulatory Problems     Diagnosis Date Noted    Acquired hypothyroidism 2020    Essential hypertension 2020    Mixed hyperlipidemia 2021    Muscle spasm 2022    Osteoarthritis of left hip 2022    Gastroesophageal reflux disease without esophagitis 2022    Vitamin D deficiency 2022    Chronic bilateral low back pain without sciatica 2023    Class 1 obesity due to excess calories with serious comorbidity and body mass index (BMI) of 30.0 to 30.9 in adult 2024    Pain in both knees 2024    Arthritis of left hip 03/15/2024     Resolved Ambulatory Problems     Diagnosis Date Noted    Overweight with  body mass index (BMI) of 29 to 29.9 in adult 2021    Medicare annual wellness visit, subsequent 2022    Primary osteoarthritis involving multiple joints 2023     Past Medical History:   Diagnosis Date    GERD (gastroesophageal reflux disease)     Hypertension     Hypothyroidism     Osteoarthritis     Thyroid disorder        Past Surgical History:    Past Surgical History:   Procedure Laterality Date    COLONOSCOPY      LEG SURGERY Right     plate and pins spring 2020    MOUTH SURGERY      HAD TOOTH IMPLANTED OVER A YEAR AGO    TOTAL HIP ARTHROPLASTY Left 2024    Procedure: LEFT TOTAL HIP ARTHROPLASTY;  Surgeon: Bg Douglass MD;  Location: [facility] MAIN OR;  Service: Orthopedics;  Laterality: Left;    WRIST SURGERY      right arm and wrist plate and screws        Social History:   .  Lives with  in [facility].  Retired .  Remote smoker; quit   Social History     Socioeconomic History    Marital status:    Tobacco Use    Smoking status: Former     Current packs/day: 0.00     Average packs/day: 2.0 packs/day for 15.0 years (30.0 ttl pk-yrs)     Types: Cigarettes     Start date: 1967     Quit date: 1982     Years since quittin.3    Smokeless tobacco: Never    Tobacco comments:     no second hand smoke exposure   Vaping Use    Vaping status: Never Used   Substance and Sexual Activity    Alcohol use: Yes     Alcohol/week: 1.0 standard drink of alcohol     Types: 1 Glasses of wine per week     Comment: rare occasion    Drug use: Never    Sexual activity: Defer       Family History:     Family History   Problem Relation Age of Onset    Thyroid disease Mother     Cancer Mother         lung     Diabetes Father         type 1     Heart disease Father         heart attack 63    Mental illness Brother        Home Medications (reported)  Current Outpatient Medications   Medication Instructions    amLODIPine (NORVASC) 10 mg, Oral, Daily     levothyroxine (SYNTHROID, LEVOTHROID) 112 mcg, Oral, Daily    losartan-hydrochlorothiazide (HYZAAR) 100-25 MG per tablet 1 tablet, Oral, Daily    Nutritional Supplements (NUTRITIONAL SUPPLEMENT PO) 1 packet, Oral, 3 Times Daily, 1 packet TID  for minor aches and pains    omeprazole (PRILOSEC) 20 mg, Oral, Daily PRN     Allergies:  No Known Allergies    REVIEW OF SYSTEMS:   Left hip pain    Objective   Objective   Vitals:   Temp:  [97.6 °F (36.4 °C)-98.1 °F (36.7 °C)] 98.1 °F (36.7 °C)  Heart Rate:  [52-76] 63  Resp:  [15-18] 16  BP: (115-146)/(66-92) 115/67  Flow (L/min):  [6] 6  PHYSICAL EXAM   CON: WN. WD. NAD.  Pleasant and polite.  NECK:  No thyromegaly. No stridor. Trachea midline.  RESP:  CTA. No wheezes. No crackles.  No work of breathing or tachypnea.   CV:  Rhythm regular. Rate WNL. No murmur noted.  No edema.  GI:  Soft and nontender. Nondistended.  EXT: Left hip dressing intact  PSYCH:  Alert. Oriented. Normal affect and mood.  NEURO:  No dysarthria or aphasia. No unilateral weakness or paresthesia.  SKIN: No chronic venous stasis changes or varicosities.  No cellulitis    Result Review    Result Review:  I have personally reviewed the results from the time of this admission to 5/1/2024 15:44 EDT and agree with these findings:  []  Laboratory  []  Microbiology  []  Radiology  []  EKG/Telemetry   []  Cardiology/Vascular   []  Pathology  []  Old records  []  Other:    Assessment & Plan   Assessment / Plan   Assessment:    Medical management  HTN  Dyslipidemia  Hypothyroidism  GERD  OA left hip  Left hip replacement 5/1/24 by Dr. Douglass     Plan:    Medical management do the following: hold home losartan/hydrochlorothiazide, and amlodipine.  Monitor blood pressure trend.  Resume when blood pressure allows.  Gentle IV fluid hydration overnight  Check BMP and CBC in the morning  Monitor for any orthostasis/lightheadedness  Continue PPI  Pain med Rx per orthopedist  DVT prophylaxis per orthopedist    DVT  prophylaxis:  Medical and mechanical DVT prophylaxis orders are present.      CODE STATUS:         Electronically signed by ZENOBIA Vivas, 05/01/24, 3:44 PM EDT.

## 2024-05-01 NOTE — THERAPY EVALUATION
Patient Name: Marisa Uribe  : 1950    MRN: [id number]                              Today's Date: 2024       Admit Date: 2024    Visit Dx:     ICD-10-CM ICD-9-CM   1. Difficulty in walking  R26.2 719.7   2. Primary osteoarthritis of left hip  M16.12 715.15   3. Impaired mobility and ADLs  Z74.09 V49.89    Z78.9      Patient Active Problem List   Diagnosis    Acquired hypothyroidism    Essential hypertension    Mixed hyperlipidemia    Muscle spasm    Osteoarthritis of left hip    Gastroesophageal reflux disease without esophagitis    Vitamin D deficiency    Chronic bilateral low back pain without sciatica    Class 1 obesity due to excess calories with serious comorbidity and body mass index (BMI) of 30.0 to 30.9 in adult    Pain in both knees    Arthritis of left hip     Past Medical History:   Diagnosis Date    GERD (gastroesophageal reflux disease)     Hypertension     Hypothyroidism     Osteoarthritis     LEFT HIP    Thyroid disorder      Past Surgical History:   Procedure Laterality Date    COLONOSCOPY      LEG SURGERY Right     plate and pins spring 2020    MOUTH SURGERY      HAD TOOTH IMPLANTED OVER A YEAR AGO    WRIST SURGERY      right arm and wrist plate and screws       General Information       Row Name 24 1314 24 1302       OT Time and Intention    Document Type therapy note (daily note)  -AC evaluation  -AC    Mode of Treatment individual therapy;occupational therapy  -AC individual therapy;occupational therapy  -AC      Row Name 24 1314 24 1302       General Information    Patient Profile Reviewed yes  -AC yes  -AC    Prior Level of Function -- independent:;all household mobility;community mobility;ADL's  patient reports no AD, has a walkin shower without seat and 3in1 if needed.  -AC    Existing Precautions/Restrictions fall;weight bearing  -AC fall;weight bearing  -AC    Barriers to Rehab -- none identified  -AC      Row Name 24 1302          Occupational  Profile    Reason for Services/Referral (Occupational Profile) Pt. is a 73year old female admitted for the above diagnosis status post left total hip replacement on 5/1/2024. Pt. referred to OT services to assess independence with ADLs and adl transfers/fx'l mobility. No previous OT services for current condition.  -AC       Row Name 05/01/24 1302          Living Environment    People in Home spouse  -AC       Row Name 05/01/24 1314 05/01/24 1302       Cognition    Orientation Status (Cognition) oriented x 3  -AC oriented x 3  -AC      Row Name 05/01/24 1314 05/01/24 1302       Safety Issues, Functional Mobility    Impairments Affecting Function (Mobility) balance;endurance/activity tolerance;pain  -AC balance;endurance/activity tolerance;pain  -AC              User Key  (r) = Recorded By, (t) = Taken By, (c) = Cosigned By      Initials Name Provider Type    AC Whit Justice, YENI Occupational Therapist                     Mobility/ADL's       Row Name 05/01/24 1314 05/01/24 1305       Bed Mobility    Comment, (Bed Mobility) patient seated in recliner upon OT arrival in the room  -AC patient seated in recliner upon OT arrival in the room  -AC      Row Name 05/01/24 1314 05/01/24 1305       Transfers    Transfers sit-stand transfer  -AC sit-stand transfer  -AC      Row Name 05/01/24 1314 05/01/24 1305       Sit-Stand Transfer    Sit-Stand [place] (Transfers) contact guard;1 person assist;verbal cues  -AC contact guard;1 person assist  -AC    Assistive Device (Sit-Stand Transfers) walker, front-wheeled  -AC walker, front-wheeled  -AC    Comment, (Sit-Stand Transfer) patient was CGA with rolling walker for sit to/from stand x 3 with cues for safety and technique.  -AC --      Row Name 05/01/24 1314 05/01/24 1305       Functional Mobility    Functional Mobility- Ind. Level contact guard assist;1 person;verbal cues required  -AC contact guard assist;1 person  -AC    Functional Mobility- Device walker, front-wheeled   -AC walker, front-wheeled  -AC    Functional Mobility- Comment patient was CGA with rolling walker for functional mobility at recliner with cues for safety and technique.  - --      Row Name 05/01/24 1314 05/01/24 1305       Activities of Daily Living    BADL Assessment/Intervention lower body dressing  -AC --  patient is setup for upper body bathing/dressing, setup for grooming, setup for self-feeding, min A for lower body bathing/dressing, CGA for toileting.  -AC      Row Name 05/01/24 1314 05/01/24 1305       Mobility    Extremity Weight-bearing Status left lower extremity  -AC left lower extremity  -AC    Left Lower Extremity (Weight-bearing Status) weight-bearing as tolerated (WBAT)  -AC weight-bearing as tolerated (WBAT)  -      Row Name 05/01/24 1314          Lower Body Dressing Assessment/Training    [place] Level (Lower Body Dressing) lower body dressing skills;doff;don;pants/bottoms;shoes/slippers;socks;minimum assist (75% patient effort)  -     Position (Lower Body Dressing) supported standing;unsupported sitting  -               User Key  (r) = Recorded By, (t) = Taken By, (c) = Cosigned By      Initials Name Provider Type     Whit Justice OT Occupational Therapist                   Obj/Interventions       Row Name 05/01/24 1316 05/01/24 1307       Sensory Assessment (Somatosensory)    Sensory Assessment (Somatosensory) UE sensation intact  - UE sensation intact  -      Row Name 05/01/24 1316 05/01/24 1307       Vision Assessment/Intervention    Visual Impairment/Limitations WFL  -AC WFL  -      Row Name 05/01/24 1316 05/01/24 1307       Range of Motion Comprehensive    General Range of Motion bilateral upper extremity ROM WNL  -AC bilateral upper extremity ROM WNL  -AC      Row Name 05/01/24 1316 05/01/24 1307       Strength Comprehensive (MMT)    General Manual Muscle Testing (MMT) Assessment no strength deficits identified  - no strength deficits identified  -      Row Name  05/01/24 1316 05/01/24 1307       Motor Skills    Motor Skills coordination;functional endurance  -AC coordination;functional endurance  -AC    Coordination WFL  -AC WFL  -AC    Functional Endurance f+ for adls  -AC fair plus for adls.  -      Row Name 05/01/24 1316 05/01/24 1307       Balance    Balance Assessment standing dynamic balance  -AC standing dynamic balance  -AC    Dynamic Standing Balance contact guard;1-person assist  -AC contact guard;1-person assist  -AC    Position/Device Used, Standing Balance supported;walker, rolling  -AC supported;walker, rolling  -AC    Balance Interventions standing;sit to stand;supported;dynamic;minimal challenge;occupation based/functional task  -AC --              User Key  (r) = Recorded By, (t) = Taken By, (c) = Cosigned By      Initials Name Provider Type    Whit Yates OT Occupational Therapist                   Goals/Plan       Row Name 05/01/24 1309          Transfer Goal 1 (OT)    Activity/Assistive Device (Transfer Goal 1, OT) transfers, all  -AC     [place] Level/Cues Needed (Transfer Goal 1, OT) modified independence  -AC     Time Frame (Transfer Goal 1, OT) long term goal (LTG);10 days  -       Row Name 05/01/24 1309          Bathing Goal 1 (OT)    Activity/Device (Bathing Goal 1, OT) bathing skills, all  -AC     [place] Level/Cues Needed (Bathing Goal 1, OT) modified independence  -AC     Time Frame (Bathing Goal 1, OT) long term goal (LTG);10 days  -       Row Name 05/01/24 1309          Dressing Goal 1 (OT)    Activity/Device (Dressing Goal 1, OT) dressing skills, all  -AC     [place]/Cues Needed (Dressing Goal 1, OT) modified independence  -AC     Time Frame (Dressing Goal 1, OT) long term goal (LTG);10 days  -       Row Name 05/01/24 1309          Toileting Goal 1 (OT)    Activity/Device (Toileting Goal 1, OT) toileting skills, all  -AC     [place] Level/Cues Needed (Toileting Goal 1, OT) modified independence  -AC     Time  Frame (Toileting Goal 1, OT) long term goal (LTG);10 days  -       Row Name 05/01/24 1309          Problem Specific Goal 1 (OT)    Problem Specific Goal 1 (OT) patient will demonstrate good activity tolerance for adls.  -     Time Frame (Problem Specific Goal 1, OT) long term goal (LTG);10 days  -       Row Name 05/01/24 1309          Therapy Assessment/Plan (OT)    Planned Therapy Interventions (OT) activity tolerance training;functional balance retraining;occupation/activity based interventions;ROM/therapeutic exercise;transfer/mobility retraining;patient/caregiver education/training;BADL retraining  -               User Key  (r) = Recorded By, (t) = Taken By, (c) = Cosigned By      Initials Name Provider Type    Whit Yates OT Occupational Therapist                   Clinical Impression       Row Name 05/01/24 1317 05/01/24 1308       Pain Assessment    Pretreatment Pain Rating 0/10 - no pain  -AC 0/10 - no pain  -AC    Posttreatment Pain Rating 0/10 - no pain  -AC 0/10 - no pain  -      Row Name 05/01/24 1317 05/01/24 1308       Plan of Care Review    Plan of Care Reviewed With patient  -AC patient  -AC    Progress improving  -AC no change  -    Outcome Evaluation patient instructed in lower body adaptive dressing technique, weightbearing status, joint protection and safety with adl transfers. patient to continue with therapy services in order to maximize independence with adls.  -AC Patient presents with balance and endurance limitations that impede his/her ability to perform ADLS. The skills of a therapist are necessary to maximize independence with ADLs.  Good  -      Row Name 05/01/24 1308          Therapy Assessment/Plan (OT)    Patient/Family Therapy Goal Statement (OT) Less pain.  -AC     Rehab Potential (OT) good, to achieve stated therapy goals  -     Criteria for Skilled Therapeutic Interventions Met (OT) yes;meets criteria;skilled treatment is necessary  -     Therapy Frequency  (OT) 5 times/wk  -       Row Name 05/01/24 1308          Therapy Plan Review/Discharge Plan (OT)    Equipment Needs Upon Discharge (OT) walker, rolling;commode chair  -AC     Anticipated Discharge Disposition (OT) home with assist;home with outpatient therapy services  -       Row Name 05/01/24 1308          Positioning and Restraints    Pre-Treatment Position sitting in chair/recliner  -AC     Post Treatment Position chair  -AC     In Chair call light within reach;encouraged to call for assist;exit alarm on;legs elevated;reclined  -               User Key  (r) = Recorded By, (t) = Taken By, (c) = Cosigned By      Initials Name Provider Type     Whit Justice, YENI Occupational Therapist                   Outcome Measures       Row Name 05/01/24 1312          How much help from another is currently needed...    Putting on and taking off regular lower body clothing? 3  -AC     Bathing (including washing, rinsing, and drying) 3  -AC     Toileting (which includes using toilet bed pan or urinal) 3  -AC     Putting on and taking off regular upper body clothing 4  -AC     Taking care of personal grooming (such as brushing teeth) 4  -AC     Eating meals 4  -AC     AM-PAC 6 Clicks Score (OT) 21  -       Row Name 05/01/24 1100          How much help from another person do you currently need...    Turning from your back to your side while in flat bed without using bedrails? 3  -LATASHA     Moving from lying on back to sitting on the side of a flat bed without bedrails? 3  -LATASHA     Moving to and from a bed to a chair (including a wheelchair)? 3  -LATASHA     Standing up from a chair using your arms (e.g., wheelchair, bedside chair)? 3  -LATASHA     Climbing 3-5 steps with a railing? 3  -LATASHA     To walk in hospital room? 3  -LATASHA     AM-PAC 6 Clicks Score (PT) 18  -LATASHA     Highest Level of Mobility Goal 6 --> Walk 10 steps or more  -LATASHA       Row Name 05/01/24 1312 05/01/24 1100       Functional Assessment    Outcome Measure Options AM-PAC  6 Clicks Daily Activity (OT);Optimal Instrument  -AC AM-PAC 6 Clicks Basic Mobility (PT)  -LATASHA      Row Name 05/01/24 1312          Optimal Instrument    Optimal Instrument Optimal - 3  -AC     Bending/Stooping 2  -AC     Standing 2  -AC     Reaching 1  -AC     From the list, choose the 3 activities you would most like to be able to do without any difficulty Bending/stooping;Standing;Reaching  -AC     Total Score Optimal - 3 5  -AC               User Key  (r) = Recorded By, (t) = Taken By, (c) = Cosigned By      Initials Name Provider Type    AC Whit Justice, OT Occupational Therapist    Amaury Ortiz, PT Physical Therapist                      OT Recommendation and Plan  Planned Therapy Interventions (OT): activity tolerance training, functional balance retraining, occupation/activity based interventions, ROM/therapeutic exercise, transfer/mobility retraining, patient/caregiver education/training, BADL retraining  Therapy Frequency (OT): 5 times/wk  Plan of Care Review  Plan of Care Reviewed With: patient  Progress: improving  Outcome Evaluation: patient instructed in lower body adaptive dressing technique, weightbearing status, joint protection and safety with adl transfers. patient to continue with therapy services in order to maximize independence with adls.     Time Calculation:   Evaluation Complexity (OT)  Review Occupational Profile/Medical/Therapy History Complexity: expanded/moderate complexity  Assessment, Occupational Performance/Identification of Deficit Complexity: 1-3 performance deficits  Clinical Decision Making Complexity (OT): problem focused assessment/low complexity  Overall Complexity of Evaluation (OT): low complexity     Time Calculation- OT       Row Name 05/01/24 1313             Time Calculation- OT    OT Received On 05/01/24  -AC      OT Goal Re-Cert Due Date 05/10/24  -AC         Timed Charges    98472 - OT Self Care/Mgmt Minutes 10  -AC         Untimed Charges    OT Eval/Re-eval  Minutes 31  -AC         Total Minutes    Timed Charges Total Minutes 10  -AC      Untimed Charges Total Minutes 31  -AC       Total Minutes 41  -AC                User Key  (r) = Recorded By, (t) = Taken By, (c) = Cosigned By      Initials Name Provider Type    AC Whit Justice OT Occupational Therapist                  Therapy Charges for Today       Code Description Service Date Service Provider Modifiers Qty    [id number]  OT SELF CARE/MGMT/TRAIN EA 15 MIN 5/1/2024 Whit Justice OT GO 1    [id number]  OT EVAL LOW COMPLEXITY 3 5/1/2024 Whit Justice OT GO 1                 Whit Justice OT  5/1/2024

## 2024-05-01 NOTE — OP NOTE
TOTAL HIP ARTHROPLASTY ANTERIOR  Procedure Report    Patient Name:  Marisa Uribe  YOB: 1950    Date of Surgery:  5/1/2024     Indications:  The patient has had persistent hip pain and x-rays reveal advanced osteoarthritis.  They wish to proceed with operative treatment.  Risks and benefits of surgery were discussed.  Risk of bleeding, infection, damage to neurovascular structures, heart attack, stroke, DVT/PE, anesthesia complications including death, stiffness, dislocation, leg length discrepancy, periprosthetic fracture, deep infection, among others were discussed.  Informed consent was obtained and they wish to proceed.      Pre-op Diagnosis:   Primary osteoarthritis of left hip [M16.12]       Post-Op Diagnosis Codes:     * Primary osteoarthritis of left hip [M16.12]    Procedure/CPT® Codes:      Procedure(s):  LEFT TOTAL HIP ARTHROPLASTY    Staff:  Surgeon(s):  Bg Douglass MD    Assistant: Lashonda Torres RN CSA; Rui Fisher PA    Surgical Approach: Hip Direct Anterior ([place])        Anesthesia: General    Estimated Blood Loss: 200 mL    Implants:    Implant Name Type Inv. Item Serial No.  Lot No. LRB No. Used Action   SUT NONABS MAXBRAID [id number] 38IN WHT/GRICEL - [id number] Implant SUT NONABS MAXBRAID NMBR5 K60 38IN WHT/GRICEL  [employer] [id number] Left 2 Implanted   SHLL ACET G7 PPS LTD/HL TI TIMA 52MM - [id number] Implant SHLL ACET G7 PPS LTD/HL TI TIMA 52MM  [employer] [id number] Left 1 Implanted   SCRW ACET ROBIN TRILOGY S/TAP 6.5X30 - [id number] Implant SCRW ACET ROBIN TRILOGY S/TAP 6.5X30  [employer] [id number] Left 1 Implanted   LINER ACET G7 NTRL E1 TIMA 36MM - [id number] Implant LINER ACET G7 NTRL E1 TIMA 36MM  [employer] [id number] Left 1 Implanted   STEM FEM/HIP AVENIR/COMPLETE HI/OFFST HA SZ4 - [id number] Implant STEM FEM/HIP AVENIR/COMPLETE HI/OFFST HA SZ4  [employer] [id number] Left 1 Implanted       Specimen:          None         Findings: Left hip osteoarthritis    Complications: None    Description of Procedure: The operative site was marked in the preoperative holding area.  The patient was brought to the operating room and placed supine on the [place] operative table.  General anesthesia was given.  All bony prominences were padded. Both legs were placed into padded traction boots. A padded peroneal post was placed. Preoperative antibiotic and tranexamic acid was given. Formal time-out was held.   An incision was made over the anterior hip just lateral and distal to the ASIS. The fascia was sharply divided and the TFL muscle was retracted laterally. The circumflex vessels were treated with the bipolar sealer. The capsule was incised in an inverted T capsulotomy and tagged with nonabsorbable suture. The femoral neck osteotomy was made and the femoral head was removed. The labrum was excised. Acetabular reaming was performed with fluoroscopic guidance. An appropriately sized acetabular component was inserted in the appropriate position. A screw was inserted, and the polyethylene liner was placed.  At this point the appropriate femoral releases were performed and the femur was exposed. We began preparing the femur with the broach. Sequential broaching was performed until an appropriately sized broach was placed in neutral anteversion and the hip was trialed. Fluoroscopic views of the hip were taken and confirmed adequate size and appropriate leg length and offset. The hip was dislocated, and the trials removed. The femoral implant was placed and was axially and rotationally stable. The hip was trialed a final time, and the appropriate femoral head was inserted. The hip was reduced and final images were taken. The hip was irrigated with irrisept solution and bacitracin saline. Local anesthetic was injected around the capsule, and the capsule was closed with #1 vicryl. The fascia was closed with #1 vicryl and the subcutaneous tissues with 2-0  vicryl. The skin was closed with staples and an aquacel dressing was placed. The patient awoke form anesthesia in stable condition. All counts were correct. There were no complications.    Assistant: Lashonda Torres RN CSA; Rui Fisher PA  was responsible for performing the following activities: Retraction, Suction, Irrigation, and Placing Dressing and their skilled assistance was necessary for the success of this case.    Bg Douglass MD     Date: 5/1/2024  Time: 08:54 EDT

## 2024-05-01 NOTE — PLAN OF CARE
Goal Outcome Evaluation:  Plan of Care Reviewed With: patient           Outcome Evaluation: Pt alert and oriented x 4, VSS. Pt's pain well-controlled, resting comfortably without complaint. Pt participating in all therapies without issue. IV fluids infusing per order, expected discharge home 05/02/2024.

## 2024-05-01 NOTE — ANESTHESIA PREPROCEDURE EVALUATION
Anesthesia Evaluation     Patient summary reviewed and Nursing notes reviewed   history of anesthetic complications:  PONV  NPO Solid Status: > 8 hours  NPO Liquid Status: > 2 hours           Airway   Mallampati: II  TM distance: >3 FB  Neck ROM: full  No difficulty expected  Dental      Pulmonary - normal exam    breath sounds clear to auscultation  (+) a smoker Former,  Cardiovascular - normal exam  Exercise tolerance: poor (<4 METS)    Rhythm: regular  Rate: normal    (+) hypertension, hyperlipidemia      Neuro/Psych- negative ROS  GI/Hepatic/Renal/Endo    (+) thyroid problem     Musculoskeletal     Abdominal    Substance History - negative use     OB/GYN          Other   arthritis,     ROS/Med Hx Other: <4METS, DECREASED MOBILITY. HX HNT,THYROID DISEASE, GERD, HIP/BACK PAIN. PCP OV 3/8/24. TJR. KT                Anesthesia Plan    ASA 2     general     Reason for not using neuraxial anesthesia or peripheral nerve block: Anesthesiologist Preference  (Patient understands anesthesia not responsible for dental damage.)  intravenous induction     Anesthetic plan, risks, benefits, and alternatives have been provided, discussed and informed consent has been obtained with: patient.    Plan discussed with CRNA.    CODE STATUS:

## 2024-05-01 NOTE — PLAN OF CARE
Goal Outcome Evaluation:  Plan of Care Reviewed With: patient        Progress: no change  Outcome Evaluation: Patient presents with balance and endurance limitations that impede his/her ability to perform ADLS. The skills of a therapist are necessary to maximize independence with ADLs.  Good      Anticipated Discharge Disposition (OT): home with assist, home with outpatient therapy services                         THR/Other:

## 2024-05-01 NOTE — THERAPY EVALUATION
Acute Care - Physical Therapy Initial Evaluation   [facility]     Patient Name: Marisa Uribe  : 1950  MRN: [id number]  Today's Date: 2024     Admit date: 2024     Referring Physician: Bg Douglass MD     Surgery Date:2024   Procedure(s) (LRB):  LEFT TOTAL HIP ARTHROPLASTY (Left) ;     Visit Dx:     ICD-10-CM ICD-9-CM   1. Difficulty in walking  R26.2 719.7   2. Primary osteoarthritis of left hip  M16.12 715.15     Patient Active Problem List   Diagnosis    Acquired hypothyroidism    Essential hypertension    Mixed hyperlipidemia    Muscle spasm    Osteoarthritis of left hip    Gastroesophageal reflux disease without esophagitis    Vitamin D deficiency    Chronic bilateral low back pain without sciatica    Class 1 obesity due to excess calories with serious comorbidity and body mass index (BMI) of 30.0 to 30.9 in adult    Pain in both knees    Arthritis of left hip     Past Medical History:   Diagnosis Date    GERD (gastroesophageal reflux disease)     Hypertension     Hypothyroidism     Osteoarthritis     LEFT HIP    Thyroid disorder      Past Surgical History:   Procedure Laterality Date    COLONOSCOPY      LEG SURGERY Right     plate and pins spring 2020    MOUTH SURGERY      HAD TOOTH IMPLANTED OVER A YEAR AGO    WRIST SURGERY      right arm and wrist plate and screws      PT Assessment (Last 12 Hours)       PT Evaluation and Treatment       Row Name 24 1100          Physical Therapy Time and Intention    Subjective Information no complaints  -LATASHA     Document Type evaluation  -LATASHA     Mode of Treatment individual therapy;physical therapy  -LATASHA     Patient Effort good  -LATASHA     Symptoms Noted During/After Treatment none  -LATASHA       Row Name 24 1100          General Information    Patient Observations alert;cooperative;agree to therapy  -LATASHA     Prior Level of Function independent:;all household mobility;community mobility  -LATASHA     Equipment Currently Used at Home none  -LATASHA      Existing Precautions/Restrictions fall;weight bearing  -LATASHA     Barriers to Rehab none identified  -LATASHA       Row Name 05/01/24 1100          Living Environment    Current Living Arrangements home  -LATASHA     People in Home spouse  -LATASHA       Row Name 05/01/24 1100          Cognition    Orientation Status (Cognition) oriented x 3  -LATASHA       Row Name 05/01/24 1100          Range of Motion Comprehensive    General Range of Motion lower extremity range of motion deficits identified  -LATASHA     Comment, General Range of Motion affected hip mildly limited due to pain  -LATASHA       Row Name 05/01/24 1100          Strength Comprehensive (MMT)    General Manual Muscle Testing (MMT) Assessment lower extremity strength deficits identified  LLE 4-/5  -LATASHA       Row Name 05/01/24 1100          Bed Mobility    Bed Mobility supine-sit;bed mobility (all) activities  -LATASHA     All Activities, [place] (Bed Mobility) contact guard  -LATASHA     Supine-Sit [place] (Bed Mobility) contact guard  -LATASHA       Row Name 05/01/24 1100          Transfers    Transfers bed-chair transfer;sit-stand transfer  -LATASHA       Row Name 05/01/24 1100          Bed-Chair Transfer    Bed-Chair [place] (Transfers) contact guard  -LATASHA     Assistive Device (Bed-Chair Transfers) walker, front-wheeled  -LATASHA       Row Name 05/01/24 1100          Sit-Stand Transfer    Sit-Stand [place] (Transfers) contact guard  -LATASHA     Assistive Device (Sit-Stand Transfers) walker, front-wheeled  -LATASHA       Row Name 05/01/24 1100          Gait/Stairs (Locomotion)    Gait/Stairs Locomotion gait/ambulation assistive device  -LATASHA     [place] Level (Gait) contact guard  -LATASHA     Assistive Device (Gait) walker, front-wheeled  -LATASHA     Patient was able to Ambulate yes  -LATASHA     Distance in Feet (Gait) 25  -LATASHA     Pattern (Gait) step-to  -LATASHA       Row Name 05/01/24 1100          Safety Issues, Functional Mobility    Impairments Affecting Function (Mobility) balance;pain;range of motion  (ROM);strength  -LATASHA       Row Name 05/01/24 1100          Balance    Balance Assessment standing dynamic balance  -LATASHA     Dynamic Standing Balance contact guard  -LATASHA     Position/Device Used, Standing Balance walker, front-wheeled  -LATASHA       Row Name             Wound 05/01/24 0750 Left anterior hip Incision    Wound - Properties Group Placement Date: 05/01/24  -DW Placement Time: 0750 -DW Side: Left  -DW Orientation: anterior  -DW Location: hip  -DW Primary Wound Type: Incision  -DW    Retired Wound - Properties Group Placement Date: 05/01/24  -DW Placement Time: 0750  -DW Side: Left  -DW Orientation: anterior  -DW Location: hip  -DW Primary Wound Type: Incision  -DW    Retired Wound - Properties Group Date first assessed: 05/01/24 -DW Time first assessed: 0750 -DW Side: Left  -DW Location: hip  -DW Primary Wound Type: Incision  -DW      Row Name 05/01/24 1100          Plan of Care Review    Plan of Care Reviewed With patient  -LATASHA     Outcome Evaluation Pt presents with decreased ROM, strength, transfers and ambulation.  Skilled PT services will be required to address these mobility deficits.  -LATASHA       Row Name 05/01/24 1100          Therapy Assessment/Plan (PT)    Patient/Family Therapy Goals Statement (PT) Pt wants to walk without pain  -LATASHA     Rehab Potential (PT) good, to achieve stated therapy goals  -LATASHA     Criteria for Skilled Interventions Met (PT) skilled treatment is necessary  -LATASHA     Therapy Frequency (PT) 2 times/day  -LATASHA     Predicted Duration of Therapy Intervention (PT) 10 days  -LATASHA     Problem List (PT) problems related to;balance;mobility;strength;pain  -LATASHA     Activity Limitations Related to Problem List (PT) unable to ambulate safely;unable to transfer safely  -LATASHA       Row Name 05/01/24 1100          PT Evaluation Complexity    History, PT Evaluation Complexity no personal factors and/or comorbidities  -LATASHA     Examination of Body Systems (PT Eval Complexity) total of 4 or more elements  -LATASHA      Clinical Presentation (PT Evaluation Complexity) stable  -LATASHA     Clinical Decision Making (PT Evaluation Complexity) low complexity  -LATASHA     Overall Complexity (PT Evaluation Complexity) low complexity  -LATASHA       Row Name 05/01/24 1100          Therapy Plan Review/Discharge Plan (PT)    Therapy Plan Review (PT) evaluation/treatment results reviewed;participants voiced agreement with care plan;participants included;patient  -LATASHA       Row Name 05/01/24 1100          Physical Therapy Goals    Transfer Goal Selection (PT) transfer, PT goal 1  -LATASHA     Gait Training Goal Selection (PT) gait training, PT goal 1  -LATASHA     Strength Goal Selection (PT) strength, PT goal 1  -LATASHA       Row Name 05/01/24 1100          Transfer Goal 1 (PT)    Activity/Assistive Device (Transfer Goal 1, PT) transfers, all  -LATASHA     [place] Level/Cues Needed (Transfer Goal 1, PT) independent  -LATASHA     Time Frame (Transfer Goal 1, PT) long term goal (LTG);10 days  -LATASHA       Row Name 05/01/24 1100          Gait Training Goal 1 (PT)    Activity/Assistive Device (Gait Training Goal 1, PT) gait (walking locomotion);walker, rolling  -LATASHA     [place] Level (Gait Training Goal 1, PT) independent  -LATASHA     Distance (Gait Training Goal 1, PT) 300  -LATASHA     Time Frame (Gait Training Goal 1, PT) long term goal (LTG);10 days  -LATASHA       Row Name 05/01/24 1100          Strength Goal 1 (PT)    Strength Goal 1 (PT) Pt will demonstrate 5/5 hip flexion strength on the affected side  -LATASHA     Time Frame (Strength Goal 1, PT) long term goal (LTG);10 days  -LATASHA               User Key  (r) = Recorded By, (t) = Taken By, (c) = Cosigned By      Initials Name Provider Type    LATASHAAmaury Samson, PT Physical Therapist    Charity Gilbert, RN Registered Nurse                    Physical Therapy Education       Title: PT OT SLP Therapies (Done)       Topic: Physical Therapy (Done)       Point: Mobility training (Done)       Learning Progress Summary              Patient Acceptance, E,TB, VU by LATASHA at 5/1/2024 1142                         Point: Precautions (Done)       Learning Progress Summary             Patient Acceptance, E,TB, VU by LATASHA at 5/1/2024 1142                                         User Key       Initials Effective Dates Name Provider Type Discipline    LATASHA 06/03/21 -  Amaury Samano PT Physical Therapist PT                  PT Recommendation and Plan  Anticipated Discharge Disposition (PT): home with outpatient therapy services  Planned Therapy Interventions (PT): balance training, bed mobility training, gait training, home exercise program, ROM (range of motion), stair training, strengthening, transfer training  Therapy Frequency (PT): 2 times/day  Plan of Care Reviewed With: patient  Outcome Evaluation: Pt presents with decreased ROM, strength, transfers and ambulation.  Skilled PT services will be required to address these mobility deficits.   Outcome Measures       Row Name 05/01/24 1100             How much help from another person do you currently need...    Turning from your back to your side while in flat bed without using bedrails? 3  -LATASHA      Moving from lying on back to sitting on the side of a flat bed without bedrails? 3  -LATASHA      Moving to and from a bed to a chair (including a wheelchair)? 3  -LATASHA      Standing up from a chair using your arms (e.g., wheelchair, bedside chair)? 3  -LATASHA      Climbing 3-5 steps with a railing? 3  -LATASHA      To walk in hospital room? 3  -LATASHA      AM-PAC 6 Clicks Score (PT) 18  -LATASHA      Highest Level of Mobility Goal 6 --> Walk 10 steps or more  -LATASHA         Functional Assessment    Outcome Measure Options AM-PAC 6 Clicks Basic Mobility (PT)  -LATASHA                User Key  (r) = Recorded By, (t) = Taken By, (c) = Cosigned By      Initials Name Provider Type    Amaury Ortiz PT Physical Therapist                     Time Calculation:    PT Charges       Row Name 05/01/24 1140             Time Calculation    PT  Received On 05/01/24  -LATASHA      PT Goal Re-Cert Due Date 05/10/24  -LATASHA         Untimed Charges    PT Eval/Re-eval Minutes 20  -LATASHA         Total Minutes    Untimed Charges Total Minutes 20  -LATASHA       Total Minutes 20  -LATASHA                User Key  (r) = Recorded By, (t) = Taken By, (c) = Cosigned By      Initials Name Provider Type    Amaury Ortiz, PT Physical Therapist                  Therapy Charges for Today       Code Description Service Date Service Provider Modifiers Qty    [id number] HC PT EVAL LOW COMPLEXITY 2 5/1/2024 Amaury Samano, PT GP 1            PT G-Codes  Outcome Measure Options: AM-PAC 6 Clicks Basic Mobility (PT)  AM-PAC 6 Clicks Score (PT): 18    Amaury Samano, REBEL  5/1/2024

## 2024-05-01 NOTE — THERAPY TREATMENT NOTE
Acute Care - Physical Therapy Treatment Note   [facility]     Patient Name: Marisa Uribe  : 1950  MRN: [id number]  Today's Date: 2024      Visit Dx:     ICD-10-CM ICD-9-CM   1. Difficulty in walking  R26.2 719.7   2. Primary osteoarthritis of left hip  M16.12 715.15   3. Impaired mobility and ADLs  Z74.09 V49.89    Z78.9      Patient Active Problem List   Diagnosis    Acquired hypothyroidism    Essential hypertension    Mixed hyperlipidemia    Muscle spasm    Osteoarthritis of left hip    Gastroesophageal reflux disease without esophagitis    Vitamin D deficiency    Chronic bilateral low back pain without sciatica    Class 1 obesity due to excess calories with serious comorbidity and body mass index (BMI) of 30.0 to 30.9 in adult    Pain in both knees    Arthritis of left hip     Past Medical History:   Diagnosis Date    GERD (gastroesophageal reflux disease)     Hypertension     Hypothyroidism     Osteoarthritis     LEFT HIP    Thyroid disorder      Past Surgical History:   Procedure Laterality Date    COLONOSCOPY      LEG SURGERY Right     plate and pins spring 2020    MOUTH SURGERY      HAD TOOTH IMPLANTED OVER A YEAR AGO    WRIST SURGERY      right arm and wrist plate and screws      PT Assessment (Last 12 Hours)       PT Evaluation and Treatment       Row Name 24 1433 24 1100       Physical Therapy Time and Intention    Subjective Information complains of;pain;fatigue (P)   -SM no complaints  -LATASHA    Document Type therapy note (daily note) (P)   -SM evaluation  -LATASHA    Mode of Treatment physical therapy;group therapy;individual therapy (P)   -SM individual therapy;physical therapy  -LATASHA    Patient Effort good (P)   -SM good  -LATASHA    Symptoms Noted During/After Treatment fatigue (P)   -SM none  -LATASHA      Row Name 24 1100          General Information    Patient Observations alert;cooperative;agree to therapy  -LATASHA     Prior Level of Function independent:;all household mobility;community  mobility  -LATASHA     Equipment Currently Used at Home none  -LATASHA     Existing Precautions/Restrictions fall;weight bearing  -LATASHA     Barriers to Rehab none identified  -LATASHA       Row Name 05/01/24 1100          Living Environment    Current Living Arrangements home  -LATASHA     People in Home spouse  -LATASHA       Row Name 05/01/24 1433          Pain    Additional Documentation Pain Scale: FACES Pre/Post-Treatment (Group) (P)   -       Row Name 05/01/24 1433          Pain Scale: FACES Pre/Post-Treatment    Pain: FACES Scale, Pretreatment 2-->hurts little bit (P)   -     Posttreatment Pain Rating 2-->hurts little bit (P)   -       Row Name 05/01/24 1100          Cognition    Orientation Status (Cognition) oriented x 3  -LATASHA       Row Name 05/01/24 1100          Range of Motion Comprehensive    General Range of Motion lower extremity range of motion deficits identified  -LATASHA     Comment, General Range of Motion affected hip mildly limited due to pain  -LATASHA       Row Name 05/01/24 1100          Strength Comprehensive (MMT)    General Manual Muscle Testing (MMT) Assessment lower extremity strength deficits identified  LLE 4-/5  -LATASHA       Row Name 05/01/24 1433          Mobility    Left Lower Extremity (Weight-bearing Status) weight-bearing as tolerated (WBAT) (P)   -       Row Name 05/01/24 1100          Bed Mobility    Bed Mobility supine-sit;bed mobility (all) activities  -LATASHA     All Activities, [place] (Bed Mobility) contact guard  -LATASHA     Supine-Sit [place] (Bed Mobility) contact guard  -LATASHA       Row Name 05/01/24 1433 05/01/24 1100       Transfers    Transfers stand-sit transfer (P)   - bed-chair transfer;sit-stand transfer  -LATASHA      Row Name 05/01/24 1100          Bed-Chair Transfer    Bed-Chair [place] (Transfers) contact guard  -LATASHA     Assistive Device (Bed-Chair Transfers) walker, front-wheeled  -LATASHA       Row Name 05/01/24 1433 05/01/24 1100       Sit-Stand Transfer    Sit-Stand [place] (Transfers)  standby assist (P)   - contact guard  -LATASHA    Assistive Device (Sit-Stand Transfers) walker, front-wheeled (P)   -SM walker, front-wheeled  -LATASHA      Row Name 05/01/24 1433          Stand-Sit Transfer    Stand-Sit [place] (Transfers) standby assist (P)   -     Assistive Device (Stand-Sit Transfers) walker, front-wheeled (P)   -SM       Row Name 05/01/24 1433 05/01/24 1100       Gait/Stairs (Locomotion)    Gait/Stairs Locomotion gait/ambulation assistive device (P)   -SM gait/ambulation assistive device  -LATASHA    [place] Level (Gait) contact guard;standby assist (P)   - contact guard  -LATASHA    Assistive Device (Gait) walker, front-wheeled (P)   -SM walker, front-wheeled  -LATASHA    Patient was able to Ambulate yes (P)   -SM yes  -LATASHA    Distance in Feet (Gait) 200 (P)   -SM 25  -LATASHA    Pattern (Gait) 3-point;step-through (P)   -SM step-to  -LATASHA    Deviations/Abnormal Patterns (Gait) base of support, wide;stride length decreased (P)   -SM --    Left Sided Gait Deviations weight shift ability decreased (P)   -SM --    Negotiation (Stairs) handrail location (P)   -SM --    [place] Level (Stairs) contact guard;verbal cues (P)   -SM --    Handrail Location (Stairs) both sides (P)   -SM --    Number of Steps (Stairs) 5 (P)   -SM --    Ascending Technique (Stairs) step-to-step (P)   -SM --    Descending Technique (Stairs) step-to-step (P)   -SM --      Row Name 05/01/24 1433 05/01/24 1100       Safety Issues, Functional Mobility    Impairments Affecting Function (Mobility) balance;endurance/activity tolerance;pain (P)   -SM balance;pain;range of motion (ROM);strength  -LATASHA      Row Name 05/01/24 1433 05/01/24 1100       Balance    Balance Assessment -- standing dynamic balance  -LATASHA    Dynamic Standing Balance contact guard (P)   - contact guard  -LATASHA    Position/Device Used, Standing Balance walker, front-wheeled (P)   -SM walker, front-wheeled  -LATASHA      Row Name 05/01/24 1432          Motor Skills    Therapeutic  Exercise hip;knee;ankle (P)   -       Row Name             Wound 05/01/24 0750 Left anterior hip Incision    Wound - Properties Group Placement Date: 05/01/24  -DW Placement Time: 0750 -DW Side: Left  -DW Orientation: anterior  -DW Location: hip  -DW Primary Wound Type: Incision  -DW    Retired Wound - Properties Group Placement Date: 05/01/24  -DW Placement Time: 0750  -DW Side: Left  -DW Orientation: anterior  -DW Location: hip  -DW Primary Wound Type: Incision  -DW    Retired Wound - Properties Group Date first assessed: 05/01/24  -DW Time first assessed: 0750 -DW Side: Left  -DW Location: hip  -DW Primary Wound Type: Incision  -DW      Row Name 05/01/24 1100          Plan of Care Review    Plan of Care Reviewed With patient  -LATASHA     Outcome Evaluation Pt presents with decreased ROM, strength, transfers and ambulation.  Skilled PT services will be required to address these mobility deficits.  -LATASHA       Row Name 05/01/24 1433          Vital Signs    O2 Delivery Intra Treatment room air (P)   -       Row Name 05/01/24 1433          Positioning and Restraints    In Chair reclined;call light within reach;encouraged to call for assist;exit alarm on;with family/caregiver (P)   -       Row Name 05/01/24 1100          Therapy Assessment/Plan (PT)    Patient/Family Therapy Goals Statement (PT) Pt wants to walk without pain  -LATASHA     Rehab Potential (PT) good, to achieve stated therapy goals  -LATASHA     Criteria for Skilled Interventions Met (PT) skilled treatment is necessary  -LATASHA     Therapy Frequency (PT) 2 times/day  -LATASHA     Predicted Duration of Therapy Intervention (PT) 10 days  -LATASHA     Problem List (PT) problems related to;balance;mobility;strength;pain  -LATASHA     Activity Limitations Related to Problem List (PT) unable to ambulate safely;unable to transfer safely  -LATASHA       Row Name 05/01/24 1100          PT Evaluation Complexity    History, PT Evaluation Complexity no personal factors and/or comorbidities  -LATASHA      Examination of Body Systems (PT Eval Complexity) total of 4 or more elements  -LATASHA     Clinical Presentation (PT Evaluation Complexity) stable  -LATASHA     Clinical Decision Making (PT Evaluation Complexity) low complexity  -LATASHA     Overall Complexity (PT Evaluation Complexity) low complexity  -LATASHA       Row Name 05/01/24 1433          Progress Summary (PT)    Progress Toward Functional Goals (PT) progress toward functional goals is good (P)   -SM       Row Name 05/01/24 1100          Therapy Plan Review/Discharge Plan (PT)    Therapy Plan Review (PT) evaluation/treatment results reviewed;participants voiced agreement with care plan;participants included;patient  -LATASHA       Row Name 05/01/24 1100          Physical Therapy Goals    Transfer Goal Selection (PT) transfer, PT goal 1  -LATASHA     Gait Training Goal Selection (PT) gait training, PT goal 1  -LATASHA     Strength Goal Selection (PT) strength, PT goal 1  -LATASHA       Row Name 05/01/24 1100          Transfer Goal 1 (PT)    Activity/Assistive Device (Transfer Goal 1, PT) transfers, all  -LATASHA     [place] Level/Cues Needed (Transfer Goal 1, PT) independent  -LATASHA     Time Frame (Transfer Goal 1, PT) long term goal (LTG);10 days  -LATASHA       Row Name 05/01/24 1100          Gait Training Goal 1 (PT)    Activity/Assistive Device (Gait Training Goal 1, PT) gait (walking locomotion);walker, rolling  -LATASHA     [place] Level (Gait Training Goal 1, PT) independent  -LATASHA     Distance (Gait Training Goal 1, PT) 300  -LATASHA     Time Frame (Gait Training Goal 1, PT) long term goal (LTG);10 days  -LATASHA       Row Name 05/01/24 1100          Strength Goal 1 (PT)    Strength Goal 1 (PT) Pt will demonstrate 5/5 hip flexion strength on the affected side  -LATASHA     Time Frame (Strength Goal 1, PT) long term goal (LTG);10 days  -LATASHA               User Key  (r) = Recorded By, (t) = Taken By, (c) = Cosigned By      Initials Name Provider Type    LATASHAAmaury Samson, PT Physical Therapist    WANDA Kumar,  Charity, RN Registered Nurse    Mari Feliciano, PTA Student PTA Student                Left Hip Ther -ex   Exercise  Reps  Sets    Long arc quads   10 2   Short arc quads  10 2   Heel slides  10 2   Ankle pumps  10 2   Quad sets  10 2   Glut sets  10 2   Abduction/ Adduction  10 2         Physical Therapy Education       Title: PT OT SLP Therapies (Done)       Topic: Physical Therapy (Done)       Point: Mobility training (Done)       Learning Progress Summary             Patient Acceptance, E,TB,D, VU,DU by  at 5/1/2024 1313    Acceptance, E,TB, VU by LATASHA at 5/1/2024 1142                         Point: Precautions (Done)       Learning Progress Summary             Patient Acceptance, E,TB,D, VU,DU by  at 5/1/2024 1313    Acceptance, E,TB, VU by LATASHA at 5/1/2024 1142                                         User Key       Initials Effective Dates Name Provider Type Discipline     06/16/21 -  Whit Justice OT Occupational Therapist OT    LATASHA 06/03/21 -  Amaury Samano PT Physical Therapist PT                  PT Recommendation and Plan     Progress Summary (PT)  Progress Toward Functional Goals (PT): (P) progress toward functional goals is good   Outcome Measures       Row Name 05/01/24 1400 05/01/24 1100          How much help from another person do you currently need...    Turning from your back to your side while in flat bed without using bedrails? 3 (P)   -SM 3  -LATASHA     Moving from lying on back to sitting on the side of a flat bed without bedrails? 3 (P)   -SM 3  -LATASHA     Moving to and from a bed to a chair (including a wheelchair)? 3 (P)   -SM 3  -LATASHA     Standing up from a chair using your arms (e.g., wheelchair, bedside chair)? 4 (P)   -SM 3  -LATASHA     Climbing 3-5 steps with a railing? 3 (P)   -SM 3  -LATASHA     To walk in hospital room? 3 (P)   -SM 3  -LATASHA     AM-PAC 6 Clicks Score (PT) 19 (P)   -SM 18  -LATASHA     Highest Level of Mobility Goal 6 --> Walk 10 steps or more (P)   -SM 6 --> Walk 10 steps or more   -LATASHA        Functional Assessment    Outcome Measure Options -- AM-PAC 6 Clicks Basic Mobility (PT)  -LATASHA               User Key  (r) = Recorded By, (t) = Taken By, (c) = Cosigned By      Initials Name Provider Type    Amaury Ortiz, PT Physical Therapist    Mari Feliciano, PTA Student PTA Student                     Time Calculation:    PT Charges       Row Name 05/01/24 1441 05/01/24 1140          Time Calculation    PT Received On 05/01/24 (P)   -SM 05/01/24  -LATASHA     PT Goal Re-Cert Due Date -- 05/10/24  -LATASHA        Timed Charges    32141 - Gait Training Minutes  10 (P)   - --     33280 - PT Therapeutic Activity Minutes 5 (P)   - --        Untimed Charges    PT Eval/Re-eval Minutes -- 20  -LATASHA     PT Group Therapy Minutes 25 (P)   - --        Total Minutes    Timed Charges Total Minutes 15 (P)   -SM --     Untimed Charges Total Minutes 25 (P)   - 20  -LATASHA      Total Minutes 40 (P)   - 20  -LATASHA               User Key  (r) = Recorded By, (t) = Taken By, (c) = Cosigned By      Initials Name Provider Type    Amaury Ortiz, PT Physical Therapist    Mari Feliciano, ANNEL Student PTA Student                  Therapy Charges for Today       Code Description Service Date Service Provider Modifiers Qty    57552160986 HC GAIT TRAINING EA 15 MIN 5/1/2024 Mari Fabian PTA Student GP 1    44998136040 HC PT THER PROC GROUP 5/1/2024 Mari Fabian PTA Student GP 1            PT G-Codes  Outcome Measure Options: AM-PAC 6 Clicks Daily Activity (OT), Optimal Instrument  AM-PAC 6 Clicks Score (PT): (P) 19  AM-PAC 6 Clicks Score (OT): 21    Mari Fabian PTA Student  5/1/2024

## 2024-05-01 NOTE — H&P
[facility]   HISTORY AND PHYSICAL    Patient Name: Marisa Uribe  : 1950  MRN: [id number]  Primary Care Physician:  Amy Goel DO  Date of admission: (Not on file)    Subjective   Subjective     Chief Complaint: Left hip pain    History of Present Illness: The patient is a 73-year-old female with chronic left hip pain.  X-rays reveal advanced degenerative changes to the left hip.  She reports pain and stiffness to the hip.  There is pain with activity and rest.  The symptoms have been persistent despite attempted conservative treatment.  The patient wishes to undergo operative treatment.    Review of Systems : Negative except for those mentioned in the history of present illness    Personal History     Past Medical History:   Diagnosis Date    GERD (gastroesophageal reflux disease)     Hypertension     Hypothyroidism     Osteoarthritis     LEFT HIP    Thyroid disorder        Past Surgical History:   Procedure Laterality Date    COLONOSCOPY      LEG SURGERY Right     plate and pins spring 2020    MOUTH SURGERY      HAD TOOTH IMPLANTED OVER A YEAR AGO    WRIST SURGERY      right arm and wrist plate and screws        Family History: family history includes Cancer in her mother; Diabetes in her father; Heart disease in her father; Mental illness in her brother; Thyroid disease in her mother. Otherwise pertinent FHx was reviewed and not pertinent to current issue.    Social History:  reports that she quit smoking about 42 years ago. Her smoking use included cigarettes. She started smoking about 57 years ago. She has a 30 pack-year smoking history. She has never used smokeless tobacco. She reports current alcohol use of about 1.0 standard drink of alcohol per week. She reports that she does not use drugs.    Home Medications:  Nutritional Supplements, amLODIPine, levothyroxine, losartan-hydrochlorothiazide, and omeprazole    Allergies:  No Known Allergies    Objective    Objective     Vitals:         Physical Exam  General: No apparent distress, alert and oriented x 3  HEENT: Normocephalic/atraumatic  Neck: Supple  Cardiovascular: Regular heart rate  Chest: Unlabored breathing  Abdomen: Soft, nontender nondistended  Musculoskeletal: Reduced range of motion of the left hip.  Positive pulses.  Tender to palpation to the hip.  Neurovascular intact to the extremity.  Neurological: Grossly intact    Result Review    Result Review:  I have personally reviewed the results from the time of this admission to 4/30/2024 21:51 EDT and agree with these findings:  [x]  Laboratory list / accordion  []  Microbiology  [x]  Radiology  []  EKG/Telemetry   []  Cardiology/Vascular   []  Pathology  []  Old records  []  Other:  Most notable findings include: Left hip x-ray: Advanced degenerative changes to the hip      Assessment & Plan   Assessment / Plan     Brief Patient Summary:  Marisa Uribe is a 73 y.o. female who has left hip osteoarthritis    Active Hospital Problems:  Active Hospital Problems    Diagnosis     **Osteoarthritis of left hip      Plan:   I discussed treatment options with the patient.  Operative versus nonoperative treatment was discussed.  Risks and benefits of surgery were discussed.  Informed consent was obtained and the patient wishes to proceed with left total hip arthroplasty.    DVT prophylaxis:  No DVT prophylaxis order currently exists.        CODE STATUS:       Admission Status:  I believe this patient meets outpatient status.    Bg Douglass MD

## 2024-05-02 VITALS
SYSTOLIC BLOOD PRESSURE: 123 MMHG | BODY MASS INDEX: 28.63 KG/M2 | TEMPERATURE: 97.5 F | RESPIRATION RATE: 16 BRPM | OXYGEN SATURATION: 97 % | HEART RATE: 64 BPM | WEIGHT: 161.6 LBS | DIASTOLIC BLOOD PRESSURE: 59 MMHG | HEIGHT: 63 IN

## 2024-05-02 LAB
ANION GAP SERPL CALCULATED.3IONS-SCNC: 11.4 MMOL/L (ref 5–15)
BUN SERPL-MCNC: 18 MG/DL (ref 8–23)
BUN/CREAT SERPL: 22.8 (ref 7–25)
CALCIUM SPEC-SCNC: 8.3 MG/DL (ref 8.6–10.5)
CHLORIDE SERPL-SCNC: 107 MMOL/L (ref 98–107)
CO2 SERPL-SCNC: 20.6 MMOL/L (ref 22–29)
CREAT SERPL-MCNC: 0.79 MG/DL (ref 0.57–1)
DEPRECATED RDW RBC AUTO: 40.4 FL (ref 37–54)
EGFRCR SERPLBLD CKD-EPI 2021: 79.6 ML/MIN/1.73
ERYTHROCYTE [DISTWIDTH] IN BLOOD BY AUTOMATED COUNT: 13 % (ref 12.3–15.4)
GLUCOSE SERPL-MCNC: 106 MG/DL (ref 65–99)
HCT VFR BLD AUTO: 26.8 % (ref 34–46.6)
HGB BLD-MCNC: 9 G/DL (ref 12–15.9)
MCH RBC QN AUTO: 28.8 PG (ref 26.6–33)
MCHC RBC AUTO-ENTMCNC: 33.6 G/DL (ref 31.5–35.7)
MCV RBC AUTO: 85.9 FL (ref 79–97)
PLATELET # BLD AUTO: 163 10*3/MM3 (ref 140–450)
PMV BLD AUTO: 9.4 FL (ref 6–12)
POTASSIUM SERPL-SCNC: 3.6 MMOL/L (ref 3.5–5.2)
RBC # BLD AUTO: 3.12 10*6/MM3 (ref 3.77–5.28)
SODIUM SERPL-SCNC: 139 MMOL/L (ref 136–145)
WBC NRBC COR # BLD AUTO: 8.69 10*3/MM3 (ref 3.4–10.8)

## 2024-05-02 PROCEDURE — 97116 GAIT TRAINING THERAPY: CPT

## 2024-05-02 PROCEDURE — A9270 NON-COVERED ITEM OR SERVICE: HCPCS | Performed by: ORTHOPAEDIC SURGERY

## 2024-05-02 PROCEDURE — 63710000001 APIXABAN 2.5 MG TABLET: Performed by: ORTHOPAEDIC SURGERY

## 2024-05-02 PROCEDURE — 25010000002 KETOROLAC TROMETHAMINE PER 15 MG: Performed by: ORTHOPAEDIC SURGERY

## 2024-05-02 PROCEDURE — 63710000001 LEVOTHYROXINE 112 MCG TABLET: Performed by: PHYSICIAN ASSISTANT

## 2024-05-02 PROCEDURE — 63710000001 FERROUS SULFATE 325 (65 FE) MG TABLET: Performed by: ORTHOPAEDIC SURGERY

## 2024-05-02 PROCEDURE — 85027 COMPLETE CBC AUTOMATED: CPT | Performed by: PHYSICIAN ASSISTANT

## 2024-05-02 PROCEDURE — 94799 UNLISTED PULMONARY SVC/PX: CPT

## 2024-05-02 PROCEDURE — 63710000001 HYDROCODONE-ACETAMINOPHEN 7.5-325 MG TABLET: Performed by: ORTHOPAEDIC SURGERY

## 2024-05-02 PROCEDURE — 97150 GROUP THERAPEUTIC PROCEDURES: CPT

## 2024-05-02 PROCEDURE — 80048 BASIC METABOLIC PNL TOTAL CA: CPT | Performed by: ORTHOPAEDIC SURGERY

## 2024-05-02 PROCEDURE — 63710000001 PANTOPRAZOLE 40 MG TABLET DELAYED-RELEASE: Performed by: PHYSICIAN ASSISTANT

## 2024-05-02 PROCEDURE — A9270 NON-COVERED ITEM OR SERVICE: HCPCS | Performed by: PHYSICIAN ASSISTANT

## 2024-05-02 PROCEDURE — 97535 SELF CARE MNGMENT TRAINING: CPT

## 2024-05-02 PROCEDURE — 99213 OFFICE O/P EST LOW 20 MIN: CPT | Performed by: PHYSICIAN ASSISTANT

## 2024-05-02 RX ORDER — HYDROCODONE BITARTRATE AND ACETAMINOPHEN 7.5; 325 MG/1; MG/1
1-2 TABLET ORAL EVERY 4 HOURS PRN
Qty: 40 TABLET | Refills: 0 | Status: SHIPPED | OUTPATIENT
Start: 2024-05-02 | End: 2024-05-07 | Stop reason: SDUPTHER

## 2024-05-02 RX ORDER — NALOXONE HYDROCHLORIDE 4 MG/.1ML
SPRAY NASAL
Qty: 2 EACH | Refills: 0 | Status: SHIPPED | OUTPATIENT
Start: 2024-05-02

## 2024-05-02 RX ORDER — ASPIRIN 325 MG
325 TABLET, DELAYED RELEASE (ENTERIC COATED) ORAL DAILY
Qty: 21 TABLET | Refills: 0 | Status: SHIPPED | OUTPATIENT
Start: 2024-05-10

## 2024-05-02 RX ADMIN — PANTOPRAZOLE SODIUM 40 MG: 40 TABLET, DELAYED RELEASE ORAL at 05:55

## 2024-05-02 RX ADMIN — HYDROCODONE BITARTRATE AND ACETAMINOPHEN 1 TABLET: 7.5; 325 TABLET ORAL at 11:55

## 2024-05-02 RX ADMIN — LEVOTHYROXINE SODIUM 112 MCG: 112 TABLET ORAL at 05:55

## 2024-05-02 RX ADMIN — APIXABAN 2.5 MG: 2.5 TABLET, FILM COATED ORAL at 08:17

## 2024-05-02 RX ADMIN — HYDROCODONE BITARTRATE AND ACETAMINOPHEN 1 TABLET: 7.5; 325 TABLET ORAL at 04:18

## 2024-05-02 RX ADMIN — KETOROLAC TROMETHAMINE 15 MG: 15 INJECTION, SOLUTION INTRAMUSCULAR; INTRAVENOUS at 05:55

## 2024-05-02 RX ADMIN — FERROUS SULFATE TAB 325 MG (65 MG ELEMENTAL FE) 325 MG: 325 (65 FE) TAB at 08:17

## 2024-05-02 RX ADMIN — Medication 3 ML: at 08:22

## 2024-05-02 NOTE — SIGNIFICANT NOTE
05/02/24 0752   Plan   Final Discharge Disposition Code 01 - home or self-care   Final Note PTA Maximiliano. Appt: 5/3/24 at 9AM.

## 2024-05-02 NOTE — THERAPY TREATMENT NOTE
Acute Care - Physical Therapy Treatment Note   [place]     Patient Name: Marisa Uribe  : 1950  MRN: [id number]  Today's Date: 2024      Visit Dx:     ICD-10-CM ICD-9-CM   1. Difficulty in walking  R26.2 719.7   2. Primary osteoarthritis of left hip  M16.12 715.15   3. Impaired mobility and ADLs  Z74.09 V49.89    Z78.9      Patient Active Problem List   Diagnosis    Acquired hypothyroidism    Essential hypertension    Mixed hyperlipidemia    Muscle spasm    Osteoarthritis of left hip    Gastroesophageal reflux disease without esophagitis    Vitamin D deficiency    Chronic bilateral low back pain without sciatica    Class 1 obesity due to excess calories with serious comorbidity and body mass index (BMI) of 30.0 to 30.9 in adult    Pain in both knees    Arthritis of left hip     Past Medical History:   Diagnosis Date    GERD (gastroesophageal reflux disease)     Hypertension     Hypothyroidism     Osteoarthritis     LEFT HIP    Thyroid disorder      Past Surgical History:   Procedure Laterality Date    COLONOSCOPY      LEG SURGERY Right     plate and pins spring 2020    MOUTH SURGERY      HAD TOOTH IMPLANTED OVER A YEAR AGO    TOTAL HIP ARTHROPLASTY Left 2024    Procedure: LEFT TOTAL HIP ARTHROPLASTY;  Surgeon: Bg Douglass MD;  [facility]  Service: Orthopedics;  Laterality: Left;    WRIST SURGERY      right arm and wrist plate and screws      PT Assessment (Last 12 Hours)       PT Evaluation and Treatment       Row Name 24 6462          Physical Therapy Time and Intention    Subjective Information complains of;pain  -RH     Document Type therapy note (daily note)  -     Mode of Treatment individual therapy;group therapy;physical therapy  -RH     Patient Effort good  -     Comment Gait performed individually; therapuetic exercises performed in a group session with 3 participants  -       Row Name 24 4102          General Information    Patient Observations  alert;cooperative;agree to therapy  -[facility] Name 05/02/24 1345          Pain    Posttreatment Pain Rating 3/10  -     Pain Location - Side/Orientation Left  -RH     Pain Location - hip  -RH     Pain Intervention(s) Emotional support;Nursing Notified  -       Row Name 05/02/24 1345          Range of Motion (ROM)    Range of Motion --  Pt L hip AAROM at 90 degrees flex and 20 degrees abd.  -[facility] Name 05/02/24 1345          Strength (Manual Muscle Testing)    Strength (Manual Muscle Testing) --  Pt L hip flex strength at 3-/5.  -[facility] Name 05/02/24 1345          Mobility    Extremity Weight-bearing Status left lower extremity  -     Left Lower Extremity (Weight-bearing Status) weight-bearing as tolerated (WBAT)  -[facility] Name 05/02/24 1345          Transfers    Transfers sit-stand transfer;stand-sit transfer  -[facility] Name 05/02/24 1345          Sit-Stand Transfer    Sit-Stand [place] (Transfers) contact guard  -     Assistive Device (Sit-Stand Transfers) walker, front-wheeled  -[facility] Name 05/02/24 1345          Stand-Sit Transfer    Stand-Sit [place] (Transfers) contact guard  -     Assistive Device (Stand-Sit Transfers) walker, front-wheeled  -[facility] Name 05/02/24 1345          Gait/Stairs (Locomotion)    Gait/Stairs Locomotion gait/ambulation assistive device  -     [place] Level (Gait) contact guard  -     Assistive Device (Gait) walker, front-wheeled  -     Patient was able to Ambulate yes  -RH     Distance in Feet (Gait) 125  -     Pattern (Gait) --  Pt able to achieve and maintain reciprocal gait.  -     Deviations/Abnormal Patterns (Gait) gait speed decreased;stride length decreased  -     Gait Assessment/Intervention Pt amb with RW and CGA with reciprocal gait with decreased gait speed and stride length.  -     Negotiation (Stairs) stairs independence;stairs assistive device;handrail location;number of steps;ascending  technique;descending technique  -     [place] Level (Stairs) contact guard  -     Handrail Location (Stairs) both sides  -     Number of Steps (Stairs) 5 x 2  -RH     Ascending Technique (Stairs) step-to-step  -     Descending Technique (Stairs) step-to-step  -       Row Name 05/02/24 1345          Safety Issues, Functional Mobility    Impairments Affecting Function (Mobility) balance;pain;range of motion (ROM);strength  -       Row Name 05/02/24 1345          Balance    Balance Assessment standing dynamic balance  -     Dynamic Standing Balance contact guard  -     Position/Device Used, Standing Balance walker, front-wheeled  -       Row Name 05/02/24 1345          Motor Skills    Therapeutic Exercise hip;knee;ankle  -       Row Name 05/02/24 1345          Hip (Therapeutic Exercise)    Hip (Therapeutic Exercise) isometric exercises  -     Hip Isometrics (Therapeutic Exercise) left;gluteal sets;aBduction;10 repetitions;2 sets  -       Row Name 05/02/24 1345          Knee (Therapeutic Exercise)    Knee (Therapeutic Exercise) strengthening exercise;isometric exercises  -     Knee Isometrics (Therapeutic Exercise) left;quad sets;10 repetitions;2 sets  -     Knee Strengthening (Therapeutic Exercise) left;heel slides;SAQ (short arc quad);LAQ (long arc quad);10 repetitions;2 sets  -       Row Name 05/02/24 1345          Ankle (Therapeutic Exercise)    Ankle (Therapeutic Exercise) AROM (active range of motion)  -     Ankle AROM (Therapeutic Exercise) left;dorsiflexion;plantarflexion;10 repetitions;2 sets  -       Row Name             Wound 05/01/24 0750 Left anterior hip Incision    Wound - Properties Group Placement Date: 05/01/24 -DW Placement Time: 0750 -DW Side: Left  -DW Orientation: anterior  -DW Location: hip  -DW Primary Wound Type: Incision  -DW    Retired Wound - Properties Group Placement Date: 05/01/24 -DW Placement Time: 0750 -DW Side: Left  -DW Orientation: anterior   -DW Location: hip  -DW Primary Wound Type: Incision  -DW    Retired Wound - Properties Group Date first assessed: 05/01/24  -DW Time first assessed: 0750  -DW Side: Left  -DW Location: hip  -DW Primary Wound Type: Incision  -DW      Row Name 05/02/24 1345          Progress Summary (PT)    Progress Toward Functional Goals (PT) progress toward functional goals is good  -RH     Daily Progress Summary (PT) Pt is progressing well with their exercise program.  Will continue current plan of care.  -               User Key  (r) = Recorded By, (t) = Taken By, (c) = Cosigned By      Initials Name Provider Type     Poncho Caballero, ANNEL Physical Therapist Assistant    Charity Gilbert, RN Registered Nurse                    Physical Therapy Education       Title: PT OT SLP Therapies (Resolved)       Topic: Physical Therapy (Resolved)       Point: Mobility training (Resolved)       Learning Progress Summary             Patient Acceptance, E,TB,D, VU,DU by  at 5/1/2024 1313    Acceptance, E,TB, VU by LATASHA at 5/1/2024 1142                         Point: Precautions (Resolved)       Learning Progress Summary             Patient Acceptance, E,TB,D, VU,DU by  at 5/1/2024 1313    Acceptance, E,TB, VU by LATASHA at 5/1/2024 1142                                         User Key       Initials Effective Dates Name Provider Type Discipline     06/16/21 -  Whit Justice, OT Occupational Therapist OT    LATASHA 06/03/21 -  Amaury Samano PT Physical Therapist PT                  PT Recommendation and Plan     Progress Summary (PT)  Progress Toward Functional Goals (PT): progress toward functional goals is good  Daily Progress Summary (PT): Pt is progressing well with their exercise program.  Will continue current plan of care.   Outcome Measures       Row Name 05/02/24 1300 05/01/24 1400 05/01/24 1100       How much help from another person do you currently need...    Turning from your back to your side while in flat bed without using  bedrails? 3  -RH 3  -LATASHA (r) SM (t) LATASHA (c) 3  -LATASHA    Moving from lying on back to sitting on the side of a flat bed without bedrails? 3  -RH 3  -LATASHA (r) SM (t) LATASHA (c) 3  -LATASHA    Moving to and from a bed to a chair (including a wheelchair)? 3  -RH 3  -LATASHA (r) SM (t) LATASHA (c) 3  -LATASHA    Standing up from a chair using your arms (e.g., wheelchair, bedside chair)? 4  -RH 4  -LATASHA (r) SM (t) LATASHA (c) 3  -LATASHA    Climbing 3-5 steps with a railing? 4  -RH 3  -LATASHA (r) SM (t) LATASHA (c) 3  -LATASHA    To walk in hospital room? 4  -RH 3  -LATASHA (r) SM (t) LATASHA (c) 3  -LATASHA    AM-PAC 6 Clicks Score (PT) 21  -RH 19  -LATASHA (r) SM (t) 18  -LATASHA    Highest Level of Mobility Goal 6 --> Walk 10 steps or more  -RH 6 --> Walk 10 steps or more  -LATASHA (r) SM (t) 6 --> Walk 10 steps or more  -LATASHA       Functional Assessment    Outcome Measure Options -- -- AM-PAC 6 Clicks Basic Mobility (PT)  -LATASHA              User Key  (r) = Recorded By, (t) = Taken By, (c) = Cosigned By      Initials Name Provider Type     Poncho Caballero, ANNEL Physical Therapist Assistant    Amaury Ortiz, PT Physical Therapist    Mari Feliciano PTA Student PTA Student                     Time Calculation:    PT Charges       Row Name 05/02/24 1345             Time Calculation    PT Received On 05/02/24  -RH         Timed Charges    86487 - Gait Training Minutes  9  -RH      64647 - PT Therapeutic Activity Minutes 4  -RH         Untimed Charges    PT Group Therapy Minutes 25  -RH         Total Minutes    Timed Charges Total Minutes 13  -RH      Untimed Charges Total Minutes 25  -RH       Total Minutes 38  -RH                User Key  (r) = Recorded By, (t) = Taken By, (c) = Cosigned By      Initials Name Provider Type     Poncho Caballero, ANNEL Physical Therapist Assistant                  Therapy Charges for Today       Code Description Service Date Service Provider Modifiers Qty    25434400610  GAIT TRAINING EA 15 MIN 5/2/2024 Poncho Caballero PTA GP 1    [id number] HC PT THER PROC GROUP 5/2/2024  Poncho Caballero, PTA GP 1            PT G-Codes  Outcome Measure Options: AM-PAC 6 Clicks Daily Activity (OT)  AM-PAC 6 Clicks Score (PT): 21  AM-PAC 6 Clicks Score (OT): 24    Poncho Caballero PTA  5/2/2024

## 2024-05-02 NOTE — THERAPY TREATMENT NOTE
Patient Name: Marisa Uribe  : 1950    MRN: [id number]                              Today's Date: 2024       Admit Date: 2024    Visit Dx:     ICD-10-CM ICD-9-CM   1. Difficulty in walking  R26.2 719.7   2. Primary osteoarthritis of left hip  M16.12 715.15   3. Impaired mobility and ADLs  Z74.09 V49.89    Z78.9      Patient Active Problem List   Diagnosis    Acquired hypothyroidism    Essential hypertension    Mixed hyperlipidemia    Muscle spasm    Osteoarthritis of left hip    Gastroesophageal reflux disease without esophagitis    Vitamin D deficiency    Chronic bilateral low back pain without sciatica    Class 1 obesity due to excess calories with serious comorbidity and body mass index (BMI) of 30.0 to 30.9 in adult    Pain in both knees    Arthritis of left hip     Past Medical History:   Diagnosis Date    GERD (gastroesophageal reflux disease)     Hypertension     Hypothyroidism     Osteoarthritis     LEFT HIP    Thyroid disorder      Past Surgical History:   Procedure Laterality Date    COLONOSCOPY      LEG SURGERY Right     plate and pins spring 2020    MOUTH SURGERY      HAD TOOTH IMPLANTED OVER A YEAR AGO    TOTAL HIP ARTHROPLASTY Left 2024    Procedure: LEFT TOTAL HIP ARTHROPLASTY;  Surgeon: Bg Douglass MD;  Location: [facility] OR;  Service: Orthopedics;  Laterality: Left;    WRIST SURGERY      right arm and wrist plate and screws       General Information       Row Name 24 1015          OT Time and Intention    Document Type therapy note (daily note) (P)   -KH     Mode of Treatment individual therapy;occupational therapy (P)   -       Row Name 24 1015          General Information    Patient Profile Reviewed yes (P)   -KH     Existing Precautions/Restrictions fall;weight bearing (P)   LLE WBAT  -KH     Barriers to Rehab none identified (P)   -KH       Row Name 24 1015          Cognition    Orientation Status (Cognition) oriented x 4 (P)   -KH                User Key  (r) = Recorded By, (t) = Taken By, (c) = Cosigned By      Initials Name Provider Type     Freida Proctor, OT Student OT Student                     Mobility/ADL's       [facility] Name 05/02/24 1016          Transfers    Transfers stand-sit transfer;sit-stand transfer (P)   -KH       Row Name 05/02/24 1016          Bed-Chair Transfer    Assistive Device (Bed-Chair Transfers) --  -ES       Row Name 05/02/24 1016          Sit-Stand Transfer    Sit-Stand [place] (Transfers) supervision (P)   -     Assistive Device (Sit-Stand Transfers) walker, front-wheeled (P)   -[facility] Name 05/02/24 1016          Stand-Sit Transfer    Stand-Sit [place] (Transfers) supervision (P)   -     Assistive Device (Stand-Sit Transfers) walker, front-wheeled (P)   -KH       Row Name 05/02/24 1016          Functional Mobility    Functional Mobility- Ind. Level 1 person;supervision required (P)   -     Functional Mobility- Device walker, front-wheeled (P)   -     Functional Mobility- Comment Pt used RW to mobilize within the room from the chair to sink for ADLs and back to chair. (P)   -[facility] Name 05/02/24 1016          Activities of Daily Living    BADL Assessment/Intervention upper body dressing;lower body dressing;grooming (P)   Pt educated on hip precautions, no hip extension on surgery side and no hip adduction past midline on surgery side.  -KH       Row Name 05/02/24 1016          Mobility    Extremity Weight-bearing Status left lower extremity (P)   -     Left Lower Extremity (Weight-bearing Status) weight-bearing as tolerated (WBAT) (P)   -KH       Row Name 05/02/24 1016          Upper Body Dressing Assessment/Training    [place] Level (Upper Body Dressing) upper body dressing skills;modified independence;doff;pajama/robe;don;bra/undergarment;pull-over garment (P)   -     Position (Upper Body Dressing) supported standing (P)   -     Comment, (Upper Body Dressing) Pt used RW for  stabilization while standing for UB dressing (P)   -       Row Name 05/02/24 1016          Lower Body Dressing Assessment/Training    [place] Level (Lower Body Dressing) lower body dressing skills;don;pants/bottoms (P)   -     Position (Lower Body Dressing) supported standing;unsupported sitting (P)   -     Comment, (Lower Body Dressing) Pt used RW for stabilization while standing for LB dressing. Pt educated on adaptive LB dressing strategy. (P)   -       Row Name 05/02/24 1016          Grooming Assessment/Training    [place] Level (Grooming) grooming skills;wash face, hands;hair care, combing/brushing;oral care regimen;modified independence (P)   -     Position (Grooming) sink side;supported standing (P)   -     Comment, (Grooming) Pt used RW for stabilization while standing for grooming tasks (P)   -               User Key  (r) = Recorded By, (t) = Taken By, (c) = Cosigned By      Initials Name Provider Type    Latisha Bruce, OTR/L, CSRS Occupational Therapist    Freida Lira, OT Student OT Student                   Obj/Interventions       Row Name 05/02/24 1025          Motor Skills    Functional Endurance F+, pt completed ADLs in standing with no fatigue reported at end of session. (P)   -       Row Name 05/02/24 1025          Balance    Balance Assessment sitting dynamic balance;standing dynamic balance (P)   -     Dynamic Sitting Balance independent (P)   -     Position, Sitting Balance sitting in chair (P)   -     Dynamic Standing Balance modified independence;standby assist (P)   -     Position/Device Used, Standing Balance walker, front-wheeled (P)   -               User Key  (r) = Recorded By, (t) = Taken By, (c) = Cosigned By      Initials Name Provider Type    Freida Lira, OT Student OT Student                   Goals/Plan    No documentation.                  Clinical Impression       Row Name 05/02/24 1026          Pain Assessment    Pretreatment  Pain Rating 0/10 - no pain (P)   -KH     Posttreatment Pain Rating 0/10 - no pain (P)   -KH       Row Name 05/02/24 1026          Plan of Care Review    Progress improving (P)   -KH     Outcome Evaluation Pt presents with no deficits requiring skilled OT services at this time. Pt demonstrated ability to complete BADLs and transfers within baseline independence. Pt has been educated on adpative dressing strategies and hip precautions. OT will sign off at this time. (P)   -       Row Name 05/02/24 1026          Positioning and Restraints    Pre-Treatment Position sitting in chair/recliner (P)   -KH     Post Treatment Position chair (P)   -KH     In Chair reclined;call light within reach;exit alarm on;encouraged to call for assist (P)   -               User Key  (r) = Recorded By, (t) = Taken By, (c) = Cosigned By      Initials Name Provider Type    Freida Lira, OT Student OT Student                   Outcome Measures       Row Name 05/02/24 1029          How much help from another is currently needed...    Putting on and taking off regular lower body clothing? 4 (P)   -KH     Bathing (including washing, rinsing, and drying) 4 (P)   -KH     Toileting (which includes using toilet bed pan or urinal) 4 (P)   -KH     Putting on and taking off regular upper body clothing 4 (P)   -KH     Taking care of personal grooming (such as brushing teeth) 4 (P)   -KH     Eating meals 4 (P)   -KH     AM-PAC 6 Clicks Score (OT) 24 (P)   -       Row Name 05/02/24 0900          How much help from another person do you currently need...    Turning from your back to your side while in flat bed without using bedrails? 3  -JG     Moving from lying on back to sitting on the side of a flat bed without bedrails? 3  -JG     Moving to and from a bed to a chair (including a wheelchair)? 3  -JG     Standing up from a chair using your arms (e.g., wheelchair, bedside chair)? 4  -JG     Climbing 3-5 steps with a railing? 3  -JG     To  walk in hospital room? 3  -JG     AM-PAC 6 Clicks Score (PT) 19  -J     Highest Level of Mobility Goal 6 --> Walk 10 steps or more  -       Row Name 05/02/24 1029          Functional Assessment    Outcome Measure Options AM-PAC 6 Clicks Daily Activity (OT) (P)   -               User Key  (r) = Recorded By, (t) = Taken By, (c) = Cosigned By      Initials Name Provider Type    Coni Miles, RN Registered Nurse    Freida Lira, OT Student OT Student                      OT Recommendation and Plan     Plan of Care Review  Progress: (P) improving  Outcome Evaluation: (P) Pt presents with no deficits requiring skilled OT services at this time. Pt demonstrated ability to complete BADLs and transfers within baseline independence. Pt has been educated on adpative dressing strategies and hip precautions. OT will sign off at this time.     Time Calculation:         Time Calculation- OT       Row Name 05/02/24 1032             Time Calculation- OT    OT Received On 05/02/24 (P)   -      OT Goal Re-Cert Due Date 05/10/24 (P)   -         Timed Charges    89051 - OT Self Care/Mgmt Minutes 24 (P)   -         Total Minutes    Timed Charges Total Minutes 24 (P)   -       Total Minutes 24 (P)   -                User Key  (r) = Recorded By, (t) = Taken By, (c) = Cosigned By      Initials Name Provider Type    Freida Lira, OT Student OT Student                           Freida Proctor OT Student  5/2/2024

## 2024-05-02 NOTE — PLAN OF CARE
Goal Outcome Evaluation:  Plan of Care Reviewed With: patient           Outcome Evaluation: Pt alert and oriented, VSS, pain will controlled with PRN pain meds. Pt urinating without difficulty. Pt one person assist with gait belt and rolling walker for transfers. Pt to d/c home with spouse as transport, has needed DME. Pt will be utilizing meds to bed for discharge medications and has outpatient therapy scheduled.

## 2024-05-02 NOTE — PROGRESS NOTES
[facility]   Hospitalist Progress Note       Patient Name: Marisa Uribe  : 1950  MRN: [id number]  Primary Care Physician: Amy Goel DO  Date of admission: 2024  Today's Date: 2024  Room / Bed:   237/1  Subjective   Chief Complaint: Medical management     HPI:     Marisa Uribe is a 73 y.o. female who is being seen by hospitalist for general medical management of chronic medical issues including GERD, HTN, hypothyroidism, dyslipidemia?, vitamin D deficiency, and osteoarthritis left hip.  She had left hip replacement 24 by Dr. Douglass.  Home medications reviewed and include Prilosec, multivitamin, losartan-HCTZ, levothyroxine, amlodipine.  Recent lab work reviewed and includes creatinine 0.8, potassium 3.9, A1c 4.9, TSH elevated 8.0, T4 elevated 1.7, WBC 6.1, Hgb 12.6, platelets 239.    Interval Followup: 2024    Rested reasonably well last night.  Had nausea yesterday evening.  No nausea or vomiting today.  Tolerated breakfast/oral intake.  Left hip pain reasonably controlled  Blood pressures soft: 109///70.  Patient denies any palpitations, lightheadedness, syncope.  No chest pains or shortness of air.  On room air  Eager to return home      REVIEW OF SYSTEMS:   Left hip pain reasonably controlled  Objective   Temp:  [97.2 °F (36.2 °C)-98.4 °F (36.9 °C)] 97.7 °F (36.5 °C)  Heart Rate:  [52-66] 59  Resp:  [16] 16  BP: (109-124)/(62-77) 124/70  PHYSICAL EXAM   CON: WN. WD. NAD.   NECK:  No stridor.   RESP:  No wheezes.  Breathing unlabored.     EXT: LLE intact neurovascularly  PSYCH:  Alert. Oriented. Normal affect and mood.  NEURO:  No dysarthria or aphasia.   Results from last 7 days   Lab Units 24  0318 24  1015   WBC 10*3/mm3 8.69  --    HEMOGLOBIN g/dL 9.0* 11.4*   HEMATOCRIT % 26.8* 34.6   PLATELETS 10*3/mm3 163  --      Results from last 7 days   Lab Units 24  0318   SODIUM mmol/L 139   POTASSIUM mmol/L 3.6   CO2 mmol/L 20.6*   CHLORIDE  mmol/L 107   ANION GAP mmol/L 11.4   BUN mg/dL 18   CREATININE mg/dL 0.79   GLUCOSE mg/dL 106*         COMPLEXITY OF DATA / DECISION MAKING     []  Moderate: One acute illness or mild exacerbation of chronic or 2 stable chronic or tx side effects   []  High:  Severe acute illness or severe exacerbation of chronic - potential for major debility / life threatening         I have personally reviewed the results from the time of this admission to 5/2/2024 09:12 EDT:  []  Laboratory:  []  Microbiology: []  Radiology:  []  Telemetry:   []  Cardiology/Vascular:  []  Pathology:  []  Prior external records:  []  Independent historian provided additional details:      []  Discussed case with specialists:    []  Independent interpretation of ECG/Imaging etc:             []  Moderate: Rx management, low risk surgery, suboptimal social situation   []  High:  Rx with close monitoring for toxicity, mod-high risk surgery, DNR decision, Comfort initiated, IV pain meds    Assessment / Plan   Assessment:     Medical management  HTN  Dyslipidemia  Hypothyroidism  GERD  OA left hip  Left hip replacement 5/1/24 by Dr. Douglass     Plan:     Resume home losartan/hydrochlorothiazide and amlodipine in 48 hours.  Monitor blood pressure daily and discuss readings with PCP at routine follow-up.  Monitor for any signs of lightheadedness/dizziness.    Physical therapy recommendations per orthopedist  Pain med Rx per orthopedist  DVT prophylaxis per orthopedist  DVT prophylaxis:  Medical and mechanical DVT prophylaxis orders are present.      CODE STATUS:      Code Status (Patient has no pulse and is not breathing): CPR (Attempt to Resuscitate)  Medical Interventions (Patient has pulse or is breathing): Full Support       Electronically signed by ZENOBIA Vivas, 05/02/24, 9:12 AM EDT.

## 2024-05-02 NOTE — PLAN OF CARE
Goal Outcome Evaluation:              Outcome Evaluation: Pt alert and able to make needs known. Pain controlled with scheduled Toradol and PRN Norco. Pt urinating without difficulty. Pt one person assist with gait belt and rolling walker for transfers, ambulated 310+ feet- tolerated well. Pt plan to d/c home with spouse as transport, has needed DME, will be utilizing meds to bed for discharge medications and has outpatient therapy scheduled.

## 2024-05-02 NOTE — PROGRESS NOTES
[facility]     Progress Note    Patient Name: Marisa Uribe  : 1950  MRN: [id number]  Primary Care Physician:  Amy Goel DO  Date of admission: 2024    Subjective   Subjective     HPI:  Patient Reports doing well this morning.  Her pain is controlled.  She denies any chest pain or shortness of air.    Review of Systems   See HPI    Objective   Objective     Vitals:   Temp:  [97.2 °F (36.2 °C)-98.4 °F (36.9 °C)] 97.7 °F (36.5 °C)  Heart Rate:  [52-76] 59  Resp:  [15-16] 16  BP: (109-146)/(62-92) 124/70  Flow (L/min):  [6] 6  Physical Exam    General: Alert, no acute distress   Chest: Unlabored breathing, cardiovascular: Regular heart rate   Musculoskeletal: Dressing intact.  Equal leg lengths.  Positive pulses.  Neurovascular intact.    Result Review      WBC   Date Value Ref Range Status   2024 8.69 3.40 - 10.80 10*3/mm3 Final     RBC   Date Value Ref Range Status   2024 3.12 (L) 3.77 - 5.28 10*6/mm3 Final     Hemoglobin   Date Value Ref Range Status   2024 9.0 (L) 12.0 - 15.9 g/dL Final     Hematocrit   Date Value Ref Range Status   2024 26.8 (L) 34.0 - 46.6 % Final     MCV   Date Value Ref Range Status   2024 85.9 79.0 - 97.0 fL Final     MCH   Date Value Ref Range Status   2024 28.8 26.6 - 33.0 pg Final     MCHC   Date Value Ref Range Status   2024 33.6 31.5 - 35.7 g/dL Final     RDW   Date Value Ref Range Status   2024 13.0 12.3 - 15.4 % Final     RDW-SD   Date Value Ref Range Status   2024 40.4 37.0 - 54.0 fl Final     MPV   Date Value Ref Range Status   2024 9.4 6.0 - 12.0 fL Final     Platelets   Date Value Ref Range Status   2024 163 140 - 450 10*3/mm3 Final        Result Review:  I have personally reviewed the results from the time of this admission to 2024 07:46 EDT and agree with these findings:  [x]  Laboratory  []  Microbiology  [x]  Radiology  []  EKG/Telemetry   []  Cardiology/Vascular   []  Pathology  []  Old  records  []  Other:      Assessment & Plan   Assessment / Plan     Brief Patient Summary:  Marisa Uribe is a 73 y.o. female who is status post left hip replacement    Active Hospital Problems:  Active Hospital Problems    Diagnosis     **Osteoarthritis of left hip     Arthritis of left hip      Plan: Weightbearing as tolerated  Physical and Occupational Therapy  Pain control   DVT prophylaxis  Discharge planning: Home later today       DVT prophylaxis:  Medical and mechanical DVT prophylaxis orders are present.        CODE STATUS:   Code Status (Patient has no pulse and is not breathing): CPR (Attempt to Resuscitate)  Medical Interventions (Patient has pulse or is breathing): Full Support    Disposition:  I expect patient to be discharged later today.    Electronically signed by Bg Douglass MD, 05/02/24, 7:46 AM EDT.

## 2024-05-03 DIAGNOSIS — Z47.1 AFTERCARE FOLLOWING LEFT HIP JOINT REPLACEMENT SURGERY: Primary | ICD-10-CM

## 2024-05-03 DIAGNOSIS — Z96.642 AFTERCARE FOLLOWING LEFT HIP JOINT REPLACEMENT SURGERY: Primary | ICD-10-CM

## 2024-05-06 ENCOUNTER — TELEPHONE (OUTPATIENT)
Dept: ORTHOPEDIC SURGERY | Facility: CLINIC | Age: 74
End: 2024-05-06
Payer: MEDICARE

## 2024-05-06 DIAGNOSIS — Z47.1 AFTERCARE FOLLOWING LEFT HIP JOINT REPLACEMENT SURGERY: Primary | ICD-10-CM

## 2024-05-06 DIAGNOSIS — M16.12 PRIMARY OSTEOARTHRITIS OF LEFT HIP: ICD-10-CM

## 2024-05-06 DIAGNOSIS — Z96.642 AFTERCARE FOLLOWING LEFT HIP JOINT REPLACEMENT SURGERY: Primary | ICD-10-CM

## 2024-05-07 RX ORDER — HYDROCODONE BITARTRATE AND ACETAMINOPHEN 7.5; 325 MG/1; MG/1
1 TABLET ORAL EVERY 4 HOURS PRN
Qty: 42 TABLET | Refills: 0 | Status: SHIPPED | OUTPATIENT
Start: 2024-05-07

## 2024-05-10 ENCOUNTER — TELEPHONE (OUTPATIENT)
Dept: FAMILY MEDICINE CLINIC | Facility: CLINIC | Age: 74
End: 2024-05-10
Payer: MEDICARE

## 2024-05-10 NOTE — TELEPHONE ENCOUNTER
I think that her resting blood pressure is more important than post exercise blood pressure reading.  If her resting blood pressures more than 140/90 at rest then I recommend adding blood pressure medication.

## 2024-05-10 NOTE — TELEPHONE ENCOUNTER
Elevated BP after PT, was 160/85. Patient wants to know if she should start back on both BP meds. Had been d/c due to hip surgery.

## 2024-05-14 ENCOUNTER — TELEPHONE (OUTPATIENT)
Dept: ORTHOPEDIC SURGERY | Facility: CLINIC | Age: 74
End: 2024-05-14

## 2024-05-14 ENCOUNTER — OFFICE VISIT (OUTPATIENT)
Dept: ORTHOPEDIC SURGERY | Facility: CLINIC | Age: 74
End: 2024-05-14
Payer: MEDICARE

## 2024-05-14 VITALS
HEIGHT: 63 IN | DIASTOLIC BLOOD PRESSURE: 77 MMHG | WEIGHT: 161.6 LBS | BODY MASS INDEX: 28.63 KG/M2 | OXYGEN SATURATION: 96 % | SYSTOLIC BLOOD PRESSURE: 156 MMHG | HEART RATE: 78 BPM

## 2024-05-14 DIAGNOSIS — Z47.89 AFTERCARE FOLLOWING SURGERY OF THE MUSCULOSKELETAL SYSTEM: Primary | ICD-10-CM

## 2024-05-14 DIAGNOSIS — M25.552 LEFT HIP PAIN: ICD-10-CM

## 2024-05-14 DIAGNOSIS — Z47.1 AFTERCARE FOLLOWING LEFT HIP JOINT REPLACEMENT SURGERY: ICD-10-CM

## 2024-05-14 DIAGNOSIS — M16.12 PRIMARY OSTEOARTHRITIS OF LEFT HIP: ICD-10-CM

## 2024-05-14 DIAGNOSIS — Z96.642 AFTERCARE FOLLOWING LEFT HIP JOINT REPLACEMENT SURGERY: ICD-10-CM

## 2024-05-14 PROCEDURE — 1159F MED LIST DOCD IN RCRD: CPT | Performed by: PHYSICIAN ASSISTANT

## 2024-05-14 PROCEDURE — 3078F DIAST BP <80 MM HG: CPT | Performed by: PHYSICIAN ASSISTANT

## 2024-05-14 PROCEDURE — 1160F RVW MEDS BY RX/DR IN RCRD: CPT | Performed by: PHYSICIAN ASSISTANT

## 2024-05-14 PROCEDURE — 3077F SYST BP >= 140 MM HG: CPT | Performed by: PHYSICIAN ASSISTANT

## 2024-05-14 PROCEDURE — 99213 OFFICE O/P EST LOW 20 MIN: CPT | Performed by: PHYSICIAN ASSISTANT

## 2024-05-14 RX ORDER — HYDROCODONE BITARTRATE AND ACETAMINOPHEN 7.5; 325 MG/1; MG/1
1 TABLET ORAL EVERY 4 HOURS PRN
Qty: 42 TABLET | Refills: 0 | Status: SHIPPED | OUTPATIENT
Start: 2024-05-14

## 2024-05-14 NOTE — PROGRESS NOTES
Chief Complaint  Pain and Follow-up of the Left Hip and Suture / Staple Removal    Subjective          History of Present Illness      Marisa Uribe is a 73 y.o. female  presents to [facility] for     Patient presents for 2-week postoperative evaluation of left anterior total hip arthroplasty 2024.  Patient states she is happy with her progress thus far she states she has been compliant with LOUISA hose and DVT prophylaxis.  She states her leg did swell when she tried to take the LOUISA hose off but returned to them.  She does states she has some bruising to the left lower extremity but states she has no calf pain.  She is attending therapy 3 times a week.  She states the incision has been healing well and denies drainage or redness or dehiscence.  She had staples removed today.  She takes pain medication she only takes it occasionally she also takes some Tylenol.  She is ambulating with a walker but feels ready to be able to switch to a cane.  She goes to therapy at [facility]      No Known Allergies     Social History     Socioeconomic History    Marital status:    Tobacco Use    Smoking status: Former     Current packs/day: 0.00     Average packs/day: 2.0 packs/day for 15.0 years (30.0 ttl pk-yrs)     Types: Cigarettes     Start date: 1967     Quit date: 1982     Years since quittin.3    Smokeless tobacco: Never    Tobacco comments:     no second hand smoke exposure   Vaping Use    Vaping status: Never Used   Substance and Sexual Activity    Alcohol use: Yes     Alcohol/week: 1.0 standard drink of alcohol     Types: 1 Glasses of wine per week     Comment: rare occasion    Drug use: Never    Sexual activity: Defer        REVIEW OF SYSTEMS    Constitutional: Awake alert and oriented x3, no acute distress, denies fevers, chills, weight loss  Respiratory: No respiratory distress  Vascular: Brisk cap refill, Intact distal pulses, No cyanosis, compartments soft with  "no signs or symptoms of compartment syndrome or DVT.   Cardiovascular: Denies chest pain, shortness of breath  Skin: Denies rashes, acute skin changes  Neurologic: Denies headache, loss of consciousness  MSK: Left hip pain      Objective   Vital Signs:   /77   Pulse 78   Ht 160 cm (63\")   Wt 73.3 kg (161 lb 9.6 oz)   SpO2 96%   BMI 28.63 kg/m²     Body mass index is 28.63 kg/m².    Physical Exam       Left hip: Incision is healing well, no erythema, no drainage or dehiscence, no signs of infection, no fluctuance.  Active flexion 100 no pain with rotation, nontender calf, negative Deisi testing, resolving ecchymosis to the left lower extremity, leg lengths are equal, neurovascularly intact      Procedures    Imaging Results (Most Recent)       Procedure Component Value Units Date/Time    XR Hip With or Without Pelvis 2 - 3 View Left [[id number]] Resulted: 05/14/24 0944     Updated: 05/14/24 0944    Narrative:      View:AP/Lateral view(s)  Site: Left hip  Indication: Left hip pain  Study: X-rays ordered, taken in the office, and reviewed today  X-ray: Intact appearing left total hip arthroplasty, no signs or failure   or loosening, no subsidence or periprosthetic fracture, good alignment  Comparative data: Previous studies             Result Review :   The following data was reviewed by: ZENOBIA Lugo on 05/14/2024:  Data reviewed : Radiologic studies reviewed by me with the patient              Assessment and Plan    Diagnoses and all orders for this visit:    1. Aftercare following surgery of left anterior total hip arthroplasty 5/1/2024 (Primary)    2. Left hip pain  -     XR Hip With or Without Pelvis 2 - 3 View Left        Reviewed x-rays with the patient discussed diagnosis and treatment options with her she was advised to continue therapy she was advised to continue/finish DVT prophylaxis, she was given a refill of her pain medicine.  Weightbearing as tolerated with walker and may " switch to cane when ready.  Sutures/staples removed in office today. Steri-strips applied. Discussed incision care/hygiene. May shower, but do not submerge in water until fully healed.  Advised patient that if any concerning symptoms regarding incision appearance occur that they should call us right away, patient expressed understanding.  Follow-up in 4 weeks for recheck    Call or return if worsening symptoms.    Follow Up   Return in about 4 weeks (around 6/11/2024) for Recheck.  Patient was given instructions and counseling regarding her condition or for health maintenance advice. Please see specific information pulled into the AVS if appropriate.       EMR Dragon/Transcription disclaimer:  Part of this note may be an electronic transcription/translation of spoken language to printed text using the Dragon Dictation System

## 2024-05-28 RX ORDER — LEVOTHYROXINE SODIUM 112 UG/1
112 TABLET ORAL DAILY
Qty: 90 TABLET | Refills: 1 | Status: SHIPPED | OUTPATIENT
Start: 2024-05-28

## 2024-06-05 DIAGNOSIS — I10 ESSENTIAL HYPERTENSION: ICD-10-CM

## 2024-06-05 RX ORDER — AMLODIPINE BESYLATE 10 MG/1
10 TABLET ORAL DAILY
Qty: 90 TABLET | Refills: 1 | Status: SHIPPED | OUTPATIENT
Start: 2024-06-05

## 2024-06-07 ENCOUNTER — OFFICE VISIT (OUTPATIENT)
Dept: FAMILY MEDICINE CLINIC | Facility: CLINIC | Age: 74
End: 2024-06-07
Payer: MEDICARE

## 2024-06-07 ENCOUNTER — LAB (OUTPATIENT)
Dept: LAB | Facility: HOSPITAL | Age: 74
End: 2024-06-07
Payer: MEDICARE

## 2024-06-07 ENCOUNTER — TELEPHONE (OUTPATIENT)
Dept: ORTHOPEDIC SURGERY | Facility: CLINIC | Age: 74
End: 2024-06-07
Payer: MEDICARE

## 2024-06-07 VITALS
DIASTOLIC BLOOD PRESSURE: 68 MMHG | OXYGEN SATURATION: 99 % | WEIGHT: 163.6 LBS | HEIGHT: 63 IN | SYSTOLIC BLOOD PRESSURE: 139 MMHG | TEMPERATURE: 97.6 F | HEART RATE: 58 BPM | BODY MASS INDEX: 28.99 KG/M2

## 2024-06-07 DIAGNOSIS — E78.5 HYPERLIPIDEMIA, UNSPECIFIED HYPERLIPIDEMIA TYPE: ICD-10-CM

## 2024-06-07 DIAGNOSIS — E03.9 ACQUIRED HYPOTHYROIDISM: Chronic | ICD-10-CM

## 2024-06-07 DIAGNOSIS — R42 DIZZINESS: Primary | ICD-10-CM

## 2024-06-07 DIAGNOSIS — E66.3 OVERWEIGHT (BMI 25.0-29.9): ICD-10-CM

## 2024-06-07 DIAGNOSIS — E03.9 HYPOTHYROIDISM, UNSPECIFIED TYPE: ICD-10-CM

## 2024-06-07 DIAGNOSIS — I10 ESSENTIAL HYPERTENSION: Chronic | ICD-10-CM

## 2024-06-07 DIAGNOSIS — Z96.642 AFTERCARE FOLLOWING LEFT HIP JOINT REPLACEMENT SURGERY: Primary | ICD-10-CM

## 2024-06-07 DIAGNOSIS — Z47.89 AFTERCARE FOLLOWING SURGERY OF THE MUSCULOSKELETAL SYSTEM: ICD-10-CM

## 2024-06-07 DIAGNOSIS — R42 DIZZINESS: ICD-10-CM

## 2024-06-07 DIAGNOSIS — Z47.1 AFTERCARE FOLLOWING LEFT HIP JOINT REPLACEMENT SURGERY: Primary | ICD-10-CM

## 2024-06-07 LAB
ALBUMIN SERPL-MCNC: 4.4 G/DL (ref 3.5–5.2)
ALBUMIN/GLOB SERPL: 1.7 G/DL
ALP SERPL-CCNC: 84 U/L (ref 39–117)
ALT SERPL W P-5'-P-CCNC: 15 U/L (ref 1–33)
ANION GAP SERPL CALCULATED.3IONS-SCNC: 11.3 MMOL/L (ref 5–15)
AST SERPL-CCNC: 16 U/L (ref 1–32)
BASOPHILS # BLD AUTO: 0.04 10*3/MM3 (ref 0–0.2)
BASOPHILS NFR BLD AUTO: 0.6 % (ref 0–1.5)
BILIRUB SERPL-MCNC: 0.3 MG/DL (ref 0–1.2)
BUN SERPL-MCNC: 18 MG/DL (ref 8–23)
BUN/CREAT SERPL: 20.7 (ref 7–25)
CALCIUM SPEC-SCNC: 9.7 MG/DL (ref 8.6–10.5)
CHLORIDE SERPL-SCNC: 104 MMOL/L (ref 98–107)
CHOLEST SERPL-MCNC: 194 MG/DL (ref 0–200)
CO2 SERPL-SCNC: 26.7 MMOL/L (ref 22–29)
CREAT SERPL-MCNC: 0.87 MG/DL (ref 0.57–1)
DEPRECATED RDW RBC AUTO: 38 FL (ref 37–54)
EGFRCR SERPLBLD CKD-EPI 2021: 70.9 ML/MIN/1.73
EOSINOPHIL # BLD AUTO: 0.16 10*3/MM3 (ref 0–0.4)
EOSINOPHIL NFR BLD AUTO: 2.2 % (ref 0.3–6.2)
ERYTHROCYTE [DISTWIDTH] IN BLOOD BY AUTOMATED COUNT: 12.7 % (ref 12.3–15.4)
GLOBULIN UR ELPH-MCNC: 2.6 GM/DL
GLUCOSE SERPL-MCNC: 92 MG/DL (ref 65–99)
HCT VFR BLD AUTO: 34 % (ref 34–46.6)
HDLC SERPL-MCNC: 92 MG/DL (ref 40–60)
HGB BLD-MCNC: 11.1 G/DL (ref 12–15.9)
IMM GRANULOCYTES # BLD AUTO: 0.02 10*3/MM3 (ref 0–0.05)
IMM GRANULOCYTES NFR BLD AUTO: 0.3 % (ref 0–0.5)
LDLC SERPL CALC-MCNC: 91 MG/DL (ref 0–100)
LDLC/HDLC SERPL: 0.98 {RATIO}
LYMPHOCYTES # BLD AUTO: 0.9 10*3/MM3 (ref 0.7–3.1)
LYMPHOCYTES NFR BLD AUTO: 12.5 % (ref 19.6–45.3)
MCH RBC QN AUTO: 26.8 PG (ref 26.6–33)
MCHC RBC AUTO-ENTMCNC: 32.6 G/DL (ref 31.5–35.7)
MCV RBC AUTO: 82.1 FL (ref 79–97)
MONOCYTES # BLD AUTO: 0.43 10*3/MM3 (ref 0.1–0.9)
MONOCYTES NFR BLD AUTO: 6 % (ref 5–12)
NEUTROPHILS NFR BLD AUTO: 5.63 10*3/MM3 (ref 1.7–7)
NEUTROPHILS NFR BLD AUTO: 78.4 % (ref 42.7–76)
NRBC BLD AUTO-RTO: 0 /100 WBC (ref 0–0.2)
PLATELET # BLD AUTO: 267 10*3/MM3 (ref 140–450)
PMV BLD AUTO: 9.3 FL (ref 6–12)
POTASSIUM SERPL-SCNC: 4.1 MMOL/L (ref 3.5–5.2)
PROT SERPL-MCNC: 7 G/DL (ref 6–8.5)
RBC # BLD AUTO: 4.14 10*6/MM3 (ref 3.77–5.28)
SODIUM SERPL-SCNC: 142 MMOL/L (ref 136–145)
T4 FREE SERPL-MCNC: 1.39 NG/DL (ref 0.92–1.68)
TRIGL SERPL-MCNC: 58 MG/DL (ref 0–150)
TSH SERPL DL<=0.05 MIU/L-ACNC: 4.89 UIU/ML (ref 0.27–4.2)
VLDLC SERPL-MCNC: 11 MG/DL (ref 5–40)
WBC NRBC COR # BLD AUTO: 7.18 10*3/MM3 (ref 3.4–10.8)

## 2024-06-07 PROCEDURE — 80061 LIPID PANEL: CPT

## 2024-06-07 PROCEDURE — 1125F AMNT PAIN NOTED PAIN PRSNT: CPT | Performed by: NURSE PRACTITIONER

## 2024-06-07 PROCEDURE — 36415 COLL VENOUS BLD VENIPUNCTURE: CPT

## 2024-06-07 PROCEDURE — 85025 COMPLETE CBC W/AUTO DIFF WBC: CPT

## 2024-06-07 PROCEDURE — 99214 OFFICE O/P EST MOD 30 MIN: CPT | Performed by: NURSE PRACTITIONER

## 2024-06-07 PROCEDURE — 84443 ASSAY THYROID STIM HORMONE: CPT

## 2024-06-07 PROCEDURE — 80053 COMPREHEN METABOLIC PANEL: CPT

## 2024-06-07 PROCEDURE — 3078F DIAST BP <80 MM HG: CPT | Performed by: NURSE PRACTITIONER

## 2024-06-07 PROCEDURE — 3075F SYST BP GE 130 - 139MM HG: CPT | Performed by: NURSE PRACTITIONER

## 2024-06-07 PROCEDURE — 84439 ASSAY OF FREE THYROXINE: CPT

## 2024-06-07 RX ORDER — MECLIZINE HYDROCHLORIDE 25 MG/1
25 TABLET ORAL 3 TIMES DAILY PRN
Qty: 40 TABLET | Refills: 1 | Status: SHIPPED | OUTPATIENT
Start: 2024-06-07

## 2024-06-07 NOTE — PROGRESS NOTES
"Chief Complaint  Hypertension and Dizziness (Dizziness started lastnight )    Subjective        Marisa Uribe presents to [facility]  History of Present Illness  Pt presents c/o dizziness x 1 day. States she started feeling dizzy last night, then woke up this morning with dizziness. States dizziness did cause nausea, denies SOB, chest pain, fever, chills, any recent illnesses, abd pain, falls or injuries. Has not taken anything to treat. Denies any recent medication changes.     Pt has FU appt with Dr. Goel next week. Will check labs today.     Reviewed all recent labs and medications.      Objective   Vital Signs:  /68   Pulse 58   Temp 97.6 °F (36.4 °C) (Temporal)   Ht 160 cm (63\")   Wt 74.2 kg (163 lb 9.6 oz)   SpO2 99%   BMI 28.98 kg/m²   Estimated body mass index is 28.98 kg/m² as calculated from the following:    Height as of this encounter: 160 cm (63\").    Weight as of this encounter: 74.2 kg (163 lb 9.6 oz).               Physical Exam  Vitals reviewed.   Constitutional:       General: She is not in acute distress.     Appearance: Normal appearance.   HENT:      Head: Normocephalic.      Right Ear: Tympanic membrane normal.      Left Ear: Tympanic membrane normal.      Nose: Nose normal.      Mouth/Throat:      Pharynx: Oropharynx is clear. No posterior oropharyngeal erythema.   Eyes:      General: No scleral icterus.     Extraocular Movements: Extraocular movements intact.      Conjunctiva/sclera: Conjunctivae normal.      Pupils: Pupils are equal, round, and reactive to light.   Cardiovascular:      Rate and Rhythm: Normal rate and regular rhythm.      Pulses: Normal pulses.      Heart sounds: Normal heart sounds.   Pulmonary:      Effort: Pulmonary effort is normal.      Breath sounds: Normal breath sounds.   Abdominal:      General: Bowel sounds are normal.      Palpations: Abdomen is soft.   Musculoskeletal:         General: Normal range of motion.      " Cervical back: Neck supple.   Skin:     General: Skin is warm and dry.   Neurological:      Mental Status: She is alert and oriented to person, place, and time.   Psychiatric:         Mood and Affect: Mood normal.         Behavior: Behavior normal.         Thought Content: Thought content normal.         Judgment: Judgment normal.        Result Review :      Common labs          4/23/2024    10:57 5/1/2024    10:15 5/2/2024    03:18   Common Labs   Glucose 90   106    BUN 19   18    Creatinine 0.85   0.79    Sodium 137   139    Potassium 3.9   3.6    Chloride 99   107    Calcium 9.5   8.3    Albumin 4.3      Total Bilirubin 0.5      Alkaline Phosphatase 64      AST (SGOT) 18      ALT (SGPT) 20      WBC 6.15   8.69    Hemoglobin 12.6  11.4  9.0    Hematocrit 37.4  34.6  26.8    Platelets 239   163    Hemoglobin A1C 4.90        Data reviewed : Consultant notes ortho              Assessment and Plan     Diagnoses and all orders for this visit:    1. Dizziness (Primary)  Assessment & Plan:  Disc possible DD including BPV, medication SE, BP changes, glucose changes, electrolyte imbalance and other.   Order for labs today   Pt has appt with PCP next week   Start meclizine 25mg PO tid prn   Disc fall precautions/exercises to treat    Orders:  -     Comprehensive metabolic panel; Future    2. Overweight (BMI 25.0-29.9)  Assessment & Plan:  Patient's (There is no height or weight on file to calculate BMI.) indicates that they are overweight with health conditions that include hypertension . Weight is unchanged. BMI is is above average; BMI management plan is completed. We discussed portion control and increasing exercise.       3. Acquired hypothyroidism  Assessment & Plan:  Last TSH was elevated, no changes were made, advised to recheck in 3 months   Recheck today         4. Hypothyroidism, unspecified type  -     TSH+Free T4; Future    5. Aftercare following surgery of left anterior total hip arthroplasty  5/1/2024  Assessment & Plan:  Improving with treatment   Continue to take norco sparingly for pain and keep all FU with ortho   Brett reviewed and appropriate       6. Essential hypertension  Assessment & Plan:  Hypertension is borderline  Continue current treatment regimen.  Dietary sodium restriction.  Weight loss.  Regular aerobic exercise.  Ambulatory blood pressure monitoring.  Blood pressure will be reassessedat the next regular appointment.      7. Hyperlipidemia, unspecified hyperlipidemia type  -     Lipid Panel; Future  -     CBC & Differential; Future    Other orders  -     meclizine (ANTIVERT) 25 MG tablet; Take 1 tablet by mouth 3 (Three) Times a Day As Needed for Dizziness.  Dispense: 40 tablet; Refill: 1             Follow Up     Return in about 4 weeks (around 7/5/2024), or if symptoms worsen or fail to improve.  Patient was given instructions and counseling regarding her condition or for health maintenance advice. Please see specific information pulled into the AVS if appropriate.

## 2024-06-07 NOTE — TELEPHONE ENCOUNTER
----- Message from Tosha PALACIOS sent at 6/6/2024  3:56 PM EDT -----  Regarding: DR GAITAN PATIENT /PT REFERRAL  DR GAITAN PATIENT - NEEDS NEW PT REFERRAL FOR L HIP PER MEDICARE GUIDELINES

## 2024-06-07 NOTE — ASSESSMENT & PLAN NOTE
Disc possible DD including BPV, medication SE, BP changes, glucose changes, electrolyte imbalance and other.   Order for labs today   Pt has appt with PCP next week   Start meclizine 25mg PO tid prn   Disc fall precautions/exercises to treat

## 2024-06-07 NOTE — ASSESSMENT & PLAN NOTE
Improving with treatment   Continue to take norco sparingly for pain and keep all FU with ortho   Brett reviewed and appropriate

## 2024-06-07 NOTE — ASSESSMENT & PLAN NOTE
Patient's (There is no height or weight on file to calculate BMI.) indicates that they are overweight with health conditions that include hypertension . Weight is unchanged. BMI is is above average; BMI management plan is completed. We discussed portion control and increasing exercise.

## 2024-06-07 NOTE — PROGRESS NOTES
"Chief Complaint  Hypertension and Dizziness (Dizziness started lastnight )    nJ Uribe presents to [facility]  History of Present Illness    Objective   Vital Signs:  /68   Pulse 58   Temp 97.6 °F (36.4 °C) (Temporal)   Ht 160 cm (63\")   Wt 74.2 kg (163 lb 9.6 oz)   SpO2 99%   BMI 28.98 kg/m²   Estimated body mass index is 28.98 kg/m² as calculated from the following:    Height as of this encounter: 160 cm (63\").    Weight as of this encounter: 74.2 kg (163 lb 9.6 oz).               Physical Exam   Result Review :                     Assessment and Plan     Diagnoses and all orders for this visit:    1. Dizziness (Primary)  -     Comprehensive metabolic panel; Future    2. Overweight (BMI 25.0-29.9)  Assessment & Plan:  Patient's (There is no height or weight on file to calculate BMI.) indicates that they are overweight with health conditions that include hypertension . Weight is unchanged. BMI is is above average; BMI management plan is completed. We discussed portion control and increasing exercise.       3. Acquired hypothyroidism    4. Hypothyroidism, unspecified type  -     TSH+Free T4; Future    5. Aftercare following surgery of left anterior total hip arthroplasty 5/1/2024    6. Essential hypertension             Follow Up     Return in about 4 weeks (around 7/5/2024), or if symptoms worsen or fail to improve.  Patient was given instructions and counseling regarding her condition or for health maintenance advice. Please see specific information pulled into the AVS if appropriate.         "

## 2024-06-13 ENCOUNTER — OFFICE VISIT (OUTPATIENT)
Dept: FAMILY MEDICINE CLINIC | Facility: CLINIC | Age: 74
End: 2024-06-13
Payer: MEDICARE

## 2024-06-13 VITALS
DIASTOLIC BLOOD PRESSURE: 69 MMHG | TEMPERATURE: 97.8 F | HEART RATE: 63 BPM | WEIGHT: 166.8 LBS | SYSTOLIC BLOOD PRESSURE: 112 MMHG | BODY MASS INDEX: 29.55 KG/M2 | HEIGHT: 63 IN | OXYGEN SATURATION: 99 % | RESPIRATION RATE: 18 BRPM

## 2024-06-13 DIAGNOSIS — Z00.00 MEDICARE ANNUAL WELLNESS VISIT, SUBSEQUENT: Primary | ICD-10-CM

## 2024-06-13 DIAGNOSIS — I10 ESSENTIAL HYPERTENSION: ICD-10-CM

## 2024-06-13 DIAGNOSIS — E03.9 ACQUIRED HYPOTHYROIDISM: Chronic | ICD-10-CM

## 2024-06-13 DIAGNOSIS — R42 VERTIGO: ICD-10-CM

## 2024-06-13 PROCEDURE — 99213 OFFICE O/P EST LOW 20 MIN: CPT | Performed by: FAMILY MEDICINE

## 2024-06-13 PROCEDURE — G0439 PPPS, SUBSEQ VISIT: HCPCS | Performed by: FAMILY MEDICINE

## 2024-06-13 PROCEDURE — 1159F MED LIST DOCD IN RCRD: CPT | Performed by: FAMILY MEDICINE

## 2024-06-13 PROCEDURE — 3078F DIAST BP <80 MM HG: CPT | Performed by: FAMILY MEDICINE

## 2024-06-13 PROCEDURE — 1160F RVW MEDS BY RX/DR IN RCRD: CPT | Performed by: FAMILY MEDICINE

## 2024-06-13 PROCEDURE — 3074F SYST BP LT 130 MM HG: CPT | Performed by: FAMILY MEDICINE

## 2024-06-13 PROCEDURE — 1170F FXNL STATUS ASSESSED: CPT | Performed by: FAMILY MEDICINE

## 2024-06-13 NOTE — PROGRESS NOTES
The ABCs of the Annual Wellness Visit  Subsequent Medicare Wellness Visit    Subjective    Marisa Uribe is a 73 y.o. female who presents for a Subsequent Medicare Wellness Visit.    The following portions of the patient's history were reviewed and   updated as appropriate: allergies, current medications, past family history, past medical history, past social history, past surgical history, and problem list.    Compared to one year ago, the patient feels her physical   health is the same.    Compared to one year ago, the patient feels her mental   health is the same.    Recent Hospitalizations:  She was not admitted within the past 365 days.       Current Medical Providers:  Patient Care Team:  Amy Goel DO as PCP - General (Family Medicine)    Outpatient Medications Prior to Visit   Medication Sig Dispense Refill    amLODIPine (NORVASC) 10 MG tablet TAKE 1 TABLET BY MOUTH EVERY DAY 90 tablet 1    levothyroxine (SYNTHROID, LEVOTHROID) 112 MCG tablet TAKE 1 TABLET BY MOUTH EVERY DAY 90 tablet 1    losartan-hydrochlorothiazide (HYZAAR) 100-25 MG per tablet TAKE 1 TABLET BY MOUTH EVERY DAY 90 tablet 1    meclizine (ANTIVERT) 25 MG tablet Take 1 tablet by mouth 3 (Three) Times a Day As Needed for Dizziness. 40 tablet 1    Nutritional Supplements (NUTRITIONAL SUPPLEMENT PO) Take 1 packet by mouth 3 (Three) Times a Day. 1 packet TID  for minor aches and pains      omeprazole (priLOSEC) 20 MG capsule Take 1 capsule by mouth Daily As Needed. (Patient not taking: Reported on 6/13/2024)      HYDROcodone-acetaminophen (Norco) 7.5-325 MG per tablet Take 1 tablet by mouth Every 4 (Four) Hours As Needed for Moderate Pain. (Patient not taking: Reported on 6/13/2024) 42 tablet 0    naloxone (NARCAN) 4 MG/0.1ML nasal spray Call 911. Don't prime. [place] in 1 nostril for overdose. Repeat in 2-3 minutes in other nostril if no or minimal breathing/responsiveness. (Patient not taking: Reported on 6/13/2024) 2 each 0     No  "facility-administered medications prior to visit.       No opioid medication identified on active medication list. I have reviewed chart for other potential  high risk medication/s and harmful drug interactions in the elderly.        Aspirin is not on active medication list.  Aspirin use is not indicated based on review of current medical condition/s. Risk of harm outweighs potential benefits.  .    Patient Active Problem List   Diagnosis    Acquired hypothyroidism    Essential hypertension    Mixed hyperlipidemia    Muscle spasm    Osteoarthritis of left hip    Gastroesophageal reflux disease without esophagitis    Vitamin D deficiency    Chronic bilateral low back pain without sciatica    Overweight (BMI 25.0-29.9)    Pain in both knees    Arthritis of left hip    Aftercare following surgery of left anterior total hip arthroplasty 5/1/2024    Vertigo     Advance Care Planning   Advance Care Planning     Advance Directive is not on file.  ACP discussion was held with the patient during this visit. Patient has an advance directive (not in EMR), copy requested.     Objective    Vitals:    06/13/24 0947   BP: 112/69   BP Location: Left arm   Patient Position: Sitting   Cuff Size: Adult   Pulse: 63   Resp: 18   Temp: 97.8 °F (36.6 °C)   TempSrc: Tympanic   SpO2: 99%   Weight: 75.7 kg (166 lb 12.8 oz)   Height: 160 cm (62.99\")   PainSc: 0-No pain     Estimated body mass index is 29.55 kg/m² as calculated from the following:    Height as of this encounter: 160 cm (62.99\").    Weight as of this encounter: 75.7 kg (166 lb 12.8 oz).           Does the patient have evidence of cognitive impairment? No    Lab Results   Component Value Date    TRIG 58 06/07/2024    HDL 92 (H) 06/07/2024    LDL 91 06/07/2024    VLDL 11 06/07/2024    HGBA1C 4.90 04/23/2024        HEALTH RISK ASSESSMENT    Smoking Status:  Social History     Tobacco Use   Smoking Status Former    Current packs/day: 0.00    Average packs/day: 2.0 packs/day for " 15.0 years (30.0 ttl pk-yrs)    Types: Cigarettes    Start date: 1967    Quit date: 1982    Years since quittin.4   Smokeless Tobacco Never   Tobacco Comments    no second hand smoke exposure     Alcohol Consumption:  Social History     Substance and Sexual Activity   Alcohol Use Yes    Alcohol/week: 1.0 standard drink of alcohol    Types: 1 Glasses of wine per week    Comment: rare occasion     Fall Risk Screen:    MARIANO Fall Risk Assessment was completed, and patient is at LOW risk for falls.Assessment completed on:2024    Depression Screenin/13/2024     9:00 AM   PHQ-2/PHQ-9 Depression Screening   Little Interest or Pleasure in Doing Things 0-->not at all   Feeling Down, Depressed or Hopeless 0-->not at all   Trouble Falling or Staying Asleep, or Sleeping Too Much 0-->not at all   Feeling Tired or Having Little Energy 0-->not at all   Poor Appetite or Overeating 0-->not at all   Feeling Bad about Yourself - or that You are a Failure or Have Let Yourself or Your Family Down 0-->not at all   Trouble Concentrating on Things, Such as Reading the Newspaper or Watching Television 0-->not at all   Moving or Speaking So Slowly that Other People Could Have Noticed? Or the Opposite - Being So Fidgety 0-->not at all   Thoughts that You Would be Better Off Dead or of Hurting Yourself in Some Way 0-->not at all   PHQ-9: Brief Depression Severity Measure Score 0   If You Checked Off Any Problems, How Difficult Have These Problems Made It For You to Do Your Work, Take Care of Things at Home, or Get Along with Other People? not difficult at all       Health Habits and Functional and Cognitive Screenin/13/2024     9:00 AM   Functional & Cognitive Status   Do you have difficulty preparing food and eating? No   Do you have difficulty bathing yourself, getting dressed or grooming yourself? No   Do you have difficulty using the toilet? No   Do you have difficulty moving around from place to place?  No   Do you have trouble with steps or getting out of a bed or a chair? No   Current Diet Well Balanced Diet   Dental Exam Up to date   Eye Exam Up to date   Exercise (times per week) 4 times per week   Current Exercises Include Yard Work;House Cleaning;Walking   Do you need help using the phone?  No   Are you deaf or do you have serious difficulty hearing?  No   Do you need help to go to places out of walking distance? No   Do you need help shopping? No   Do you need help preparing meals?  No   Do you need help with housework?  No   Do you need help with laundry? No   Do you need help taking your medications? No   Do you need help managing money? No   Do you ever drive or ride in a car without wearing a seat belt? No   Have you felt unusual stress, anger or loneliness in the last month? No   Who do you live with? Spouse   If you need help, do you have trouble finding someone available to you? No   Have you been bothered in the last four weeks by sexual problems? No   Do you have difficulty concentrating, remembering or making decisions? No       Age-appropriate Screening Schedule:  Refer to the list below for future screening recommendations based on patient's age, sex and/or medical conditions. Orders for these recommended tests are listed in the plan section. The patient has been provided with a written plan.    Health Maintenance   Topic Date Due    ZOSTER VACCINE (1 of 2) Never done    TDAP/TD VACCINES (1 - Tdap) 06/21/2024 (Originally 10/27/1969)    COVID-19 Vaccine (3 - 2023-24 season) 08/27/2024 (Originally 9/1/2023)    Pneumococcal Vaccine 65+ (1 of 1 - PCV) 12/14/2024 (Originally 10/27/2016)    RSV Vaccine - Adults (1 - 1-dose 60+ series) 03/08/2025 (Originally 10/27/2011)    INFLUENZA VACCINE  08/01/2024    LIPID PANEL  06/07/2025    BMI FOLLOWUP  06/07/2025    ANNUAL WELLNESS VISIT  06/13/2025    MAMMOGRAM  01/15/2026    DXA SCAN  01/15/2026    COLORECTAL CANCER SCREENING  11/30/2030    HEPATITIS C  "SCREENING  Completed                  CMS Preventative Services Quick Reference  Risk Factors Identified During Encounter  Fall Risk-High or Moderate: Discussed Fall Prevention in the home  The above risks/problems have been discussed with the patient.  Pertinent information has been shared with the patient in the After Visit Summary.  An After Visit Summary and PPPS were made available to the patient.    Follow Up:   Next Medicare Wellness visit to be scheduled in 1 year.       Additional E&M Note during same encounter follows:  Patient has multiple medical problems which are significant and separately identifiable that require additional work above and beyond the Medicare Wellness Visit.      Chief Complaint  Medicare Wellness-subsequent, Hypertension, and Dizziness    Subjective        HPI  Marisa Uribe is also being seen today for HTN, hypothyroidism and vertigo.     Review of Systems   HENT:  Negative for trouble swallowing.    Eyes:  Negative for visual disturbance.   Respiratory:  Negative for apnea.    Cardiovascular:  Negative for chest pain.   Gastrointestinal:  Negative for blood in stool.   Endocrine: Negative for polyphagia.   Genitourinary:  Negative for dysuria.   Skin:  Negative for color change.   Allergic/Immunologic: Negative for immunocompromised state.   Neurological:  Positive for dizziness. Negative for seizures.   Hematological:  Negative for adenopathy.   Psychiatric/Behavioral:  Negative for behavioral problems.        Objective   Vital Signs:  /69 (BP Location: Left arm, Patient Position: Sitting, Cuff Size: Adult)   Pulse 63   Temp 97.8 °F (36.6 °C) (Tympanic)   Resp 18   Ht 160 cm (62.99\")   Wt 75.7 kg (166 lb 12.8 oz)   SpO2 99%   BMI 29.55 kg/m²     Physical Exam  Vitals reviewed.   Constitutional:       Appearance: She is well-developed and overweight.   HENT:      Head: Normocephalic and atraumatic.      Right Ear: External ear normal.      Left Ear: External ear " normal.      Mouth/Throat:      Pharynx: No oropharyngeal exudate.   Eyes:      Conjunctiva/sclera: Conjunctivae normal.      Pupils: Pupils are equal, round, and reactive to light.   Neck:      Vascular: No carotid bruit.   Cardiovascular:      Rate and Rhythm: Normal rate and regular rhythm.      Heart sounds: No murmur heard.     No friction rub. No gallop.   Pulmonary:      Effort: Pulmonary effort is normal.      Breath sounds: Normal breath sounds. No wheezing or rhonchi.   Abdominal:      General: There is no distension.   Skin:     General: Skin is warm and dry.   Neurological:      Mental Status: She is alert and oriented to person, place, and time.      Cranial Nerves: No cranial nerve deficit.      Motor: No weakness.   Psychiatric:         Mood and Affect: Mood and affect normal.         Behavior: Behavior normal.         Thought Content: Thought content normal.         Judgment: Judgment normal.            CMP          4/23/2024    10:57 5/2/2024    03:18 6/7/2024    10:16   CMP   Glucose 90  106  92    BUN 19  18  18    Creatinine 0.85  0.79  0.87    EGFR 72.9  79.6  70.9    Sodium 137  139  142    Potassium 3.9  3.6  4.1    Chloride 99  107  104    Calcium 9.5  8.3  9.7    Total Protein 6.6   7.0    Albumin 4.3   4.4    Globulin 2.3   2.6    Total Bilirubin 0.5   0.3    Alkaline Phosphatase 64   84    AST (SGOT) 18   16    ALT (SGPT) 20   15    Albumin/Globulin Ratio 1.9   1.7    BUN/Creatinine Ratio 22.4  22.8  20.7    Anion Gap 10.8  11.4  11.3      CBC          5/1/2024    10:15 5/2/2024    03:18 6/7/2024    10:16   CBC   WBC  8.69  7.18    RBC  3.12  4.14    Hemoglobin 11.4  9.0  11.1    Hematocrit 34.6  26.8  34.0    MCV  85.9  82.1    MCH  28.8  26.8    MCHC  33.6  32.6    RDW  13.0  12.7    Platelets  163  267      Lipid Panel          12/6/2023    08:28 6/7/2024    10:16   Lipid Panel   Total Cholesterol 184  194    Triglycerides 62  58    HDL Cholesterol 73  92    VLDL Cholesterol 12  11     LDL Cholesterol  99  91    LDL/HDL Ratio 1.35  0.98      TSH          12/6/2023    08:28 3/26/2024    08:49 6/7/2024    10:16   TSH   TSH 11.200  8.060  4.890                 Assessment and Plan   Diagnoses and all orders for this visit:    1. Medicare annual wellness visit, subsequent (Primary)    2. Essential hypertension  Assessment & Plan:  Hypertension is stable and controlled  Continue current treatment regimen.  Dietary sodium restriction.  Weight loss.  Blood pressure will be reassessed in 6 months.      3. Acquired hypothyroidism  -     TSH+Free T4; Future    4. Vertigo  Assessment & Plan:  Her vertigo is improving with meclazine as prescribed.                Follow Up   Return in about 6 months (around 12/13/2024).  Patient was given instructions and counseling regarding her condition or for health maintenance advice. Please see specific information pulled into the AVS if appropriate.

## 2024-06-18 ENCOUNTER — OFFICE VISIT (OUTPATIENT)
Dept: ORTHOPEDIC SURGERY | Facility: CLINIC | Age: 74
End: 2024-06-18
Payer: MEDICARE

## 2024-06-18 VITALS
DIASTOLIC BLOOD PRESSURE: 76 MMHG | BODY MASS INDEX: 29.41 KG/M2 | HEIGHT: 63 IN | OXYGEN SATURATION: 96 % | SYSTOLIC BLOOD PRESSURE: 144 MMHG | WEIGHT: 166 LBS | HEART RATE: 67 BPM

## 2024-06-18 DIAGNOSIS — Z47.89 AFTERCARE FOLLOWING SURGERY OF THE MUSCULOSKELETAL SYSTEM: Primary | ICD-10-CM

## 2024-06-18 DIAGNOSIS — Z96.642 AFTERCARE FOLLOWING LEFT HIP JOINT REPLACEMENT SURGERY: Primary | ICD-10-CM

## 2024-06-18 DIAGNOSIS — Z47.1 AFTERCARE FOLLOWING LEFT HIP JOINT REPLACEMENT SURGERY: Primary | ICD-10-CM

## 2024-06-18 PROCEDURE — 3078F DIAST BP <80 MM HG: CPT | Performed by: PHYSICIAN ASSISTANT

## 2024-06-18 PROCEDURE — 3077F SYST BP >= 140 MM HG: CPT | Performed by: PHYSICIAN ASSISTANT

## 2024-06-18 PROCEDURE — 1160F RVW MEDS BY RX/DR IN RCRD: CPT | Performed by: PHYSICIAN ASSISTANT

## 2024-06-18 PROCEDURE — 99024 POSTOP FOLLOW-UP VISIT: CPT | Performed by: PHYSICIAN ASSISTANT

## 2024-06-18 PROCEDURE — 1159F MED LIST DOCD IN RCRD: CPT | Performed by: PHYSICIAN ASSISTANT

## 2024-06-19 RX ORDER — HYDROCODONE BITARTRATE AND ACETAMINOPHEN 7.5; 325 MG/1; MG/1
1 TABLET ORAL EVERY 6 HOURS PRN
Qty: 28 TABLET | Refills: 0 | Status: SHIPPED | OUTPATIENT
Start: 2024-06-19

## 2024-07-22 DIAGNOSIS — Z96.642 AFTERCARE FOLLOWING LEFT HIP JOINT REPLACEMENT SURGERY: ICD-10-CM

## 2024-07-22 DIAGNOSIS — Z47.1 AFTERCARE FOLLOWING LEFT HIP JOINT REPLACEMENT SURGERY: ICD-10-CM

## 2024-07-22 RX ORDER — HYDROCODONE BITARTRATE AND ACETAMINOPHEN 7.5; 325 MG/1; MG/1
1 TABLET ORAL EVERY 6 HOURS PRN
Qty: 28 TABLET | Refills: 0 | Status: SHIPPED | OUTPATIENT
Start: 2024-07-22

## 2024-07-30 ENCOUNTER — OFFICE VISIT (OUTPATIENT)
Dept: ORTHOPEDIC SURGERY | Facility: CLINIC | Age: 74
End: 2024-07-30
Payer: MEDICARE

## 2024-07-30 VITALS
WEIGHT: 166 LBS | BODY MASS INDEX: 29.41 KG/M2 | OXYGEN SATURATION: 96 % | HEART RATE: 60 BPM | SYSTOLIC BLOOD PRESSURE: 144 MMHG | HEIGHT: 63 IN | DIASTOLIC BLOOD PRESSURE: 83 MMHG

## 2024-07-30 DIAGNOSIS — Z47.89 AFTERCARE FOLLOWING SURGERY OF THE MUSCULOSKELETAL SYSTEM: Primary | ICD-10-CM

## 2024-07-30 PROCEDURE — 1160F RVW MEDS BY RX/DR IN RCRD: CPT | Performed by: PHYSICIAN ASSISTANT

## 2024-07-30 PROCEDURE — 3077F SYST BP >= 140 MM HG: CPT | Performed by: PHYSICIAN ASSISTANT

## 2024-07-30 PROCEDURE — 99024 POSTOP FOLLOW-UP VISIT: CPT | Performed by: PHYSICIAN ASSISTANT

## 2024-07-30 PROCEDURE — 3079F DIAST BP 80-89 MM HG: CPT | Performed by: PHYSICIAN ASSISTANT

## 2024-07-30 PROCEDURE — 1159F MED LIST DOCD IN RCRD: CPT | Performed by: PHYSICIAN ASSISTANT

## 2024-07-30 NOTE — PROGRESS NOTES
Chief Complaint  Follow-up of the Left Hip    Subjective          History of Present Illness      Marisa Uribe is a 73 y.o. female  presents to [facility] for     Patient presents for follow-up evaluation of left anterior total hip arthroplasty, 2024.  Patient states that she has had to request pain medication refill because her pain recently has been bothering her and causing her inability to sleep.  She states she has achy pain at night in the thigh and in her hip.  She denies trauma or injury she states the incision has healed well and denies signs of infection.  She did about 6 weeks of therapy.  She has been trying to do her own home exercises.  She states that her  is very active and she is not able to tolerate all the activities he would have her do with him.      No Known Allergies     Social History     Socioeconomic History    Marital status:    Tobacco Use    Smoking status: Former     Current packs/day: 0.00     Average packs/day: 2.0 packs/day for 15.0 years (30.0 ttl pk-yrs)     Types: Cigarettes     Start date: 1967     Quit date: 1982     Years since quittin.6    Smokeless tobacco: Never    Tobacco comments:     no second hand smoke exposure   Vaping Use    Vaping status: Never Used   Substance and Sexual Activity    Alcohol use: Yes     Alcohol/week: 1.0 standard drink of alcohol     Types: 1 Glasses of wine per week     Comment: rare occasion    Drug use: Never    Sexual activity: Defer        REVIEW OF SYSTEMS    Constitutional: Awake alert and oriented x3, no acute distress, denies fevers, chills, weight loss  Respiratory: No respiratory distress  Vascular: Brisk cap refill, Intact distal pulses, No cyanosis, compartments soft with no signs or symptoms of compartment syndrome or DVT.   Cardiovascular: Denies chest pain, shortness of breath  Skin: Denies rashes, acute skin changes  Neurologic: Denies headache, loss of  "consciousness  MSK: Left hip pain      Objective   Vital Signs:   /83   Pulse 60   Ht 160 cm (62.99\")   Wt 75.3 kg (166 lb)   SpO2 96%   BMI 29.41 kg/m²     Body mass index is 29.41 kg/m².    Physical Exam       Left hip: Tender to palpation in the thigh and lateral hip region, leg lengths are equal, nontender calf, negative Deisi testing, active flexion 120 external rotation 25 internal rotation 20, strength appropriate, patient ambulates with nonantalgic gait    Procedures    Imaging Results (Most Recent)       Procedure Component Value Units Date/Time    XR Hip With or Without Pelvis 2 - 3 View Left [[id number]] Resulted: 07/30/24 1004     Updated: 07/30/24 1004    Narrative:      View:AP/Lateral view(s)  Site: Left hip  Indication: Left hip pain  Study: X-rays ordered, taken in the office, and reviewed today  X-ray: Intact appearing left total hip arthroplasty, no signs hardware   failure or loosening, no subsidence or periprosthetic fracture, good   alignment  Comparative data: Previous studies             Result Review :   The following data was reviewed by: ZENOBIA Lugo on 07/30/2024:  Data reviewed : Radiologic studies reviewed by me with the patient              Assessment and Plan    Diagnoses and all orders for this visit:    1. Aftercare following surgery of left anterior total hip arthroplasty 5/1/2024 (Primary)  -     XR Hip With or Without Pelvis 2 - 3 View Left  -     Ambulatory Referral to Physical Therapy        Reviewed x-rays with the patient discussed diagnosis and treatment options with her she was advised we recommend restarting therapy for strength and range of motion, she agreed, she was also given a pain cream prescription, use as directed, follow-up in 8 weeks for recheck at [facility] office    Call or return if worsening symptoms.    Follow Up   Return in about 8 weeks (around 9/24/2024) for Recheck.  Patient was given instructions and counseling regarding her " condition or for health maintenance advice. Please see specific information pulled into the AVS if appropriate.       EMR Dragon/Transcription disclaimer:  Part of this note may be an electronic transcription/translation of spoken language to printed text using the Dragon Dictation System

## 2024-09-17 NOTE — ASSESSMENT & PLAN NOTE
Lipid abnormalities are improving with lifestyle modifications.  Nutritional counseling was provided.  Lipids will be reassessed in 6 months.   Managed as above

## 2024-09-26 ENCOUNTER — OFFICE VISIT (OUTPATIENT)
Dept: ORTHOPEDIC SURGERY | Facility: CLINIC | Age: 74
End: 2024-09-26
Payer: MEDICARE

## 2024-09-26 VITALS
HEART RATE: 60 BPM | BODY MASS INDEX: 28 KG/M2 | SYSTOLIC BLOOD PRESSURE: 147 MMHG | WEIGHT: 158 LBS | HEIGHT: 63 IN | OXYGEN SATURATION: 95 % | DIASTOLIC BLOOD PRESSURE: 77 MMHG

## 2024-09-26 DIAGNOSIS — Z47.89 AFTERCARE FOLLOWING SURGERY OF THE MUSCULOSKELETAL SYSTEM: Primary | ICD-10-CM
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
## 2024-10-23 RX ORDER — LOSARTAN POTASSIUM AND HYDROCHLOROTHIAZIDE 25; 100 MG/1; MG/1
1 TABLET ORAL DAILY
Qty: 90 TABLET | Refills: 1 | Status: SHIPPED | OUTPATIENT
Start: 2024-10-23

## 2024-10-23 RX ORDER — LEVOTHYROXINE SODIUM 112 UG/1
112 TABLET ORAL DAILY
Qty: 90 TABLET | Refills: 1 | Status: SHIPPED | OUTPATIENT
Start: 2024-10-23

## 2024-10-23 NOTE — TELEPHONE ENCOUNTER
I refilled this patient's meds. Please call and let know.     
LOV- 06/13/2024  NOV- 12/13/2024  
Previous Labs: Yes
How Did The Hair Loss Occur?: gradual in onset
How Severe Is Your Hair Loss?: mild

## 2024-11-13 DIAGNOSIS — Z47.89 AFTERCARE FOLLOWING SURGERY OF THE MUSCULOSKELETAL SYSTEM: Primary | ICD-10-CM

## 2024-11-13 RX ORDER — AMOXICILLIN 500 MG/1
CAPSULE ORAL
Qty: 4 CAPSULE | Refills: 0 | Status: SHIPPED | OUTPATIENT
Start: 2024-11-13

## 2024-11-13 NOTE — TELEPHONE ENCOUNTER
Patient called and is having a dental cleaning tomorrow 11/14/2024. Patient is needing her antibiotic due to hip replacement last May. Patient is needing medication sent to [facility] [place].

## 2024-12-09 ENCOUNTER — LAB (OUTPATIENT)
Dept: LAB | Facility: HOSPITAL | Age: 74
End: 2024-12-09
Payer: MEDICARE

## 2024-12-09 DIAGNOSIS — E03.9 ACQUIRED HYPOTHYROIDISM: Chronic | ICD-10-CM

## 2024-12-09 LAB
T4 FREE SERPL-MCNC: 1.5 NG/DL (ref 0.92–1.68)
TSH SERPL DL<=0.05 MIU/L-ACNC: 3.5 UIU/ML (ref 0.27–4.2)

## 2024-12-09 PROCEDURE — 36415 COLL VENOUS BLD VENIPUNCTURE: CPT

## 2024-12-09 PROCEDURE — 84443 ASSAY THYROID STIM HORMONE: CPT

## 2024-12-09 PROCEDURE — 84439 ASSAY OF FREE THYROXINE: CPT

## 2024-12-13 ENCOUNTER — OFFICE VISIT (OUTPATIENT)
Dept: FAMILY MEDICINE CLINIC | Facility: CLINIC | Age: 74
End: 2024-12-13
Payer: MEDICARE

## 2024-12-13 VITALS
WEIGHT: 168.2 LBS | SYSTOLIC BLOOD PRESSURE: 131 MMHG | TEMPERATURE: 97.7 F | BODY MASS INDEX: 29.8 KG/M2 | OXYGEN SATURATION: 99 % | DIASTOLIC BLOOD PRESSURE: 71 MMHG | HEIGHT: 63 IN | RESPIRATION RATE: 17 BRPM | HEART RATE: 66 BPM

## 2024-12-13 DIAGNOSIS — E03.9 ACQUIRED HYPOTHYROIDISM: Chronic | ICD-10-CM

## 2024-12-13 DIAGNOSIS — Z12.31 ENCOUNTER FOR SCREENING MAMMOGRAM FOR MALIGNANT NEOPLASM OF BREAST: ICD-10-CM

## 2024-12-13 DIAGNOSIS — I10 ESSENTIAL HYPERTENSION: Primary | Chronic | ICD-10-CM

## 2024-12-13 DIAGNOSIS — E78.2 MIXED HYPERLIPIDEMIA: Chronic | ICD-10-CM

## 2024-12-13 DIAGNOSIS — Z00.00 ANNUAL PHYSICAL EXAM: ICD-10-CM

## 2024-12-13 PROBLEM — E66.3 OVERWEIGHT (BMI 25.0-29.9): Status: RESOLVED | Noted: 2024-03-08 | Resolved: 2024-12-13

## 2024-12-13 PROCEDURE — 1160F RVW MEDS BY RX/DR IN RCRD: CPT | Performed by: FAMILY MEDICINE

## 2024-12-13 PROCEDURE — G2211 COMPLEX E/M VISIT ADD ON: HCPCS | Performed by: FAMILY MEDICINE

## 2024-12-13 PROCEDURE — 3078F DIAST BP <80 MM HG: CPT | Performed by: FAMILY MEDICINE

## 2024-12-13 PROCEDURE — 1159F MED LIST DOCD IN RCRD: CPT | Performed by: FAMILY MEDICINE

## 2024-12-13 PROCEDURE — 3075F SYST BP GE 130 - 139MM HG: CPT | Performed by: FAMILY MEDICINE

## 2024-12-13 PROCEDURE — 99214 OFFICE O/P EST MOD 30 MIN: CPT | Performed by: FAMILY MEDICINE

## 2024-12-13 PROCEDURE — 1126F AMNT PAIN NOTED NONE PRSNT: CPT | Performed by: FAMILY MEDICINE

## 2024-12-13 NOTE — PROGRESS NOTES
"Chief Complaint  Eczema (Pt reports dry area to the tip of her nose that has been present for several months now. Area is not discolored or painful.)    Jn Uribe presents to [facility]  History of Present Illness  She is here today for management of her chronic medical conditions.  She has hypertension, GERD, hypothyroidism.  She is a previous smoker and smoked for 16 years.  Smoked approximately 2 packs a day.  She occasionally uses alcohol.  She has family history of coronary artery disease and lung cancer.    She needs a colonoscopy in 2025.     She had a left hip replacement in May.      The patient has no other complaints today and denies chest pain, shortness of breath, weakness, numbness, nausea, vomiting, diarrhea, dizziness or syncopal event.        Objective   Vital Signs:  /71 (BP Location: Left arm, Patient Position: Sitting, Cuff Size: Adult)   Pulse 66   Temp 97.7 °F (36.5 °C) (Temporal)   Resp 17   Ht 160 cm (62.99\")   Wt 76.3 kg (168 lb 3.2 oz)   SpO2 99%   BMI 29.80 kg/m²   Estimated body mass index is 29.8 kg/m² as calculated from the following:    Height as of this encounter: 160 cm (62.99\").    Weight as of this encounter: 76.3 kg (168 lb 3.2 oz).            Physical Exam  Vitals reviewed.   Constitutional:       Appearance: She is well-developed and overweight.   HENT:      Head: Normocephalic and atraumatic.      Right Ear: External ear normal.      Left Ear: External ear normal.      Mouth/Throat:      Pharynx: No oropharyngeal exudate.   Eyes:      Conjunctiva/sclera: Conjunctivae normal.      Pupils: Pupils are equal, round, and reactive to light.   Neck:      Vascular: No carotid bruit.   Cardiovascular:      Rate and Rhythm: Normal rate and regular rhythm.      Heart sounds: No murmur heard.     No friction rub. No gallop.   Pulmonary:      Effort: Pulmonary effort is normal.      Breath sounds: Normal breath sounds. No " wheezing or rhonchi.   Abdominal:      General: There is no distension.   Skin:     General: Skin is warm and dry.   Neurological:      Mental Status: She is alert and oriented to person, place, and time.      Cranial Nerves: No cranial nerve deficit.      Motor: No weakness.   Psychiatric:         Mood and Affect: Mood and affect normal.         Behavior: Behavior normal.         Thought Content: Thought content normal.         Judgment: Judgment normal.        Result Review :    CMP          4/23/2024    10:57 5/2/2024    03:18 6/7/2024    10:16   CMP   Glucose 90  106  92    BUN 19  18  18    Creatinine 0.85  0.79  0.87    EGFR 72.9  79.6  70.9    Sodium 137  139  142    Potassium 3.9  3.6  4.1    Chloride 99  107  104    Calcium 9.5  8.3  9.7    Total Protein 6.6   7.0    Albumin 4.3   4.4    Globulin 2.3   2.6    Total Bilirubin 0.5   0.3    Alkaline Phosphatase 64   84    AST (SGOT) 18   16    ALT (SGPT) 20   15    Albumin/Globulin Ratio 1.9   1.7    BUN/Creatinine Ratio 22.4  22.8  20.7    Anion Gap 10.8  11.4  11.3      CBC          5/1/2024    10:15 5/2/2024    03:18 6/7/2024    10:16   CBC   WBC  8.69  7.18    RBC  3.12  4.14    Hemoglobin 11.4  9.0  11.1    Hematocrit 34.6  26.8  34.0    MCV  85.9  82.1    MCH  28.8  26.8    MCHC  33.6  32.6    RDW  13.0  12.7    Platelets  163  267      Lipid Panel          6/7/2024    10:16   Lipid Panel   Total Cholesterol 194    Triglycerides 58    HDL Cholesterol 92    VLDL Cholesterol 11    LDL Cholesterol  91    LDL/HDL Ratio 0.98      TSH          3/26/2024    08:49 6/7/2024    10:16 12/9/2024    08:49   TSH   TSH 8.060  4.890  3.500                Assessment and Plan   Diagnoses and all orders for this visit:    1. Essential hypertension (Primary)  Assessment & Plan:  Hypertension is stable and controlled  Continue current treatment regimen.  Dietary sodium restriction.  Weight loss.  Blood pressure will be reassessed in 6 months.      2. Mixed  hyperlipidemia  Assessment & Plan:  Lipid abnormalities are improving with lifestyle modifications.  Nutritional counseling was provided.  Lipids will be reassessed in 6 months.    Orders:  -     Lipid Panel; Future    3. Acquired hypothyroidism  Assessment & Plan:  Last TSH was normal, no changes were made, advised to recheck in 3 months       Orders:  -     TSH+Free T4; Future    4. Encounter for screening mammogram for malignant neoplasm of breast  -     Mammo Screening Digital Tomosynthesis Bilateral With CAD; Future    5. Annual physical exam  -     Comprehensive Metabolic Panel; Future  -     CBC & Differential; Future  -     Urinalysis With Microscopic - Urine, Clean Catch; Future             Follow Up   Return in about 6 months (around 6/13/2025).  Patient was given instructions and counseling regarding her condition or for health maintenance advice. Please see specific information pulled into the AVS if appropriate.

## 2024-12-18 DIAGNOSIS — I10 ESSENTIAL HYPERTENSION: ICD-10-CM

## 2024-12-18 RX ORDER — AMLODIPINE BESYLATE 10 MG/1
10 TABLET ORAL DAILY
Qty: 90 TABLET | Refills: 1 | Status: SHIPPED | OUTPATIENT
Start: 2024-12-18

## 2024-12-18 NOTE — TELEPHONE ENCOUNTER
Caller: Marisa Uribe    Relationship: Self    Best call back number: [phone]     Requested Prescriptions:   Requested Prescriptions     Pending Prescriptions Disp Refills    amLODIPine (NORVASC) 10 MG tablet 90 tablet 1     Sig: Take 1 tablet by mouth Daily.        Pharmacy where request should be sent: [facility] #[id number] - [place], [place] - [address] [facility] - [phone]  - [phone] FX     Last office visit with prescribing clinician: 12/13/2024   Last telemedicine visit with prescribing clinician: Visit date not found   Next office visit with prescribing clinician: 6/13/2025     Does the patient have less than a 3 day supply:  [x] Yes  [] No    Would you like a call back once the refill request has been completed: [x] Yes [] No    If the office needs to give you a call back, can they leave a voicemail: [x] Yes [] No    Deidra Velez Rep   12/18/24 08:12 EST

## 2025-01-18 ENCOUNTER — HOSPITAL ENCOUNTER (OUTPATIENT)
Dept: MAMMOGRAPHY | Facility: HOSPITAL | Age: 75
Discharge: HOME | End: 2025-01-18
Admitting: FAMILY MEDICINE
Payer: MEDICARE

## 2025-01-18 DIAGNOSIS — Z12.31 ENCOUNTER FOR SCREENING MAMMOGRAM FOR MALIGNANT NEOPLASM OF BREAST: ICD-10-CM

## 2025-01-18 PROCEDURE — 77067 SCR MAMMO BI INCL CAD: CPT

## 2025-01-18 PROCEDURE — 77063 BREAST TOMOSYNTHESIS BI: CPT

## 2025-01-27 ENCOUNTER — OFFICE VISIT (OUTPATIENT)
Dept: ORTHOPEDIC SURGERY | Facility: CLINIC | Age: 75
End: 2025-01-27
Payer: MEDICARE

## 2025-01-27 VITALS
WEIGHT: 160 LBS | BODY MASS INDEX: 28.35 KG/M2 | HEIGHT: 63 IN | OXYGEN SATURATION: 96 % | SYSTOLIC BLOOD PRESSURE: 156 MMHG | HEART RATE: 70 BPM | DIASTOLIC BLOOD PRESSURE: 81 MMHG

## 2025-01-27 DIAGNOSIS — Z47.89 AFTERCARE FOLLOWING SURGERY OF THE MUSCULOSKELETAL SYSTEM: Primary | ICD-10-CM

## 2025-01-27 DIAGNOSIS — M25.552 LEFT HIP PAIN: ICD-10-CM

## 2025-01-27 PROCEDURE — 3077F SYST BP >= 140 MM HG: CPT | Performed by: PHYSICIAN ASSISTANT

## 2025-01-27 PROCEDURE — 1159F MED LIST DOCD IN RCRD: CPT | Performed by: PHYSICIAN ASSISTANT

## 2025-01-27 PROCEDURE — 99213 OFFICE O/P EST LOW 20 MIN: CPT | Performed by: PHYSICIAN ASSISTANT

## 2025-01-27 PROCEDURE — 3079F DIAST BP 80-89 MM HG: CPT | Performed by: PHYSICIAN ASSISTANT

## 2025-01-27 PROCEDURE — 1160F RVW MEDS BY RX/DR IN RCRD: CPT | Performed by: PHYSICIAN ASSISTANT

## 2025-01-27 NOTE — PROGRESS NOTES
"Chief Complaint  Follow-up of the Left Hip    Subjective          History of Present Illness      Marisa Uribe is a 74 y.o. female  presents to [facility] for     Patient presents for follow-up evaluation of left anterior total hip arthroplasty, 2024.  Patient states her hip is \"great \".  She states she has \"no problems \".  She is happy with her progress and has no difficulty with range of motion or strength, she states she can even wear high heels now without trouble, she still has some numbness around the incision site otherwise she is happy with her progress, no new complaints.      No Known Allergies     Social History     Socioeconomic History    Marital status:    Tobacco Use    Smoking status: Former     Current packs/day: 0.00     Average packs/day: 2.0 packs/day for 15.0 years (30.0 ttl pk-yrs)     Types: Cigarettes     Start date: 1967     Quit date: 1982     Years since quittin.1     Passive exposure: Never    Smokeless tobacco: Never    Tobacco comments:     no second hand smoke exposure   Vaping Use    Vaping status: Never Used   Substance and Sexual Activity    Alcohol use: Yes     Alcohol/week: 1.0 standard drink of alcohol     Types: 1 Glasses of wine per week     Comment: rare occasion    Drug use: Never    Sexual activity: Defer        REVIEW OF SYSTEMS    Constitutional: Awake alert and oriented x3, no acute distress, denies fevers, chills, weight loss  Respiratory: No respiratory distress  Vascular: Brisk cap refill, Intact distal pulses, No cyanosis, compartments soft with no signs or symptoms of compartment syndrome or DVT.   Cardiovascular: Denies chest pain, shortness of breath  Skin: Denies rashes, acute skin changes  Neurologic: Denies headache, loss of consciousness  MSK: Left hip pain      Objective   Vital Signs:   /81   Pulse 70   Ht 160 cm (62.99\")   Wt 72.6 kg (160 lb)   SpO2 96%   BMI 28.35 kg/m²     Body mass index is " 28.35 kg/m².    Physical Exam       Left hip: Nontender to palpation, no pain with range of motion, no pain with resisted range of motion, active flexion 120 external rotation 40 internal rotation 30 leg lengths are equal, 5 out of 5 strength, neurovascularly intact      Procedures    Imaging Results (Most Recent)       Procedure Component Value Units Date/Time    XR Hip With or Without Pelvis 2 - 3 View Left [[id number]] Resulted: 01/27/25 1039     Updated: 01/27/25 1039    Narrative:      View:AP/Lateral view(s)  Site: Left hip  Indication: Left hip pain  Study: X-rays ordered, taken in the office, and reviewed today  X-ray: Intact appearing left total hip arthroplasty, no signs of hardware   failure or loosening, no subsidence or periprosthetic fracture, good   alignment  Comparative data: Previous studies             Result Review :   The following data was reviewed by: ZENOBIA Lugo on 01/27/2025:  Data reviewed : Radiologic studies reviewed by me with the patient              Assessment and Plan    Diagnoses and all orders for this visit:    1. Aftercare following surgery of left anterior total hip arthroplasty 5/1/2024 (Primary)    2. Left hip pain  -     XR Hip With or Without Pelvis 2 - 3 View Left        Reviewed x-rays with the patient discussed diagnosis and treatment options with her due to her excellent progress she was advised to continue activity and weightbearing as tolerated and follow-up as needed, patient agreed.    Call or return if worsening symptoms.    Follow Up   Return if symptoms worsen or fail to improve.  Patient was given instructions and counseling regarding her condition or for health maintenance advice. Please see specific information pulled into the AVS if appropriate.       EMR Dragon/Transcription disclaimer:  Part of this note may be an electronic transcription/translation of spoken language to printed text using the Dragon Dictation System

## 2025-03-03 ENCOUNTER — OFFICE VISIT (OUTPATIENT)
Dept: FAMILY MEDICINE CLINIC | Facility: CLINIC | Age: 75
End: 2025-03-03
Payer: MEDICARE

## 2025-03-03 ENCOUNTER — HOSPITAL ENCOUNTER (OUTPATIENT)
Dept: GENERAL RADIOLOGY | Facility: HOSPITAL | Age: 75
Discharge: HOME | End: 2025-03-03
Admitting: FAMILY MEDICINE
Payer: MEDICARE

## 2025-03-03 VITALS
HEART RATE: 63 BPM | OXYGEN SATURATION: 97 % | WEIGHT: 170.4 LBS | SYSTOLIC BLOOD PRESSURE: 149 MMHG | DIASTOLIC BLOOD PRESSURE: 77 MMHG | HEIGHT: 63 IN | BODY MASS INDEX: 30.19 KG/M2 | RESPIRATION RATE: 16 BRPM | TEMPERATURE: 98.1 F

## 2025-03-03 DIAGNOSIS — M25.572 ACUTE LEFT ANKLE PAIN: Primary | ICD-10-CM

## 2025-03-03 DIAGNOSIS — M25.572 ACUTE LEFT ANKLE PAIN: ICD-10-CM

## 2025-03-03 DIAGNOSIS — I10 ESSENTIAL HYPERTENSION: Chronic | ICD-10-CM

## 2025-03-03 PROCEDURE — G2211 COMPLEX E/M VISIT ADD ON: HCPCS | Performed by: FAMILY MEDICINE

## 2025-03-03 PROCEDURE — 1126F AMNT PAIN NOTED NONE PRSNT: CPT | Performed by: FAMILY MEDICINE

## 2025-03-03 PROCEDURE — 3078F DIAST BP <80 MM HG: CPT | Performed by: FAMILY MEDICINE

## 2025-03-03 PROCEDURE — 1159F MED LIST DOCD IN RCRD: CPT | Performed by: FAMILY MEDICINE

## 2025-03-03 PROCEDURE — 99213 OFFICE O/P EST LOW 20 MIN: CPT | Performed by: FAMILY MEDICINE

## 2025-03-03 PROCEDURE — 73610 X-RAY EXAM OF ANKLE: CPT

## 2025-03-03 PROCEDURE — 3077F SYST BP >= 140 MM HG: CPT | Performed by: FAMILY MEDICINE

## 2025-03-03 PROCEDURE — 1160F RVW MEDS BY RX/DR IN RCRD: CPT | Performed by: FAMILY MEDICINE

## 2025-03-03 NOTE — PROGRESS NOTES
"Chief Complaint  Ankle Pain (Left ankle-fell on Saturday and heard a pop )    Subjective        Marisa Uribe presents to [facility]  History of Present Illness  She is here today for management of her acute and chronic medical conditions.  She has hypertension, GERD, hypothyroidism.  She is a previous smoker and smoked for 16 years.  Smoked approximately 2 packs a day.  She occasionally uses alcohol.  She has family history of coronary artery disease and lung cancer.     She needs a colonoscopy in 2025.      She had a left hip replacement in May.     She is here today for a left ankle injury. She has been having swelling ever since. She is able to bear weight. She does not think it is fractured.      The patient has no other complaints today and denies chest pain, shortness of breath, weakness, numbness, nausea, vomiting, diarrhea, dizziness or syncopal event.        Objective   Vital Signs:  /77   Pulse 63   Temp 98.1 °F (36.7 °C)   Resp 16   Ht 160 cm (62.99\")   Wt 77.3 kg (170 lb 6.4 oz)   SpO2 97%   BMI 30.19 kg/m²   Estimated body mass index is 30.19 kg/m² as calculated from the following:    Height as of this encounter: 160 cm (62.99\").    Weight as of this encounter: 77.3 kg (170 lb 6.4 oz).            Physical Exam  Vitals reviewed.   Constitutional:       Appearance: She is well-developed. She is obese.   HENT:      Head: Normocephalic and atraumatic.      Right Ear: External ear normal.      Left Ear: External ear normal.      Mouth/Throat:      Pharynx: No oropharyngeal exudate.   Eyes:      Conjunctiva/sclera: Conjunctivae normal.      Pupils: Pupils are equal, round, and reactive to light.   Neck:      Vascular: No carotid bruit.   Cardiovascular:      Rate and Rhythm: Normal rate and regular rhythm.      Heart sounds: No murmur heard.     No friction rub. No gallop.   Pulmonary:      Effort: Pulmonary effort is normal.      Breath sounds: Normal breath " sounds. No wheezing or rhonchi.   Abdominal:      General: There is no distension.   Musculoskeletal:        Feet:    Feet:      Comments: Left ankle swelling and mild ecchymosis.   Skin:     General: Skin is warm and dry.   Neurological:      Mental Status: She is alert and oriented to person, place, and time.      Cranial Nerves: No cranial nerve deficit.      Motor: No weakness.   Psychiatric:         Mood and Affect: Mood and affect normal.         Behavior: Behavior normal.         Thought Content: Thought content normal.         Judgment: Judgment normal.        Result Review :    CMP          4/23/2024    10:57 5/2/2024    03:18 6/7/2024    10:16   CMP   Glucose 90  106  92    BUN 19  18  18    Creatinine 0.85  0.79  0.87    EGFR 72.9  79.6  70.9    Sodium 137  139  142    Potassium 3.9  3.6  4.1    Chloride 99  107  104    Calcium 9.5  8.3  9.7    Total Protein 6.6   7.0    Albumin 4.3   4.4    Globulin 2.3   2.6    Total Bilirubin 0.5   0.3    Alkaline Phosphatase 64   84    AST (SGOT) 18   16    ALT (SGPT) 20   15    Albumin/Globulin Ratio 1.9   1.7    BUN/Creatinine Ratio 22.4  22.8  20.7    Anion Gap 10.8  11.4  11.3      CBC          5/1/2024    10:15 5/2/2024    03:18 6/7/2024    10:16   CBC   WBC  8.69  7.18    RBC  3.12  4.14    Hemoglobin 11.4  9.0  11.1    Hematocrit 34.6  26.8  34.0    MCV  85.9  82.1    MCH  28.8  26.8    MCHC  33.6  32.6    RDW  13.0  12.7    Platelets  163  267      Lipid Panel          6/7/2024    10:16   Lipid Panel   Total Cholesterol 194    Triglycerides 58    HDL Cholesterol 92    VLDL Cholesterol 11    LDL Cholesterol  91    LDL/HDL Ratio 0.98      TSH          3/26/2024    08:49 6/7/2024    10:16 12/9/2024    08:49   TSH   TSH 8.060  4.890  3.500                Assessment and Plan   Diagnoses and all orders for this visit:    1. Acute left ankle pain (Primary)  Comments:  The patient was given order today for x-ray of her left ankle.  It be managed according to findings.   Also told to take ibuprofen for swelling and pain.  Orders:  -     XR Ankle 3+ View Left; Future    2. Essential hypertension  Assessment & Plan:  Hypertension is stable and controlled  Continue current treatment regimen.  Dietary sodium restriction.  Weight loss.  Blood pressure will be reassessed in 6 months.               Follow Up   Return if symptoms worsen or fail to improve.  Patient was given instructions and counseling regarding her condition or for health maintenance advice. Please see specific information pulled into the AVS if appropriate.

## 2025-04-22 RX ORDER — LOSARTAN POTASSIUM AND HYDROCHLOROTHIAZIDE 25; 100 MG/1; MG/1
1 TABLET ORAL DAILY
Qty: 90 TABLET | Refills: 1 | Status: SHIPPED | OUTPATIENT
Start: 2025-04-22

## 2025-06-02 RX ORDER — LEVOTHYROXINE SODIUM 112 UG/1
112 TABLET ORAL DAILY
Qty: 90 TABLET | Refills: 1 | Status: SHIPPED | OUTPATIENT
Start: 2025-06-02

## 2025-06-09 ENCOUNTER — LAB (OUTPATIENT)
Dept: LAB | Facility: HOSPITAL | Age: 75
End: 2025-06-09
Payer: MEDICARE

## 2025-06-09 DIAGNOSIS — E78.2 MIXED HYPERLIPIDEMIA: Chronic | ICD-10-CM

## 2025-06-09 DIAGNOSIS — E03.9 ACQUIRED HYPOTHYROIDISM: Chronic | ICD-10-CM

## 2025-06-09 DIAGNOSIS — Z00.00 ANNUAL PHYSICAL EXAM: ICD-10-CM

## 2025-06-09 LAB
ALBUMIN SERPL-MCNC: 4.1 G/DL (ref 3.5–5.2)
ALBUMIN/GLOB SERPL: 1.6 G/DL
ALP SERPL-CCNC: 88 U/L (ref 39–117)
ALT SERPL W P-5'-P-CCNC: 13 U/L (ref 1–33)
ANION GAP SERPL CALCULATED.3IONS-SCNC: 9.8 MMOL/L (ref 5–15)
AST SERPL-CCNC: 16 U/L (ref 1–32)
BASOPHILS # BLD AUTO: 0.04 10*3/MM3 (ref 0–0.2)
BASOPHILS NFR BLD AUTO: 0.8 % (ref 0–1.5)
BILIRUB SERPL-MCNC: 0.4 MG/DL (ref 0–1.2)
BUN SERPL-MCNC: 19 MG/DL (ref 8–23)
BUN/CREAT SERPL: 20.4 (ref 7–25)
CALCIUM SPEC-SCNC: 9 MG/DL (ref 8.6–10.5)
CHLORIDE SERPL-SCNC: 104 MMOL/L (ref 98–107)
CHOLEST SERPL-MCNC: 185 MG/DL (ref 0–200)
CO2 SERPL-SCNC: 26.2 MMOL/L (ref 22–29)
CREAT SERPL-MCNC: 0.93 MG/DL (ref 0.57–1)
DEPRECATED RDW RBC AUTO: 46.4 FL (ref 37–54)
EGFRCR SERPLBLD CKD-EPI 2021: 65 ML/MIN/1.73
EOSINOPHIL # BLD AUTO: 0.14 10*3/MM3 (ref 0–0.4)
EOSINOPHIL NFR BLD AUTO: 2.7 % (ref 0.3–6.2)
ERYTHROCYTE [DISTWIDTH] IN BLOOD BY AUTOMATED COUNT: 14.3 % (ref 12.3–15.4)
GLOBULIN UR ELPH-MCNC: 2.6 GM/DL
GLUCOSE SERPL-MCNC: 84 MG/DL (ref 65–99)
HCT VFR BLD AUTO: 41.8 % (ref 34–46.6)
HDLC SERPL-MCNC: 63 MG/DL (ref 40–60)
HGB BLD-MCNC: 13.4 G/DL (ref 12–15.9)
IMM GRANULOCYTES # BLD AUTO: 0.03 10*3/MM3 (ref 0–0.05)
IMM GRANULOCYTES NFR BLD AUTO: 0.6 % (ref 0–0.5)
LDLC SERPL CALC-MCNC: 110 MG/DL (ref 0–100)
LDLC/HDLC SERPL: 1.74 {RATIO}
LYMPHOCYTES # BLD AUTO: 1.22 10*3/MM3 (ref 0.7–3.1)
LYMPHOCYTES NFR BLD AUTO: 23.9 % (ref 19.6–45.3)
MCH RBC QN AUTO: 28.1 PG (ref 26.6–33)
MCHC RBC AUTO-ENTMCNC: 32.1 G/DL (ref 31.5–35.7)
MCV RBC AUTO: 87.6 FL (ref 79–97)
MONOCYTES # BLD AUTO: 0.4 10*3/MM3 (ref 0.1–0.9)
MONOCYTES NFR BLD AUTO: 7.8 % (ref 5–12)
NEUTROPHILS NFR BLD AUTO: 3.28 10*3/MM3 (ref 1.7–7)
NEUTROPHILS NFR BLD AUTO: 64.2 % (ref 42.7–76)
NRBC BLD AUTO-RTO: 0 /100 WBC (ref 0–0.2)
PLATELET # BLD AUTO: 220 10*3/MM3 (ref 140–450)
PMV BLD AUTO: 9.3 FL (ref 6–12)
POTASSIUM SERPL-SCNC: 3.9 MMOL/L (ref 3.5–5.2)
PROT SERPL-MCNC: 6.7 G/DL (ref 6–8.5)
RBC # BLD AUTO: 4.77 10*6/MM3 (ref 3.77–5.28)
SODIUM SERPL-SCNC: 140 MMOL/L (ref 136–145)
T4 FREE SERPL-MCNC: 1.55 NG/DL (ref 0.92–1.68)
TRIGL SERPL-MCNC: 63 MG/DL (ref 0–150)
TSH SERPL DL<=0.05 MIU/L-ACNC: 5.06 UIU/ML (ref 0.27–4.2)
VLDLC SERPL-MCNC: 12 MG/DL (ref 5–40)
WBC NRBC COR # BLD AUTO: 5.11 10*3/MM3 (ref 3.4–10.8)

## 2025-06-09 PROCEDURE — 80053 COMPREHEN METABOLIC PANEL: CPT

## 2025-06-09 PROCEDURE — 85025 COMPLETE CBC W/AUTO DIFF WBC: CPT

## 2025-06-09 PROCEDURE — 84439 ASSAY OF FREE THYROXINE: CPT

## 2025-06-09 PROCEDURE — 84443 ASSAY THYROID STIM HORMONE: CPT

## 2025-06-09 PROCEDURE — 80061 LIPID PANEL: CPT

## 2025-06-09 PROCEDURE — 36415 COLL VENOUS BLD VENIPUNCTURE: CPT

## 2025-06-12 DIAGNOSIS — I10 ESSENTIAL HYPERTENSION: ICD-10-CM

## 2025-06-12 RX ORDER — AMLODIPINE BESYLATE 10 MG/1
10 TABLET ORAL DAILY
Qty: 90 TABLET | Refills: 1 | Status: SHIPPED | OUTPATIENT
Start: 2025-06-12

## 2025-06-13 ENCOUNTER — OFFICE VISIT (OUTPATIENT)
Dept: FAMILY MEDICINE CLINIC | Facility: CLINIC | Age: 75
End: 2025-06-13
Payer: MEDICARE

## 2025-06-13 VITALS
BODY MASS INDEX: 29.91 KG/M2 | OXYGEN SATURATION: 97 % | HEART RATE: 66 BPM | DIASTOLIC BLOOD PRESSURE: 84 MMHG | WEIGHT: 168.8 LBS | HEIGHT: 63 IN | SYSTOLIC BLOOD PRESSURE: 125 MMHG | RESPIRATION RATE: 17 BRPM | TEMPERATURE: 97.7 F

## 2025-06-13 DIAGNOSIS — E03.9 ACQUIRED HYPOTHYROIDISM: Chronic | ICD-10-CM

## 2025-06-13 DIAGNOSIS — I10 ESSENTIAL HYPERTENSION: Chronic | ICD-10-CM

## 2025-06-13 DIAGNOSIS — Z00.00 MEDICARE ANNUAL WELLNESS VISIT, SUBSEQUENT: Primary | ICD-10-CM

## 2025-06-13 DIAGNOSIS — E66.3 OVERWEIGHT WITH BODY MASS INDEX (BMI) OF 29 TO 29.9 IN ADULT: Chronic | ICD-10-CM

## 2025-06-13 DIAGNOSIS — E78.2 MIXED HYPERLIPIDEMIA: Chronic | ICD-10-CM

## 2025-06-13 LAB
BACTERIA UR QL AUTO: NORMAL /HPF
BILIRUB UR QL STRIP: NEGATIVE
CLARITY UR: CLEAR
COLOR UR: YELLOW
GLUCOSE UR STRIP-MCNC: NEGATIVE MG/DL
HGB UR QL STRIP.AUTO: NEGATIVE
HYALINE CASTS UR QL AUTO: NORMAL /LPF
KETONES UR QL STRIP: ABNORMAL
LEUKOCYTE ESTERASE UR QL STRIP.AUTO: NEGATIVE
NITRITE UR QL STRIP: NEGATIVE
PH UR STRIP.AUTO: 7 [PH] (ref 5–8)
PROT UR QL STRIP: NEGATIVE
RBC # UR STRIP: NORMAL /HPF
REF LAB TEST METHOD: NORMAL
SP GR UR STRIP: 1.02 (ref 1–1.03)
SQUAMOUS #/AREA URNS HPF: NORMAL /HPF
UROBILINOGEN UR QL STRIP: ABNORMAL
WBC # UR STRIP: NORMAL /HPF

## 2025-06-13 PROCEDURE — 81001 URINALYSIS AUTO W/SCOPE: CPT | Performed by: FAMILY MEDICINE

## 2025-06-13 NOTE — PROGRESS NOTES
Subjective   The ABCs of the Annual Wellness Visit  Medicare Wellness Visit      Marisa Uribe is a 74 y.o. patient who presents for a Medicare Wellness Visit.    The following portions of the patient's history were reviewed and   updated as appropriate: allergies, current medications, past family history, past medical history, past social history, past surgical history, and problem list.    Compared to one year ago, the patient's physical   health is the same.  Compared to one year ago, the patient's mental   health is the same.    Recent Hospitalizations:  She was not admitted to the hospital during the last year.     Current Medical Providers:  Patient Care Team:  Amy Goel DO as PCP - General (Family Medicine)  Rui Fisher PA as Physician Assistant (Physician Assistant)  Bg Douglass MD as Consulting Physician (Orthopedic Surgery)    Outpatient Medications Prior to Visit   Medication Sig Dispense Refill    amLODIPine (NORVASC) 10 MG tablet TAKE 1 TABLET BY MOUTH EVERY DAY 90 tablet 1    levothyroxine (SYNTHROID, LEVOTHROID) 112 MCG tablet TAKE 1 TABLET BY MOUTH EVERY DAY 90 tablet 1    losartan-hydrochlorothiazide (HYZAAR) 100-25 MG per tablet TAKE 1 TABLET BY MOUTH EVERY DAY 90 tablet 1    Nutritional Supplements (NUTRITIONAL SUPPLEMENT PO) Take 1 packet by mouth 3 (Three) Times a Day. 1 packet TID  for minor aches and pains       No facility-administered medications prior to visit.     No opioid medication identified on active medication list. I have reviewed chart for other potential  high risk medication/s and harmful drug interactions in the elderly.      Aspirin is not on active medication list.  Aspirin use is not indicated based on review of current medical condition/s. Risk of harm outweighs potential benefits.  .    Patient Active Problem List   Diagnosis    Acquired hypothyroidism    Essential hypertension    Mixed hyperlipidemia    Overweight with body mass index (BMI) of  "29 to 29.9 in adult    Muscle spasm    Osteoarthritis of left hip    Medicare annual wellness visit, subsequent    Gastroesophageal reflux disease without esophagitis    Vitamin D deficiency    Chronic bilateral low back pain without sciatica    Pain in both knees    Arthritis of left hip    Aftercare following surgery of left anterior total hip arthroplasty 2024    Vertigo     Advance Care Planning Advance Directive is not on file.  ACP discussion was held with the patient during this visit. Patient does not have an advance directive, information provided.            Objective   Vitals:    25 0957   BP: 125/84   BP Location: Left arm   Patient Position: Sitting   Cuff Size: Adult   Pulse: 66   Resp: 17   Temp: 97.7 °F (36.5 °C)   TempSrc: Temporal   SpO2: 97%   Weight: 76.6 kg (168 lb 12.8 oz)   Height: 160 cm (62.99\")   PainSc: 0-No pain       Estimated body mass index is 29.91 kg/m² as calculated from the following:    Height as of this encounter: 160 cm (62.99\").    Weight as of this encounter: 76.6 kg (168 lb 12.8 oz).                Does the patient have evidence of cognitive impairment? No  Lab Results   Component Value Date    TRIG 63 2025    HDL 63 (H) 2025     (H) 2025    VLDL 12 2025                                                                                                Health  Risk Assessment    Smoking Status:  Social History     Tobacco Use   Smoking Status Former    Current packs/day: 0.00    Average packs/day: 2.0 packs/day for 15.0 years (30.0 ttl pk-yrs)    Types: Cigarettes    Start date: 1967    Quit date: 1982    Years since quittin.4    Passive exposure: Never   Smokeless Tobacco Never   Tobacco Comments    no second hand smoke exposure     Alcohol Consumption:  Social History     Substance and Sexual Activity   Alcohol Use Yes    Alcohol/week: 1.0 standard drink of alcohol    Types: 1 Glasses of wine per week    Comment: rare " occasion       Fall Risk Screen  STEADI Fall Risk Assessment was completed, and patient is at LOW risk for falls.Assessment completed on:2025    Depression Screening   Little interest or pleasure in doing things? Not at all   Feeling down, depressed, or hopeless? Not at all   PHQ-2 Total Score 0      Health Habits and Functional and Cognitive Screenin/13/2025     9:00 AM   Functional & Cognitive Status   Do you have difficulty preparing food and eating? No   Do you have difficulty bathing yourself, getting dressed or grooming yourself? No   Do you have difficulty using the toilet? No   Do you have difficulty moving around from place to place? No   Do you have trouble with steps or getting out of a bed or a chair? No   Current Diet Well Balanced Diet   Dental Exam Up to date   Eye Exam Up to date   Exercise (times per week) 7 times per week   Current Exercises Include Yard Work;Other        Exercise Comment chair yoga   Do you need help using the phone?  No   Are you deaf or do you have serious difficulty hearing?  No   Do you need help to go to places out of walking distance? No   Do you need help shopping? No   Do you need help preparing meals?  No   Do you need help with housework?  No   Do you need help with laundry? No   Do you need help taking your medications? No   Do you need help managing money? No   Do you ever drive or ride in a car without wearing a seat belt? No   Have you felt unusual stress, anger or loneliness in the last month? No   Who do you live with? Spouse   If you need help, do you have trouble finding someone available to you? No   Have you been bothered in the last four weeks by sexual problems? No   Do you have difficulty concentrating, remembering or making decisions? No           Age-appropriate Screening Schedule:  Refer to the list below for future screening recommendations based on patient's age, sex and/or medical conditions. Orders for these recommended tests are listed  in the plan section. The patient has been provided with a written plan.    Health Maintenance List  Health Maintenance   Topic Date Due    TDAP/TD VACCINES (1 - Tdap) Never done    ZOSTER VACCINE (1 of 2) Never done    COVID-19 Vaccine (3 - 2024-25 season) 06/27/2025 (Originally 9/1/2024)    Pneumococcal Vaccine 50+ (1 of 1 - PCV) 12/10/2025 (Originally 10/27/2001)    INFLUENZA VACCINE  07/01/2025    DXA SCAN  01/15/2026    LIPID PANEL  06/09/2026    ANNUAL WELLNESS VISIT  06/13/2026    MAMMOGRAM  01/18/2027    COLORECTAL CANCER SCREENING  11/30/2030    HEPATITIS C SCREENING  Completed                                                                                                                                                CMS Preventative Services Quick Reference  Risk Factors Identified During Encounter  Fall Risk-High or Moderate: Discussed Fall Prevention in the home    The above risks/problems have been discussed with the patient.  Pertinent information has been shared with the patient in the After Visit Summary.  An After Visit Summary and PPPS were made available to the patient.    Follow Up:   Next Medicare Wellness visit to be scheduled in 1 year.          Additional E&M Note during same encounter follows:  Patient has multiple medical problems which are significant and separately identifiable that require additional work above and beyond the Medicare Wellness Visit.      Chief Complaint  Medicare Wellness-subsequent    Marisa Uribe is a 74 y.o. female who presents to [facility]     History of Present Illness  The patient is here today for a Medicare annual wellness visit and for the management of her chronic medical conditions, including hypothyroidism, arthritis, hypertension, GERD, hyperlipidemia, and vitamin D deficiency.    She reports no current issues with her knees or hips following a hip replacement surgery. However, she experiences occasional pain in her  "thumbs, particularly during driving, which she attributes to arthritis. She has been managing this discomfort with comfrey, a treatment that she finds beneficial.    Earlier this year, she experienced a fall due to her leg falling asleep, resulting in a cracked ankle. An x-ray did not reveal any fractures but identified a small heel spur. She has been applying comfrey to the area at night, hoping it will aid in dissolving the bone spur. She is making conscious efforts to prevent further falls due to a history of multiple fractures.    She maintains a generally healthy diet, with limited consumption of sweets and avoidance of ice cream. She also consumes Celtic salt in the morning.    PAST SURGICAL HISTORY:  Hip replacement surgery  Multiple fractures (details not specified)    FAMILY HISTORY  Her father had a heart attack and was a type I diabetic.    Objective   Vital Signs:   Vitals:    06/13/25 0957   BP: 125/84   BP Location: Left arm   Patient Position: Sitting   Cuff Size: Adult   Pulse: 66   Resp: 17   Temp: 97.7 °F (36.5 °C)   TempSrc: Temporal   SpO2: 97%   Weight: 76.6 kg (168 lb 12.8 oz)   Height: 160 cm (62.99\")   PainSc: 0-No pain       Wt Readings from Last 3 Encounters:   06/13/25 76.6 kg (168 lb 12.8 oz)   03/03/25 77.3 kg (170 lb 6.4 oz)   01/27/25 72.6 kg (160 lb)     BP Readings from Last 3 Encounters:   06/13/25 125/84   03/03/25 149/77   01/27/25 156/81       Physical Exam  Vitals reviewed.   Constitutional:       Appearance: She is well-developed and overweight.   HENT:      Head: Normocephalic and atraumatic.      Right Ear: External ear normal.      Left Ear: External ear normal.      Mouth/Throat:      Pharynx: No oropharyngeal exudate.   Eyes:      Conjunctiva/sclera: Conjunctivae normal.      Pupils: Pupils are equal, round, and reactive to light.   Neck:      Vascular: No carotid bruit.   Cardiovascular:      Rate and Rhythm: Normal rate and regular rhythm.      Heart sounds: No murmur " heard.     No friction rub. No gallop.   Pulmonary:      Effort: Pulmonary effort is normal.      Breath sounds: Normal breath sounds. No wheezing or rhonchi.   Abdominal:      General: There is no distension.   Skin:     General: Skin is warm and dry.   Neurological:      Mental Status: She is alert and oriented to person, place, and time.      Cranial Nerves: No cranial nerve deficit.      Motor: No weakness.   Psychiatric:         Mood and Affect: Mood and affect normal.         Behavior: Behavior normal.         Thought Content: Thought content normal.         Judgment: Judgment normal.         Physical Exam  Cardiovascular: Blood pressure is normal.  Extremities: Swelling noted in the ankle.  Musculoskeletal: Osteoarthritis noted in the thumb joint.  Skin: Scar noted on the arm.    The following data was reviewed by Amy Goel DO on 06/13/2025  CMP   CMP          6/9/2025    08:49   CMP   Glucose 84    BUN 19.0    Creatinine 0.93    EGFR 65.0    Sodium 140    Potassium 3.9    Chloride 104    Calcium 9.0    Total Protein 6.7    Albumin 4.1    Globulin 2.6    Total Bilirubin 0.4    Alkaline Phosphatase 88    AST (SGOT) 16    ALT (SGPT) 13    Albumin/Globulin Ratio 1.6    BUN/Creatinine Ratio 20.4    Anion Gap 9.8      CBC   CBC          6/9/2025    08:49   CBC   WBC 5.11    RBC 4.77    Hemoglobin 13.4    Hematocrit 41.8    MCV 87.6    MCH 28.1    MCHC 32.1    RDW 14.3    Platelets 220      LIPID   Lipid Panel          6/9/2025    08:49   Lipid Panel   Total Cholesterol 185    Triglycerides 63    HDL Cholesterol 63    VLDL Cholesterol 12    LDL Cholesterol  110    LDL/HDL Ratio 1.74      TSH   TSH          12/9/2024    08:49 6/9/2025    08:49   TSH   TSH 3.500  5.060        Results  Labs   - Thyroid levels: TSH slightly elevated, free T4 is 1.5   - Kidney function: Stable, GFR is 65   - Liver level: Good   - Blood sugar: Good   - Cholesterol: Bad cholesterol slightly elevated    Imaging   - X-ray of ankle:  Earlier this year, Showed a small heel spur        Assessment & Plan   Diagnoses and all orders for this visit:    1. Medicare annual wellness visit, subsequent (Primary)  - Weight within healthy range; lost a few pounds since 03/2025.  - Kidney function stable with GFR of 65, expected for age.  - Liver function and blood glucose levels within normal limits.  - Continue regular cancer screenings.  - Urine test to be conducted to ensure no proteinuria.    2. Essential hypertension  - Blood pressure well-controlled today.  - Maintain current medication regimen.  - Continue healthy lifestyle.    3. Mixed hyperlipidemia  - LDL cholesterol slightly elevated.  - Continue current diet low in sweets and processed foods.  - No changes to medication regimen necessary at this time.    4. Acquired hypothyroidism  - Thyroid levels within normal range; slight increase in TSH, free T4 level of 1.5.  - Kidney function stable.  - Repeat lab work in 6 months to monitor thyroid levels.  - Continue current thyroid medication regimen.-     TSH+Free T4; Future    5. Overweight with body mass index (BMI) of 29 to 29.9 in adult  Assessment & Plan:  Patient's (Body mass index is 29.91 kg/m².) indicates that they are overweight with health conditions that include hypertension and dyslipidemias . Weight is unchanged. BMI is above average; BMI management plan is completed. We discussed low calorie, low carb based diet program, portion control, and increasing exercise.     7. Osteoarthritis.  - Improvement in hip arthritis following hip replacement surgery.  - Pain in thumbs, likely due to arthritis, managed with comfrey.  - Bone spur in ankle treated with comfrey at night.  - Continue using comfrey for symptom relief.    8. Vitamin D Deficiency.  - Continue current vitamin D supplementation.      FOLLOW UP  Return in about 6 months (around 12/13/2025).  Patient was given instructions and counseling regarding her condition or for health  maintenance advice. Please see specific information pulled into the AVS if appropriate.     Patient or patient representative verbalized consent for the use of Ambient Listening during the visit with  Amy Goel DO for chart documentation. 6/14/2025  10:33 EDT    Amy Goel DO  06/14/25  17:46 EDT

## 2025-06-14 NOTE — ASSESSMENT & PLAN NOTE
Patient's (Body mass index is 29.91 kg/m².) indicates that they are overweight with health conditions that include hypertension and dyslipidemias . Weight is unchanged. BMI is above average; BMI management plan is completed. We discussed low calorie, low carb based diet program, portion control, and increasing exercise.

## (undated) DEVICE — Device: Brand: PULSAVAC®

## (undated) DEVICE — SOL IRR NACL 0.9PCT 3000ML

## (undated) DEVICE — SUT VIC 2/0 CT1 36IN

## (undated) DEVICE — DRP SURG U/DRP INVISISHIELD PA 48X52IN

## (undated) DEVICE — KT PT POSITION SUPINE HANA/PROFX TABL

## (undated) DEVICE — APPL CHLORAPREP HI/LITE 26ML ORNG

## (undated) DEVICE — BIPOLAR SEALER 23-112-1 AQM 6.0: Brand: AQUAMANTYS™

## (undated) DEVICE — TOWEL,OR,DSP,ST,BLUE,STD,4/PK,20PK/CS: Brand: MEDLINE

## (undated) DEVICE — ELECTRD BLD EXT EDGE 1P COAT 6.5IN STRL

## (undated) DEVICE — ANTIBACTERIAL VIOLET BRAIDED (POLYGLACTIN 910), SYNTHETIC ABSORBABLE SURGICAL SUTURE: Brand: COATED VICRYL

## (undated) DEVICE — PENCL E/S HNDSWCH ROCKR CB

## (undated) DEVICE — GAUZE,SPONGE,4"X4",16PLY,STRL,LF,10/TRAY: Brand: MEDLINE

## (undated) DEVICE — MAT FLR ABS W/BLU/LINER 56X72IN WHT

## (undated) DEVICE — TOTAL ANTERIOR HIP-LF: Brand: MEDLINE INDUSTRIES, INC.

## (undated) DEVICE — STERILE POLYISOPRENE POWDER-FREE SURGICAL GLOVES: Brand: PROTEXIS

## (undated) DEVICE — Device

## (undated) DEVICE — STERILE POLYISOPRENE POWDER-FREE SURGICAL GLOVES WITH EMOLLIENT COATING: Brand: PROTEXIS

## (undated) DEVICE — CVR LEG BOOTLEG F/R NOSKID 33IN

## (undated) DEVICE — GLV SURG SENSICARE PI ORTHO SZ8 LF STRL

## (undated) DEVICE — SOL IRR NACL 0.9PCT BT 1000ML

## (undated) DEVICE — PULLOVER TOGA, 2X LARGE: Brand: FLYTE, SURGICOOL

## (undated) DEVICE — SLV SCD KN/LEN ADJ EXPRSS BLENDED MD 1P/U

## (undated) DEVICE — STRYKER PERFORMANCE SERIES SAGITTAL BLADE: Brand: STRYKER PERFORMANCE SERIES